# Patient Record
Sex: MALE | Race: WHITE | NOT HISPANIC OR LATINO | Employment: OTHER | URBAN - METROPOLITAN AREA
[De-identification: names, ages, dates, MRNs, and addresses within clinical notes are randomized per-mention and may not be internally consistent; named-entity substitution may affect disease eponyms.]

---

## 2017-01-03 ENCOUNTER — CONSULT (OUTPATIENT)
Dept: CARDIOLOGY | Facility: CLINIC | Age: 57
End: 2017-01-03

## 2017-01-03 VITALS
HEART RATE: 105 BPM | DIASTOLIC BLOOD PRESSURE: 86 MMHG | HEIGHT: 69 IN | WEIGHT: 247 LBS | BODY MASS INDEX: 36.58 KG/M2 | SYSTOLIC BLOOD PRESSURE: 122 MMHG

## 2017-01-03 DIAGNOSIS — E11.65 TYPE 2 DIABETES MELLITUS WITH HYPERGLYCEMIA, WITHOUT LONG-TERM CURRENT USE OF INSULIN (HCC): ICD-10-CM

## 2017-01-03 DIAGNOSIS — I10 ESSENTIAL HYPERTENSION: ICD-10-CM

## 2017-01-03 DIAGNOSIS — R06.02 SOB (SHORTNESS OF BREATH): ICD-10-CM

## 2017-01-03 DIAGNOSIS — I50.21 ACUTE SYSTOLIC CONGESTIVE HEART FAILURE (HCC): ICD-10-CM

## 2017-01-03 DIAGNOSIS — E78.2 MIXED HYPERLIPIDEMIA: ICD-10-CM

## 2017-01-03 DIAGNOSIS — R07.9 CHEST PAIN, UNSPECIFIED TYPE: Primary | ICD-10-CM

## 2017-01-03 PROCEDURE — 99244 OFF/OP CNSLTJ NEW/EST MOD 40: CPT | Performed by: INTERNAL MEDICINE

## 2017-01-03 RX ORDER — PANTOPRAZOLE SODIUM 40 MG/1
40 TABLET, DELAYED RELEASE ORAL DAILY
COMMUNITY

## 2017-01-03 RX ORDER — TORSEMIDE 20 MG/1
10 TABLET ORAL DAILY
Qty: 90 TABLET | Refills: 3 | Status: SHIPPED | OUTPATIENT
Start: 2017-01-03 | End: 2017-01-23 | Stop reason: SDUPTHER

## 2017-01-03 RX ORDER — TAMSULOSIN HYDROCHLORIDE 0.4 MG/1
1 CAPSULE ORAL NIGHTLY
COMMUNITY

## 2017-01-03 RX ORDER — POTASSIUM CHLORIDE 750 MG/1
10 TABLET, FILM COATED, EXTENDED RELEASE ORAL DAILY
Qty: 30 TABLET | Refills: 11 | Status: SHIPPED | OUTPATIENT
Start: 2017-01-03 | End: 2017-01-23 | Stop reason: SDUPTHER

## 2017-01-03 RX ORDER — ROSUVASTATIN CALCIUM 10 MG/1
10 TABLET, COATED ORAL DAILY
COMMUNITY
End: 2022-02-10 | Stop reason: SDUPTHER

## 2017-01-03 RX ORDER — CARVEDILOL 3.12 MG/1
3.12 TABLET ORAL 2 TIMES DAILY
Qty: 60 TABLET | Refills: 11 | Status: SHIPPED | OUTPATIENT
Start: 2017-01-03 | End: 2017-01-23 | Stop reason: SDUPTHER

## 2017-01-03 RX ORDER — LISINOPRIL 5 MG/1
5 TABLET ORAL DAILY
Qty: 30 TABLET | Refills: 11 | Status: SHIPPED | OUTPATIENT
Start: 2017-01-03 | End: 2017-01-23 | Stop reason: SDUPTHER

## 2017-01-03 NOTE — LETTER
January 3, 2017     Dallas Crespo MD  [address] [facility] [address]  [facility] [address]  [place] [address]    Patient: Abdirahman Mclean   YOB: 1960   Date of Visit: 1/3/2017       Dear Dr. Cherie MD:    Thank you for referring Abdirahman Mclean to me for evaluation. Below are the relevant portions of my assessment and plan of care.    If you have questions, please do not hesitate to call me. I look forward to following Abdirahman along with you.         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    CC:  Chief Complaint   Patient presents with   • abnormal echo     Consult   • Chest Pain     down his Rt arm   • Shortness of Breath   • Irregular Heart Beat       PROBLEM LIST:    CHF acute EF per [facility] reportedly <30%    12/16 bnp 480    2008 [facility] wnl EF 60  DM -A1c 8.3 5 yrs  HTN  HL-on statin    2016 total chol 104  TAMAR- cpap intolerant  Palpitations    12/16 holter <1%pvc, <1% pac   Chronically elevated cpk 4-500    Allergies  Allergies   Allergen Reactions   • Other GI Intolerance     All pain medications        Current Medications    Current Outpatient Prescriptions:   •  ALLOPURINOL PO, Take 100 mg by mouth Daily., Disp: , Rfl:   •  pantoprazole (PROTONIX) 40 MG EC tablet, Take 40 mg by mouth Daily., Disp: , Rfl:   •  RaNITidine HCl (ZANTAC PO), Take 300 mg by mouth Daily., Disp: , Rfl:   •  Rosuvastatin Calcium (CRESTOR PO), Take 10 mg by mouth Daily., Disp: , Rfl:   •  SITagliptin-MetFORMIN HCl (JANUMET PO), Take 1,000 mg by mouth 2 (Two) Times a Day., Disp: , Rfl:   •  tamsulosin (FLOMAX) 0.4 MG capsule 24 hr capsule, Take 1 capsule by mouth Every Night., Disp: , Rfl:     "    History of Present Illness   HPI    Pt admitted to [facility] 2 weeks prior with MIRAMONTES.  [place] to have viral pneumonitis.  Was treated and released on abx.  Unfortunately he worsened as outpt and represented to Dr Crespo's office today.  He was felt to have volume overload and was concerned there could be an ischemic culprit for Mr Mclean' LV dysfxn.  Dr Crespo asked that the pt be seen today.  Mr Mlcean notes being unable to lie down without profound dyspnea.  Limited activity results in \"panting\" .   He does note right sided sharp chest pains with breathing primarily.    ROS  Review of Systems   Constitution: Negative for chills, fever, weakness, malaise/fatigue, night sweats, weight gain and weight loss.   HENT: Negative for headaches, hearing loss and nosebleeds.    Eyes: Negative for blurred vision, vision loss in left eye, vision loss in right eye, visual disturbance and visual halos.   Cardiovascular: Positive for chest pain, dyspnea on exertion and palpitations. Negative for claudication, cyanosis, irregular heartbeat, leg swelling, near-syncope, orthopnea, paroxysmal nocturnal dyspnea and syncope.   Respiratory: Negative for cough, hemoptysis, shortness of breath, snoring and wheezing.    Endocrine: Negative for cold intolerance, heat intolerance, polydipsia, polyphagia and polyuria.   Hematologic/Lymphatic: Negative for adenopathy and bleeding problem. Does not bruise/bleed easily.   Skin: Negative for dry skin, poor wound healing and rash.   Musculoskeletal: Negative for falls, joint pain, joint swelling, muscle cramps, muscle weakness, myalgias and neck pain.   Gastrointestinal: Negative for bloating, abdominal pain, change in bowel habit, bowel incontinence, constipation, diarrhea, dysphagia, excessive appetite, heartburn, hematemesis, hematochezia, jaundice, melena, nausea and vomiting.   Genitourinary: Negative for bladder incontinence, dysuria, flank pain, hematuria, hesitancy and nocturia. " "  Neurological: Negative for aphonia, excessive daytime sleepiness, dizziness, focal weakness, light-headedness, loss of balance, seizures, sensory change, tremors and vertigo.   Psychiatric/Behavioral: Negative for altered mental status, depression, memory loss, substance abuse and suicidal ideas. The patient is not nervous/anxious.        SOCIAL HX  Social History     Social History   • Marital status:      Spouse name: N/A   • Number of children: N/A   • Years of education: N/A     Occupational History   • Not on file.     Social History Main Topics   • Smoking status: Never Smoker   • Smokeless tobacco: Never Used   • Alcohol use No   • Drug use: No   • Sexual activity: Defer     Other Topics Concern   • Not on file     Social History Narrative   • No narrative on file       FAMILY HX  Family History   Problem Relation Age of Onset   • Hypertension Mother    • No Known Problems Father    • Hypertension Brother    • Hypertension Brother        Vitals:    01/03/17 1626   BP: 122/86   BP Location: Right arm   Patient Position: Sitting   Pulse: 105   Weight: 247 lb (112 kg)   Height: 69\" (175.3 cm)       PHYSICAL EXAMINATION:  Physical Exam   Constitutional: He appears well-developed and well-nourished.   Neck: Normal range of motion. Neck supple. No hepatojugular reflux and no JVD present. Carotid bruit is not present. No tracheal deviation present. No thyromegaly present.   Cardiovascular: Normal rate, regular rhythm, S1 normal, S2 normal, intact distal pulses and normal pulses.  PMI is not displaced.  Exam reveals no gallop, no distant heart sounds, no friction rub, no midsystolic click and no opening snap.    No murmur heard.  Pulses:       Radial pulses are 2+ on the right side, and 2+ on the left side.        Dorsalis pedis pulses are 2+ on the right side, and 2+ on the left side.        Posterior tibial pulses are 2+ on the right side, and 2+ on the left side.   Pulmonary/Chest: Effort normal and " breath sounds normal. He has no wheezes. He has no rales.   Abdominal: Soft. Bowel sounds are normal. He exhibits no mass. There is no tenderness. There is no guarding.       Diagnostic Data:  Procedures  No results found for: CHLPL, TRIG, HDL, LDLDIRECT  No results found for: GLUCOSE, BUN, CREATININE, NA, K, CL, CO2, CREATININE, BUN  No results found for: HGBA1C  No results found for: WBC, HGB, HCT, PLT  Labs from [place] reviewed.    ASSESSMENT:   Diagnosis Plan   1. Chest pain, unspecified type  Stress Test With Myocardial Perfusion One Day   2. SOB (shortness of breath)  Stress Test With Myocardial Perfusion One Day   3. Acute systolic congestive heart failure     4. Essential hypertension     5. Mixed hyperlipidemia     6. Type 2 diabetes mellitus with hyperglycemia, without long-term current use of insulin           PLAN:  CHF acute systolic  Will start torsemide/potassium/coreg.  Restart lisinopril.  Screen w lexiscan to evaluate for ischemic burden.  FU BNP after 1 week diuretic.    Pt to refrain from work until improved.    Danny Licona MD, FACC

## 2017-01-03 NOTE — PROGRESS NOTES
"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    CC:  Chief Complaint   Patient presents with   • abnormal echo     Consult   • Chest Pain     down his Rt arm   • Shortness of Breath   • Irregular Heart Beat       PROBLEM LIST:    CHF acute EF per [facility] reportedly <30%    12/16 bnp 480    2008 [facility] wnl EF 60  DM -A1c 8.3 5 yrs  HTN  HL-on statin    2016 total chol 104  TAMAR- cpap intolerant  Palpitations    12/16 holter <1%pvc, <1% pac   Chronically elevated cpk 4-500    Allergies  Allergies   Allergen Reactions   • Other GI Intolerance     All pain medications        Current Medications    Current Outpatient Prescriptions:   •  ALLOPURINOL PO, Take 100 mg by mouth Daily., Disp: , Rfl:   •  pantoprazole (PROTONIX) 40 MG EC tablet, Take 40 mg by mouth Daily., Disp: , Rfl:   •  RaNITidine HCl (ZANTAC PO), Take 300 mg by mouth Daily., Disp: , Rfl:   •  Rosuvastatin Calcium (CRESTOR PO), Take 10 mg by mouth Daily., Disp: , Rfl:   •  SITagliptin-MetFORMIN HCl (JANUMET PO), Take 1,000 mg by mouth 2 (Two) Times a Day., Disp: , Rfl:   •  tamsulosin (FLOMAX) 0.4 MG capsule 24 hr capsule, Take 1 capsule by mouth Every Night., Disp: , Rfl:      History of Present Illness   HPI    Pt admitted to [facility] 2 weeks prior with MIRAMONTES.  [place] to have viral pneumonitis.  Was treated and released on abx.  Unfortunately he worsened as outpt and represented to Dr Crespo's office today.  He was felt to have volume overload and was concerned there could be an ischemic culprit for Mr Mclean' LV dysfxn.  Dr Crespo asked that the pt be seen today.  Mr Mclean notes being unable to lie down without profound dyspnea.  Limited activity results in \"panting\" .   He does note right sided sharp chest pains with " breathing primarily.    ROS  Review of Systems   Constitution: Negative for chills, fever, weakness, malaise/fatigue, night sweats, weight gain and weight loss.   HENT: Negative for headaches, hearing loss and nosebleeds.    Eyes: Negative for blurred vision, vision loss in left eye, vision loss in right eye, visual disturbance and visual halos.   Cardiovascular: Positive for chest pain, dyspnea on exertion and palpitations. Negative for claudication, cyanosis, irregular heartbeat, leg swelling, near-syncope, orthopnea, paroxysmal nocturnal dyspnea and syncope.   Respiratory: Negative for cough, hemoptysis, shortness of breath, snoring and wheezing.    Endocrine: Negative for cold intolerance, heat intolerance, polydipsia, polyphagia and polyuria.   Hematologic/Lymphatic: Negative for adenopathy and bleeding problem. Does not bruise/bleed easily.   Skin: Negative for dry skin, poor wound healing and rash.   Musculoskeletal: Negative for falls, joint pain, joint swelling, muscle cramps, muscle weakness, myalgias and neck pain.   Gastrointestinal: Negative for bloating, abdominal pain, change in bowel habit, bowel incontinence, constipation, diarrhea, dysphagia, excessive appetite, heartburn, hematemesis, hematochezia, jaundice, melena, nausea and vomiting.   Genitourinary: Negative for bladder incontinence, dysuria, flank pain, hematuria, hesitancy and nocturia.   Neurological: Negative for aphonia, excessive daytime sleepiness, dizziness, focal weakness, light-headedness, loss of balance, seizures, sensory change, tremors and vertigo.   Psychiatric/Behavioral: Negative for altered mental status, depression, memory loss, substance abuse and suicidal ideas. The patient is not nervous/anxious.        SOCIAL HX  Social History     Social History   • Marital status:      Spouse name: N/A   • Number of children: N/A   • Years of education: N/A     Occupational History   • Not on file.     Social History Main Topics  "  • Smoking status: Never Smoker   • Smokeless tobacco: Never Used   • Alcohol use No   • Drug use: No   • Sexual activity: Defer     Other Topics Concern   • Not on file     Social History Narrative   • No narrative on file       FAMILY HX  Family History   Problem Relation Age of Onset   • Hypertension Mother    • No Known Problems Father    • Hypertension Brother    • Hypertension Brother        Vitals:    01/03/17 1626   BP: 122/86   BP Location: Right arm   Patient Position: Sitting   Pulse: 105   Weight: 247 lb (112 kg)   Height: 69\" (175.3 cm)       PHYSICAL EXAMINATION:  Physical Exam   Constitutional: He appears well-developed and well-nourished.   Neck: Normal range of motion. Neck supple. No hepatojugular reflux and no JVD present. Carotid bruit is not present. No tracheal deviation present. No thyromegaly present.   Cardiovascular: Normal rate, regular rhythm, S1 normal, S2 normal, intact distal pulses and normal pulses.  PMI is not displaced.  Exam reveals no gallop, no distant heart sounds, no friction rub, no midsystolic click and no opening snap.    No murmur heard.  Pulses:       Radial pulses are 2+ on the right side, and 2+ on the left side.        Dorsalis pedis pulses are 2+ on the right side, and 2+ on the left side.        Posterior tibial pulses are 2+ on the right side, and 2+ on the left side.   Pulmonary/Chest: Effort normal and breath sounds normal. He has no wheezes. He has no rales.   Abdominal: Soft. Bowel sounds are normal. He exhibits no mass. There is no tenderness. There is no guarding.       Diagnostic Data:  Procedures  No results found for: CHLPL, TRIG, HDL, LDLDIRECT  No results found for: GLUCOSE, BUN, CREATININE, NA, K, CL, CO2, CREATININE, BUN  No results found for: HGBA1C  No results found for: WBC, HGB, HCT, PLT  Labs from [place] reviewed.    ASSESSMENT:   Diagnosis Plan   1. Chest pain, unspecified type  Stress Test With Myocardial Perfusion One Day   2. SOB (shortness " of breath)  Stress Test With Myocardial Perfusion One Day   3. Acute systolic congestive heart failure     4. Essential hypertension     5. Mixed hyperlipidemia     6. Type 2 diabetes mellitus with hyperglycemia, without long-term current use of insulin           PLAN:  CHF acute systolic  Will start torsemide/potassium/coreg.  Restart lisinopril.  Screen w lexiscan to evaluate for ischemic burden.  FU BNP after 1 week diuretic.    Pt to refrain from work until improved.    Danny Licona MD, FACC

## 2017-01-13 ENCOUNTER — APPOINTMENT (OUTPATIENT)
Dept: CARDIOLOGY | Facility: HOSPITAL | Age: 57
End: 2017-01-13
Attending: INTERNAL MEDICINE

## 2017-01-23 RX ORDER — CARVEDILOL 3.12 MG/1
3.12 TABLET ORAL 2 TIMES DAILY
Qty: 60 TABLET | Refills: 11 | Status: SHIPPED | OUTPATIENT
Start: 2017-01-23 | End: 2017-02-28

## 2017-01-23 RX ORDER — POTASSIUM CHLORIDE 750 MG/1
10 TABLET, FILM COATED, EXTENDED RELEASE ORAL DAILY
Qty: 30 TABLET | Refills: 11 | Status: SHIPPED | OUTPATIENT
Start: 2017-01-23 | End: 2017-02-13 | Stop reason: HOSPADM

## 2017-01-23 RX ORDER — LISINOPRIL 5 MG/1
5 TABLET ORAL DAILY
Qty: 30 TABLET | Refills: 11 | Status: SHIPPED | OUTPATIENT
Start: 2017-01-23 | End: 2017-05-20 | Stop reason: SDUPTHER

## 2017-01-23 RX ORDER — TORSEMIDE 20 MG/1
10 TABLET ORAL DAILY
Qty: 90 TABLET | Refills: 3 | Status: SHIPPED | OUTPATIENT
Start: 2017-01-23 | End: 2018-02-05 | Stop reason: SDUPTHER

## 2017-01-31 ENCOUNTER — HOSPITAL ENCOUNTER (OUTPATIENT)
Dept: CARDIOLOGY | Facility: HOSPITAL | Age: 57
End: 2017-01-31
Attending: INTERNAL MEDICINE

## 2017-01-31 ENCOUNTER — APPOINTMENT (OUTPATIENT)
Dept: CARDIOLOGY | Facility: HOSPITAL | Age: 57
End: 2017-01-31
Attending: INTERNAL MEDICINE

## 2017-01-31 ENCOUNTER — HOSPITAL ENCOUNTER (OUTPATIENT)
Dept: CARDIOLOGY | Facility: HOSPITAL | Age: 57
Discharge: HOME | End: 2017-01-31
Attending: INTERNAL MEDICINE

## 2017-01-31 ENCOUNTER — TELEPHONE (OUTPATIENT)
Dept: CARDIOLOGY | Facility: CLINIC | Age: 57
End: 2017-01-31

## 2017-01-31 NOTE — TELEPHONE ENCOUNTER
Called patients wife back about her medication question and left VM to call back at her convenience.

## 2017-02-01 ENCOUNTER — TELEPHONE (OUTPATIENT)
Dept: CARDIOLOGY | Facility: CLINIC | Age: 57
End: 2017-02-01

## 2017-02-06 ENCOUNTER — APPOINTMENT (OUTPATIENT)
Dept: CARDIOLOGY | Facility: HOSPITAL | Age: 57
End: 2017-02-06
Attending: INTERNAL MEDICINE

## 2017-02-08 ENCOUNTER — TELEPHONE (OUTPATIENT)
Dept: CARDIOLOGY | Facility: CLINIC | Age: 57
End: 2017-02-08

## 2017-02-08 DIAGNOSIS — R94.30 EJECTION FRACTION < 50%: ICD-10-CM

## 2017-02-08 DIAGNOSIS — R94.39 ABNORMAL STRESS TEST: Primary | ICD-10-CM

## 2017-02-09 ENCOUNTER — TELEPHONE (OUTPATIENT)
Dept: CARDIOLOGY | Facility: CLINIC | Age: 57
End: 2017-02-09

## 2017-02-09 NOTE — TELEPHONE ENCOUNTER
Patient called asking when heart cath would be scheduled informed him he should hear from scheduling. Transferred patient to [facility] in scheduling to leave a message.

## 2017-02-10 ENCOUNTER — PREP FOR SURGERY (OUTPATIENT)
Dept: CARDIOLOGY | Facility: CLINIC | Age: 57
End: 2017-02-10

## 2017-02-10 RX ORDER — NITROGLYCERIN 0.4 MG/1
0.4 TABLET SUBLINGUAL
Status: CANCELLED | OUTPATIENT
Start: 2017-02-10

## 2017-02-10 RX ORDER — ACETAMINOPHEN 325 MG/1
650 TABLET ORAL EVERY 4 HOURS PRN
Status: CANCELLED | OUTPATIENT
Start: 2017-02-10

## 2017-02-10 RX ORDER — SODIUM CHLORIDE 0.9 % (FLUSH) 0.9 %
1-10 SYRINGE (ML) INJECTION AS NEEDED
Status: CANCELLED | OUTPATIENT
Start: 2017-02-10

## 2017-02-10 RX ORDER — ASPIRIN 81 MG/1
325 TABLET ORAL DAILY
Status: CANCELLED | OUTPATIENT
Start: 2017-02-11

## 2017-02-10 RX ORDER — ONDANSETRON 2 MG/ML
4 INJECTION INTRAMUSCULAR; INTRAVENOUS EVERY 6 HOURS PRN
Status: CANCELLED | OUTPATIENT
Start: 2017-02-10

## 2017-02-10 RX ORDER — ASPIRIN 325 MG
325 TABLET ORAL ONCE
Status: CANCELLED | OUTPATIENT
Start: 2017-02-10 | End: 2017-02-10

## 2017-02-13 ENCOUNTER — HOSPITAL ENCOUNTER (OUTPATIENT)
Facility: HOSPITAL | Age: 57
Discharge: HOME | End: 2017-02-13
Attending: INTERNAL MEDICINE | Admitting: INTERNAL MEDICINE

## 2017-02-13 VITALS
HEIGHT: 69 IN | WEIGHT: 223.77 LBS | RESPIRATION RATE: 16 BRPM | TEMPERATURE: 99.1 F | SYSTOLIC BLOOD PRESSURE: 96 MMHG | DIASTOLIC BLOOD PRESSURE: 68 MMHG | OXYGEN SATURATION: 93 % | HEART RATE: 75 BPM | BODY MASS INDEX: 33.14 KG/M2

## 2017-02-13 DIAGNOSIS — I24.9 CORONARY SYNDROME, ACUTE (HCC): Primary | ICD-10-CM

## 2017-02-13 DIAGNOSIS — R07.89 NON-CARDIAC CHEST PAIN: ICD-10-CM

## 2017-02-13 PROBLEM — I42.9 CARDIOMYOPATHY (HCC): Status: ACTIVE | Noted: 2017-02-13

## 2017-02-13 PROBLEM — I42.8 NON-ISCHEMIC CARDIOMYOPATHY: Status: ACTIVE | Noted: 2017-02-13

## 2017-02-13 PROBLEM — E11.9 TYPE 2 DIABETES MELLITUS WITHOUT COMPLICATION (HCC): Status: ACTIVE | Noted: 2017-02-13

## 2017-02-13 LAB
ALBUMIN SERPL-MCNC: 4.2 G/DL (ref 3.2–4.8)
ALBUMIN/GLOB SERPL: 1.3 G/DL (ref 1.5–2.5)
ALP SERPL-CCNC: 92 U/L (ref 25–100)
ALT SERPL W P-5'-P-CCNC: 31 U/L (ref 7–40)
ANION GAP SERPL CALCULATED.3IONS-SCNC: 7 MMOL/L (ref 3–11)
ARTICHOKE IGE QN: 132 MG/DL (ref 0–130)
AST SERPL-CCNC: 22 U/L (ref 0–33)
BILIRUB SERPL-MCNC: 0.7 MG/DL (ref 0.3–1.2)
BUN BLD-MCNC: 13 MG/DL (ref 9–23)
BUN/CREAT SERPL: 16.3 (ref 7–25)
CALCIUM SPEC-SCNC: 9.7 MG/DL (ref 8.7–10.4)
CHLORIDE SERPL-SCNC: 102 MMOL/L (ref 99–109)
CHOLEST SERPL-MCNC: 238 MG/DL (ref 0–200)
CO2 SERPL-SCNC: 30 MMOL/L (ref 20–31)
CREAT BLD-MCNC: 0.8 MG/DL (ref 0.6–1.3)
DEPRECATED RDW RBC AUTO: 48 FL (ref 37–54)
ERYTHROCYTE [DISTWIDTH] IN BLOOD BY AUTOMATED COUNT: 14.8 % (ref 11.3–14.5)
GFR SERPL CREATININE-BSD FRML MDRD: 99 ML/MIN/1.73
GLOBULIN UR ELPH-MCNC: 3.3 GM/DL
GLUCOSE BLD-MCNC: 114 MG/DL (ref 70–100)
GLUCOSE BLDC GLUCOMTR-MCNC: 126 MG/DL (ref 70–130)
GLUCOSE BLDC GLUCOMTR-MCNC: 99 MG/DL (ref 70–130)
HBA1C MFR BLD: 7.2 % (ref 4.8–5.6)
HCT VFR BLD AUTO: 46 % (ref 38.9–50.9)
HDLC SERPL-MCNC: 41 MG/DL (ref 40–60)
HGB BLD-MCNC: 15 G/DL (ref 13.1–17.5)
MCH RBC QN AUTO: 29 PG (ref 27–31)
MCHC RBC AUTO-ENTMCNC: 32.6 G/DL (ref 32–36)
MCV RBC AUTO: 89 FL (ref 80–99)
PLATELET # BLD AUTO: 242 10*3/MM3 (ref 150–450)
PMV BLD AUTO: 10.7 FL (ref 6–12)
POTASSIUM BLD-SCNC: 4 MMOL/L (ref 3.5–5.5)
PROT SERPL-MCNC: 7.5 G/DL (ref 5.7–8.2)
RBC # BLD AUTO: 5.17 10*6/MM3 (ref 4.2–5.76)
SODIUM BLD-SCNC: 139 MMOL/L (ref 132–146)
TRIGL SERPL-MCNC: 250 MG/DL (ref 0–150)
TROPONIN I SERPL-MCNC: <0.006 NG/ML
WBC NRBC COR # BLD: 7.57 10*3/MM3 (ref 3.5–10.8)

## 2017-02-13 PROCEDURE — 80061 LIPID PANEL: CPT | Performed by: PHYSICIAN ASSISTANT

## 2017-02-13 PROCEDURE — 25010000002 MIDAZOLAM PER 1 MG: Performed by: INTERNAL MEDICINE

## 2017-02-13 PROCEDURE — 25010000002 HEPARIN (PORCINE) PER 1000 UNITS: Performed by: INTERNAL MEDICINE

## 2017-02-13 PROCEDURE — 93458 L HRT ARTERY/VENTRICLE ANGIO: CPT | Performed by: INTERNAL MEDICINE

## 2017-02-13 PROCEDURE — 25010000002 FENTANYL CITRATE (PF) 100 MCG/2ML SOLUTION: Performed by: INTERNAL MEDICINE

## 2017-02-13 PROCEDURE — 0 IOPAMIDOL PER 1 ML: Performed by: INTERNAL MEDICINE

## 2017-02-13 PROCEDURE — C1894 INTRO/SHEATH, NON-LASER: HCPCS | Performed by: INTERNAL MEDICINE

## 2017-02-13 PROCEDURE — 85027 COMPLETE CBC AUTOMATED: CPT | Performed by: PHYSICIAN ASSISTANT

## 2017-02-13 PROCEDURE — 93005 ELECTROCARDIOGRAM TRACING: CPT | Performed by: NURSE PRACTITIONER

## 2017-02-13 PROCEDURE — 80053 COMPREHEN METABOLIC PANEL: CPT | Performed by: PHYSICIAN ASSISTANT

## 2017-02-13 PROCEDURE — 82962 GLUCOSE BLOOD TEST: CPT

## 2017-02-13 PROCEDURE — 83036 HEMOGLOBIN GLYCOSYLATED A1C: CPT | Performed by: PHYSICIAN ASSISTANT

## 2017-02-13 PROCEDURE — 84484 ASSAY OF TROPONIN QUANT: CPT | Performed by: NURSE PRACTITIONER

## 2017-02-13 PROCEDURE — S0260 H&P FOR SURGERY: HCPCS | Performed by: NURSE PRACTITIONER

## 2017-02-13 PROCEDURE — C1769 GUIDE WIRE: HCPCS | Performed by: INTERNAL MEDICINE

## 2017-02-13 RX ORDER — HEPARIN SODIUM 1000 [USP'U]/ML
INJECTION, SOLUTION INTRAVENOUS; SUBCUTANEOUS AS NEEDED
Status: DISCONTINUED | OUTPATIENT
Start: 2017-02-13 | End: 2017-02-13 | Stop reason: HOSPADM

## 2017-02-13 RX ORDER — SODIUM CHLORIDE 0.9 % (FLUSH) 0.9 %
1-10 SYRINGE (ML) INJECTION AS NEEDED
Status: DISCONTINUED | OUTPATIENT
Start: 2017-02-13 | End: 2017-02-13 | Stop reason: HOSPADM

## 2017-02-13 RX ORDER — FENTANYL CITRATE 50 UG/ML
INJECTION, SOLUTION INTRAMUSCULAR; INTRAVENOUS AS NEEDED
Status: DISCONTINUED | OUTPATIENT
Start: 2017-02-13 | End: 2017-02-13 | Stop reason: HOSPADM

## 2017-02-13 RX ORDER — MIDAZOLAM HYDROCHLORIDE 1 MG/ML
INJECTION INTRAMUSCULAR; INTRAVENOUS AS NEEDED
Status: DISCONTINUED | OUTPATIENT
Start: 2017-02-13 | End: 2017-02-13 | Stop reason: HOSPADM

## 2017-02-13 RX ORDER — ACETAMINOPHEN 325 MG/1
650 TABLET ORAL EVERY 4 HOURS PRN
Status: DISCONTINUED | OUTPATIENT
Start: 2017-02-13 | End: 2017-02-13 | Stop reason: HOSPADM

## 2017-02-13 RX ORDER — ASPIRIN 325 MG
325 TABLET ORAL ONCE
Status: COMPLETED | OUTPATIENT
Start: 2017-02-13 | End: 2017-02-13

## 2017-02-13 RX ORDER — SPIRONOLACTONE 25 MG/1
25 TABLET ORAL DAILY
Qty: 90 TABLET | Refills: 3 | Status: SHIPPED | OUTPATIENT
Start: 2017-02-13 | End: 2020-07-16

## 2017-02-13 RX ORDER — ASPIRIN 325 MG
325 TABLET, DELAYED RELEASE (ENTERIC COATED) ORAL DAILY
Status: DISCONTINUED | OUTPATIENT
Start: 2017-02-14 | End: 2017-02-13 | Stop reason: HOSPADM

## 2017-02-13 RX ORDER — ONDANSETRON 2 MG/ML
4 INJECTION INTRAMUSCULAR; INTRAVENOUS EVERY 6 HOURS PRN
Status: DISCONTINUED | OUTPATIENT
Start: 2017-02-13 | End: 2017-02-13 | Stop reason: HOSPADM

## 2017-02-13 RX ORDER — METOCLOPRAMIDE HYDROCHLORIDE 5 MG/ML
10 INJECTION INTRAMUSCULAR; INTRAVENOUS EVERY 6 HOURS PRN
Status: DISCONTINUED | OUTPATIENT
Start: 2017-02-13 | End: 2017-02-13 | Stop reason: HOSPADM

## 2017-02-13 RX ORDER — LIDOCAINE HYDROCHLORIDE 10 MG/ML
INJECTION, SOLUTION INFILTRATION; PERINEURAL AS NEEDED
Status: DISCONTINUED | OUTPATIENT
Start: 2017-02-13 | End: 2017-02-13 | Stop reason: HOSPADM

## 2017-02-13 RX ORDER — NITROGLYCERIN 0.4 MG/1
0.4 TABLET SUBLINGUAL
Status: DISCONTINUED | OUTPATIENT
Start: 2017-02-13 | End: 2017-02-13 | Stop reason: HOSPADM

## 2017-02-13 RX ADMIN — NITROGLYCERIN 1.5 INCH: 20 OINTMENT TOPICAL at 10:16

## 2017-02-13 RX ADMIN — ASPIRIN 325 MG ORAL TABLET 325 MG: 325 PILL ORAL at 10:16

## 2017-02-13 NOTE — PLAN OF CARE
Problem: Patient Care Overview (Adult)  Goal: Plan of Care Review  Outcome: Ongoing (interventions implemented as appropriate)    02/13/17 1600   Coping/Psychosocial Response Interventions   Plan Of Care Reviewed With patient;spouse   Patient Care Overview   Progress improving   Outcome Evaluation   Outcome Summary/Follow up Plan PT AND FAMILY VERBALIZE UNDERSTANDING OF DC INSTRUCTIONS. NO QUESTIONS AT THIS TIME. SITE STABLE, AMBULATING WITHOUT DIFFICULTY.        Goal: Discharge Needs Assessment  Outcome: Ongoing (interventions implemented as appropriate)    02/13/17 1600   Discharge Needs Assessment   Concerns To Be Addressed no discharge needs identified         Problem: Cardiac Catheterization with/without PCI (Adult)  Goal: Signs and Symptoms of Listed Potential Problems Will be Absent or Manageable (Cardiac Catheterization with/without PCI)  Outcome: Ongoing (interventions implemented as appropriate)    02/13/17 1600   Cardiac Catheterization with/without PCI   Problems Assessed (Cardiac Catheterization) all   Problems Present (Cardiac Catheterization) none

## 2017-02-13 NOTE — H&P
Pre-cardiac Catheterization Report  Cardiovascular Laboratory  [facility]      Patient:  Abdirahman Mclean  :  1960  PCP:  Dallas Crespo MD  PHONE:  [phone]    DATE: 2017    BRIEF HPI:  Abdirahman Mclean is a 56 y.o. male with no known coronary artery disease that was received as a transfer from [facility] early this morning with complaints of chest pain concerning for unstable angina.  According to Mr. Mclean he has been experiencing worsening shortness of breath for the last several months.  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 He is unable to lie flat because he becomes too short of breath and has to use several pillows to sleep at night.  In December he was admitted to [facility] for 2 weeks with dyspnea on exertion that was felt to be viral pneumonia and was treated with antibiotics.  When his symptoms did not improve his primary care provider Dr. Dewey ordered an echocardiogram that showed new onset cardiomyopathy with a reduced LVEF of 15-20%.  Mr. Mclean was seen as a consult by Dr. Freddy Licona last month for his newly diagnosed cardiomyopathy and a life vest was arranged which he is still wearing today.  He underwent a stress test that showed a small defect in the inferior lateral wall consistent with ischemia and was actually scheduled for an outpatient cardiac catheterization this Wednesday with Dr Garcia.  Early this morning the patient was awoken with chest tightness that would not go away and he became diaphoretic and clammy  which prompted his visit to the emergency department at [facility] where he was ruled out as a NSTEMI/STEMI with negative troponin and EKG with non specific changes. His symptoms eventually subsided without the use of nitroglycerin.   He currently complains of chest tightness rated at 3 out of 10. He has refused aspirin because  he says that it exacerbates his symptoms of GERD.  He did undergo a cardiac catheterization back in 2008 which was reportedly normal and he was diagnosed with severe acid reflux at that time which he currently treats with protonix.  He denies use of alcohol, tobacco or illicit drugs.      Cardiac Risk Factors: Male age, advanced gender    Anginal class in last 2 weeks:  CCS class III    CHF Class in last 2 weeks:  NYHA Class III    Cardiogenic shock:  no    Cardiac arrest <24 hours:  no    Stress test within last 6 months:   yes   Details: Myocardial perfusion study 02/03/2017: Inferior lateral wall defect consistent with ischemia.  LVEF 20%    Previous cardiac catheterization:  yes  Details: 2008 cardiac catheterization at [facility]: Supposedly normal.  LVEF 60%    Previous CABG:  no  Details:      Allergies:     IV contrast allergy:  no  Allergies   Allergen Reactions   • Other GI Intolerance     All pain medications cause constipation       MEDICATIONS:  Prior to Admission medications    Medication Sig Start Date End Date Taking? Authorizing Provider   ALLOPURINOL PO Take 100 mg by mouth Daily.   Yes Historical Provider, MD   carvedilol (COREG) 3.125 MG tablet Take 1 tablet by mouth 2 (Two) Times a Day. 1/23/17  Yes Danny Licona MD   lisinopril (PRINIVIL,ZESTRIL) 5 MG tablet Take 1 tablet by mouth Daily. 1/23/17  Yes Danny Licona MD   pantoprazole (PROTONIX) 40 MG EC tablet Take 40 mg by mouth Daily.   Yes Historical Provider, MD   potassium chloride (K-DUR) 10 MEQ CR tablet Take 1 tablet by mouth Daily. 1/23/17  Yes Danny Licona MD   RaNITidine HCl (ZANTAC PO) Take 300 mg by mouth Daily.   Yes Historical Provider, MD   Rosuvastatin Calcium (CRESTOR PO) Take 10 mg by mouth Daily.   Yes Historical Provider, MD   SITagliptin-MetFORMIN HCl (JANUMET PO) Take 1,000 mg by mouth 2 (Two) Times a Day.   Yes Historical Provider, MD   tamsulosin (FLOMAX) 0.4 MG capsule 24 hr capsule Take 1 capsule by mouth  Every Night.   Yes Historical Provider, MD   torsemide (DEMADEX) 20 MG tablet Take 0.5 tablets by mouth Daily. 1/23/17  Yes Danny Licona MD       Past medical & surgical history, social and family history reviewed in the electronic medical record.    ROS:  Pertinent positives listed in HPI all other systems reviewed and were negative    Physical Exam:    Vitals:   Vitals:    02/13/17 0801   BP: 114/78   Pulse:    Resp:    Temp:    SpO2:       Last Weight    02/13/17  0800   Weight: 223 lb 12.3 oz (101 kg)   Physical Exam   Constitutional: He is oriented to person, place, and time. He appears well-developed and well-nourished.   HENT:   Head: Normocephalic and atraumatic.   Eyes: Pupils are equal, round, and reactive to light. No scleral icterus.   Neck: No JVD present. Carotid bruit is not present. No thyromegaly present.   Cardiovascular: Normal rate, regular rhythm, S1 normal and S2 normal.  Exam reveals no gallop.    No murmur heard.  Pulmonary/Chest: Effort normal and breath sounds normal.   Abdominal: Soft. There is no tenderness.   Neurological: He is alert and oriented to person, place, and time.   Skin: Skin is warm and dry. No cyanosis. Nails show no clubbing.   Psychiatric: He has a normal mood and affect. His behavior is normal.     Barbaeu Test:  Left: Normal  (oxymetric Allens) Right: Not Assessed               Results from last 7 days  Lab Units 02/13/17  1007   WBC 10*3/mm3 7.57   HEMOGLOBIN g/dL 15.0   HEMATOCRIT % 46.0   PLATELETS 10*3/mm3 242     No results found for: CHLPL, TRIG, HDL, LDLDIRECT, AST, ALT        IMPRESSION:    Hospital Problem List     * (Principal)Unstable angina pectoris    Overview Signed 2/13/2017 10:15 AM by ROLAND Peacock     · Myocardial perfusion study 02/03/2017: Small reversible defect in inferior lateral wall consistent with ischemia.  LVEF 20%           Cardiomyopathy    Overview Addendum 2/13/2017 10:15 AM by ROLAND Peacock     · [facility]chocardiogram at  [facility] 12//2016: LVEF 15%  · Holter monitor 12/29/2016: Multiple episodes ST depression.  Ventricular ectopy 1% burden.  2 positive exceeding 2.0 seconds with longest at 3.4 seconds.  Heart rate greater than 120 at 6% of the time.  · Myocardial perfusion study 02/03/2017: Small reversible defect in inferior lateral wall consistent with ischemia.  LVEF 20%.  · NYHA class III         Essential hypertension    Hyperlipidemia    Type 2 diabetes mellitus without complication    GERD (gastroesophageal reflux disease)          PLAN:  · Procedure to perform: LHC.  Risk and benefits were discussed and the patient is agreeable to proceed.  · Planned access:  Left radial  · We will need to hold metformin for 48 hours after procedure.    Lauren WATKINS dictating as a scribe for Dr. Ricardo Robertson

## 2017-02-28 ENCOUNTER — OFFICE VISIT (OUTPATIENT)
Dept: CARDIOLOGY | Facility: CLINIC | Age: 57
End: 2017-02-28

## 2017-02-28 VITALS
HEART RATE: 75 BPM | DIASTOLIC BLOOD PRESSURE: 76 MMHG | BODY MASS INDEX: 35.4 KG/M2 | SYSTOLIC BLOOD PRESSURE: 117 MMHG | WEIGHT: 239 LBS | HEIGHT: 69 IN

## 2017-02-28 DIAGNOSIS — I50.9 CHRONIC HEART FAILURE, UNSPECIFIED HEART FAILURE TYPE (HCC): Primary | ICD-10-CM

## 2017-02-28 DIAGNOSIS — E78.2 MIXED HYPERLIPIDEMIA: ICD-10-CM

## 2017-02-28 DIAGNOSIS — R07.89 OTHER CHEST PAIN: ICD-10-CM

## 2017-02-28 DIAGNOSIS — I10 ESSENTIAL HYPERTENSION: ICD-10-CM

## 2017-02-28 DIAGNOSIS — I50.22 CHRONIC SYSTOLIC CONGESTIVE HEART FAILURE (HCC): ICD-10-CM

## 2017-02-28 PROCEDURE — 99213 OFFICE O/P EST LOW 20 MIN: CPT | Performed by: INTERNAL MEDICINE

## 2017-02-28 RX ORDER — CARVEDILOL 3.12 MG/1
6.25 TABLET ORAL 2 TIMES DAILY
Qty: 60 TABLET | Refills: 11 | Status: SHIPPED | OUTPATIENT
Start: 2017-02-28 | End: 2017-06-05 | Stop reason: DRUGHIGH

## 2017-02-28 NOTE — PROGRESS NOTES
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.    Chief Complaint   Patient presents with   • Follow-up     HTN     PROBLEM LIST:     CHF acute EF per [facility] reportedly <30%  12/16 bnp 480  2008 [facility] CBH wnl EF 60  2/17 [facility] nl cors EF 20%     LIFEVEST 2/17  DM -A1c 8.3 5 yrs  HTN  HL-on statin  2016 total chol 104  TAMAR- cpap intolerant  Palpitations  12/16 holter <1%pvc, <1% pac   Chronically elevated cpk 4-500      Allergies  Allergies   Allergen Reactions   • Other GI Intolerance     All pain medications cause constipation       Current Medications    Current Outpatient Prescriptions:   •  ALLOPURINOL PO, Take 100 mg by mouth Daily., Disp: , Rfl:   •  carvedilol (COREG) 3.125 MG tablet, Take 1 tablet by mouth 2 (Two) Times a Day., Disp: 60 tablet, Rfl: 11  •  lisinopril (PRINIVIL,ZESTRIL) 5 MG tablet, Take 1 tablet by mouth Daily., Disp: 30 tablet, Rfl: 11  •  pantoprazole (PROTONIX) 40 MG EC tablet, Take 40 mg by mouth Daily., Disp: , Rfl:   •  RaNITidine HCl (ZANTAC PO), Take 300 mg by mouth Daily., Disp: , Rfl:   •  Rosuvastatin Calcium (CRESTOR PO), Take 10 mg by mouth Daily., Disp: , Rfl:   •  SITagliptin-MetFORMIN HCl (JANUMET PO), Take 1,000 mg by mouth 2 (Two) Times a Day., Disp: , Rfl:   •  spironolactone (ALDACTONE) 25 MG tablet, Take 1 tablet by mouth Daily., Disp: 90 tablet, Rfl: 3  •  tamsulosin (FLOMAX) 0.4 MG capsule 24 hr capsule, Take 1 capsule by mouth Every Night., Disp: , Rfl:   •  torsemide (DEMADEX) 20 MG tablet, Take 0.5 tablets by mouth Daily., Disp: 90 tablet, Rfl: 3      History of Present Illness     Pt denies any new chest pain, dyspnea, dyspnea on exertion, orthopnea, PND, palpitations, lower extremity edema.   Lost 30 lbs w diuresis post  "[facility].  Now applying for disability.    ROS:  All systems have been reviewed and are negative with the exception of those mentioned in the HPI.    OBJECTIVE:  Vitals:    02/28/17 1041   BP: 117/76   BP Location: Left arm   Patient Position: Sitting   Pulse: 75   Weight: 239 lb (108 kg)   Height: 69\" (175.3 cm)     Physical Exam   Constitutional: He appears well-developed and well-nourished.   Neck: Normal range of motion. Neck supple. No hepatojugular reflux and no JVD present. Carotid bruit is not present. No tracheal deviation present. No thyromegaly present.   Cardiovascular: Normal rate, regular rhythm, S1 normal, S2 normal, intact distal pulses and normal pulses.  PMI is not displaced.  Exam reveals no gallop, no distant heart sounds, no friction rub, no midsystolic click and no opening snap.    No murmur heard.  Pulses:       Radial pulses are 2+ on the right side, and 2+ on the left side.        Dorsalis pedis pulses are 2+ on the right side, and 2+ on the left side.        Posterior tibial pulses are 2+ on the right side, and 2+ on the left side.   Pulmonary/Chest: Effort normal and breath sounds normal. He has no wheezes. He has no rales.   Abdominal: Soft. Bowel sounds are normal. He exhibits no mass. There is no tenderness. There is no guarding.   Musculoskeletal: He exhibits edema.       Diagnostic Data:  Procedures      ASSESSMENT:   Diagnosis Plan   1. Chronic heart failure, unspecified heart failure type  Adult Transthoracic Echo Complete   2. Other chest pain  Adult Transthoracic Echo Complete   3. Chronic systolic congestive heart failure     4. Essential hypertension     5. Mixed hyperlipidemia         PLAN:  Nonischemic cardiomyopathy currently with life vest will follow-up echocardiogram May of this year to determine if needed implanted defibrillator.  Will titrate carvedilol to 6.25 twice daily at current and continue medications otherwise    Dallas Crespo MD, thank you for referring Mr. " Vinay for evaluation.  I have forwarded my electronically generated recommendations to you for review.  Please do not hesitate to call with any questions.      Danny Licona MD, FACC

## 2017-03-20 ENCOUNTER — TELEPHONE (OUTPATIENT)
Dept: CARDIOLOGY | Facility: CLINIC | Age: 57
End: 2017-03-20

## 2017-05-18 ENCOUNTER — TELEPHONE (OUTPATIENT)
Dept: CARDIOLOGY | Facility: CLINIC | Age: 57
End: 2017-05-18

## 2017-05-22 RX ORDER — LISINOPRIL 5 MG/1
TABLET ORAL
Qty: 30 TABLET | Refills: 6 | Status: SHIPPED | OUTPATIENT
Start: 2017-05-22 | End: 2017-05-22 | Stop reason: SDUPTHER

## 2017-05-22 RX ORDER — LISINOPRIL 10 MG/1
10 TABLET ORAL DAILY
Qty: 90 TABLET | Refills: 3 | Status: SHIPPED | OUTPATIENT
Start: 2017-05-22 | End: 2017-05-26 | Stop reason: SDUPTHER

## 2017-05-25 ENCOUNTER — CONSULT (OUTPATIENT)
Dept: CARDIOLOGY | Facility: CLINIC | Age: 57
End: 2017-05-25

## 2017-05-25 VITALS
HEIGHT: 69 IN | DIASTOLIC BLOOD PRESSURE: 88 MMHG | HEART RATE: 77 BPM | WEIGHT: 245.6 LBS | SYSTOLIC BLOOD PRESSURE: 112 MMHG | BODY MASS INDEX: 36.38 KG/M2

## 2017-05-25 DIAGNOSIS — I42.8 NON-ISCHEMIC CARDIOMYOPATHY (HCC): ICD-10-CM

## 2017-05-25 DIAGNOSIS — I42.0 CARDIOMYOPATHY, DILATED, NONISCHEMIC (HCC): Primary | ICD-10-CM

## 2017-05-25 DIAGNOSIS — I50.20 SYSTOLIC CONGESTIVE HEART FAILURE, NYHA CLASS 3 (HCC): ICD-10-CM

## 2017-05-25 PROCEDURE — 93000 ELECTROCARDIOGRAM COMPLETE: CPT | Performed by: INTERNAL MEDICINE

## 2017-05-25 PROCEDURE — 99244 OFF/OP CNSLTJ NEW/EST MOD 40: CPT | Performed by: INTERNAL MEDICINE

## 2017-05-26 RX ORDER — LISINOPRIL 10 MG/1
10 TABLET ORAL DAILY
Qty: 90 TABLET | Refills: 3 | Status: SHIPPED | OUTPATIENT
Start: 2017-05-26 | End: 2018-05-18 | Stop reason: SDUPTHER

## 2017-06-05 RX ORDER — CARVEDILOL 12.5 MG/1
12.5 TABLET ORAL 2 TIMES DAILY
Qty: 180 TABLET | Refills: 2 | Status: SHIPPED | OUTPATIENT
Start: 2017-06-05 | End: 2018-05-18 | Stop reason: SDUPTHER

## 2017-06-16 ENCOUNTER — PREP FOR SURGERY (OUTPATIENT)
Dept: OTHER | Facility: HOSPITAL | Age: 57
End: 2017-06-16

## 2017-06-16 DIAGNOSIS — I42.0 CARDIOMYOPATHY, DILATED, NONISCHEMIC (HCC): Primary | ICD-10-CM

## 2017-06-16 RX ORDER — ACETAMINOPHEN 325 MG/1
650 TABLET ORAL EVERY 4 HOURS PRN
Status: CANCELLED | OUTPATIENT
Start: 2017-06-16

## 2017-06-16 RX ORDER — NITROGLYCERIN 0.4 MG/1
0.4 TABLET SUBLINGUAL
Status: CANCELLED | OUTPATIENT
Start: 2017-06-16

## 2017-06-16 RX ORDER — SODIUM CHLORIDE 0.9 % (FLUSH) 0.9 %
1-10 SYRINGE (ML) INJECTION AS NEEDED
Status: CANCELLED | OUTPATIENT
Start: 2017-06-16

## 2017-06-16 RX ORDER — ONDANSETRON 2 MG/ML
4 INJECTION INTRAMUSCULAR; INTRAVENOUS EVERY 6 HOURS PRN
Status: CANCELLED | OUTPATIENT
Start: 2017-06-16

## 2017-06-16 RX ORDER — PROMETHAZINE HYDROCHLORIDE 25 MG/ML
12.5 INJECTION, SOLUTION INTRAMUSCULAR; INTRAVENOUS EVERY 4 HOURS PRN
Status: CANCELLED | OUTPATIENT
Start: 2017-06-16

## 2017-06-26 ENCOUNTER — APPOINTMENT (OUTPATIENT)
Dept: PREADMISSION TESTING | Facility: HOSPITAL | Age: 57
End: 2017-06-26

## 2017-06-26 DIAGNOSIS — I42.0 CARDIOMYOPATHY, DILATED, NONISCHEMIC (HCC): ICD-10-CM

## 2017-06-26 LAB
ANION GAP SERPL CALCULATED.3IONS-SCNC: 9 MMOL/L (ref 3–11)
BUN BLD-MCNC: 16 MG/DL (ref 9–23)
BUN/CREAT SERPL: 17.8 (ref 7–25)
CALCIUM SPEC-SCNC: 9.5 MG/DL (ref 8.7–10.4)
CHLORIDE SERPL-SCNC: 101 MMOL/L (ref 99–109)
CO2 SERPL-SCNC: 30 MMOL/L (ref 20–31)
CREAT BLD-MCNC: 0.9 MG/DL (ref 0.6–1.3)
DEPRECATED RDW RBC AUTO: 47.9 FL (ref 37–54)
ERYTHROCYTE [DISTWIDTH] IN BLOOD BY AUTOMATED COUNT: 14 % (ref 11.3–14.5)
GFR SERPL CREATININE-BSD FRML MDRD: 86 ML/MIN/1.73
GLUCOSE BLD-MCNC: 210 MG/DL (ref 70–100)
HBA1C MFR BLD: 7.8 % (ref 4.8–5.6)
HCT VFR BLD AUTO: 43.3 % (ref 38.9–50.9)
HGB BLD-MCNC: 13.6 G/DL (ref 13.1–17.5)
INR PPP: 0.97
MCH RBC QN AUTO: 29.8 PG (ref 27–31)
MCHC RBC AUTO-ENTMCNC: 31.4 G/DL (ref 32–36)
MCV RBC AUTO: 94.7 FL (ref 80–99)
PLATELET # BLD AUTO: 230 10*3/MM3 (ref 150–450)
PMV BLD AUTO: 9.9 FL (ref 6–12)
POTASSIUM BLD-SCNC: 4.7 MMOL/L (ref 3.5–5.5)
PROTHROMBIN TIME: 10.6 SECONDS (ref 9.6–11.5)
RBC # BLD AUTO: 4.57 10*6/MM3 (ref 4.2–5.76)
SODIUM BLD-SCNC: 140 MMOL/L (ref 132–146)
WBC NRBC COR # BLD: 8.26 10*3/MM3 (ref 3.5–10.8)

## 2017-06-26 PROCEDURE — 83880 ASSAY OF NATRIURETIC PEPTIDE: CPT | Performed by: INTERNAL MEDICINE

## 2017-06-26 PROCEDURE — 85027 COMPLETE CBC AUTOMATED: CPT | Performed by: PHYSICIAN ASSISTANT

## 2017-06-26 PROCEDURE — 83036 HEMOGLOBIN GLYCOSYLATED A1C: CPT | Performed by: PHYSICIAN ASSISTANT

## 2017-06-26 PROCEDURE — 36415 COLL VENOUS BLD VENIPUNCTURE: CPT

## 2017-06-26 PROCEDURE — 85610 PROTHROMBIN TIME: CPT | Performed by: PHYSICIAN ASSISTANT

## 2017-06-26 PROCEDURE — 80048 BASIC METABOLIC PNL TOTAL CA: CPT | Performed by: PHYSICIAN ASSISTANT

## 2017-06-26 NOTE — DISCHARGE INSTRUCTIONS
The following instructions given during Pre Admission Testing visit:    Do not eat or drink anything after MN except for sips of water with your a.m. Prescription meds unless otherwise instructed by your physician.    Glasses and jewelry may be worn, but dentures must be removed prior to cath/procedure.    Leave any items you consider valuable at home.    Family members may wait in [facility] waiting area during procedure.    Bring all medications in their original containers the day of procedure.    Bring photo ID and insurance cards on the day of procedure.    Need to make arrangements for transportation prior to discharge.    The following handouts were given:     Heart Cath pathway (if applicable)   Cardiac Cath booklet published by [facility]    OR appropriate [facility] procedure booklet    If applicable, pt instructed to bring CPAP mask and tubing the day of procedure. WIPES GIVEN.

## 2017-06-27 ENCOUNTER — APPOINTMENT (OUTPATIENT)
Dept: CARDIOLOGY | Facility: HOSPITAL | Age: 57
End: 2017-06-27

## 2017-06-27 ENCOUNTER — HOSPITAL ENCOUNTER (OUTPATIENT)
Facility: HOSPITAL | Age: 57
Setting detail: OBSERVATION
Discharge: HOME | End: 2017-06-28
Attending: INTERNAL MEDICINE | Admitting: INTERNAL MEDICINE

## 2017-06-27 DIAGNOSIS — I42.0 CARDIOMYOPATHY, DILATED, NONISCHEMIC (HCC): ICD-10-CM

## 2017-06-27 DIAGNOSIS — I42.8 NON-ISCHEMIC CARDIOMYOPATHY (HCC): Primary | ICD-10-CM

## 2017-06-27 LAB
BH CV ECHO MEAS - AO ROOT AREA (BSA CORRECTED): 1.3
BH CV ECHO MEAS - AO ROOT AREA: 7 CM^2
BH CV ECHO MEAS - AO ROOT DIAM: 3 CM
BH CV ECHO MEAS - BSA(HAYCOCK): 2.4 M^2
BH CV ECHO MEAS - BSA: 2.3 M^2
BH CV ECHO MEAS - BZI_BMI: 36.9 KILOGRAMS/M^2
BH CV ECHO MEAS - BZI_METRIC_HEIGHT: 175.3 CM
BH CV ECHO MEAS - BZI_METRIC_WEIGHT: 113.4 KG
BH CV ECHO MEAS - CONTRAST EF (2CH): 14.1 ML/M^2
BH CV ECHO MEAS - CONTRAST EF 4CH: 46.8 ML/M^2
BH CV ECHO MEAS - EDV(CUBED): 128.2 ML
BH CV ECHO MEAS - EDV(MOD-SP2): 64 ML
BH CV ECHO MEAS - EDV(MOD-SP4): 154 ML
BH CV ECHO MEAS - EDV(TEICH): 120.6 ML
BH CV ECHO MEAS - EF(CUBED): 31.6 %
BH CV ECHO MEAS - EF(MOD-SP2): 14.1 %
BH CV ECHO MEAS - EF(TEICH): 25.6 %
BH CV ECHO MEAS - ESV(CUBED): 87.7 ML
BH CV ECHO MEAS - ESV(MOD-SP2): 55 ML
BH CV ECHO MEAS - ESV(MOD-SP4): 82 ML
BH CV ECHO MEAS - ESV(TEICH): 89.7 ML
BH CV ECHO MEAS - FS: 11.9 %
BH CV ECHO MEAS - IVS/LVPW: 1.1
BH CV ECHO MEAS - IVSD: 1.4 CM
BH CV ECHO MEAS - LA DIMENSION: 3.8 CM
BH CV ECHO MEAS - LA/AO: 1.3
BH CV ECHO MEAS - LV DIASTOLIC VOL/BSA (35-75): 67.8 ML/M^2
BH CV ECHO MEAS - LV MASS(C)D: 294.4 GRAMS
BH CV ECHO MEAS - LV MASS(C)DI: 129.6 GRAMS/M^2
BH CV ECHO MEAS - LV SYSTOLIC VOL/BSA (12-30): 36.1 ML/M^2
BH CV ECHO MEAS - LVIDD: 5 CM
BH CV ECHO MEAS - LVIDS: 4.4 CM
BH CV ECHO MEAS - LVLD AP2: 7.3 CM
BH CV ECHO MEAS - LVLD AP4: 8.3 CM
BH CV ECHO MEAS - LVLS AP2: 7 CM
BH CV ECHO MEAS - LVLS AP4: 6.6 CM
BH CV ECHO MEAS - LVOT AREA (M): 2.8 CM^2
BH CV ECHO MEAS - LVOT AREA: 3 CM^2
BH CV ECHO MEAS - LVOT DIAM: 1.9 CM
BH CV ECHO MEAS - LVPWD: 1.3 CM
BH CV ECHO MEAS - RVDD: 2.4 CM
BH CV ECHO MEAS - SI(CUBED): 17.9 ML/M^2
BH CV ECHO MEAS - SI(MOD-SP2): 4 ML/M^2
BH CV ECHO MEAS - SI(MOD-SP4): 31.7 ML/M^2
BH CV ECHO MEAS - SI(TEICH): 13.6 ML/M^2
BH CV ECHO MEAS - SV(CUBED): 40.6 ML
BH CV ECHO MEAS - SV(MOD-SP2): 9 ML
BH CV ECHO MEAS - SV(MOD-SP4): 72 ML
BH CV ECHO MEAS - SV(TEICH): 30.9 ML
BNP SERPL-MCNC: 31 PG/ML (ref 0–100)
GLUCOSE BLDC GLUCOMTR-MCNC: 136 MG/DL (ref 70–130)
GLUCOSE BLDC GLUCOMTR-MCNC: 158 MG/DL (ref 70–130)
GLUCOSE BLDC GLUCOMTR-MCNC: 199 MG/DL (ref 70–130)
LV EF 2D ECHO EST: 25 %

## 2017-06-27 PROCEDURE — C8924 2D TTE W OR W/O FOL W/CON,FU: HCPCS

## 2017-06-27 PROCEDURE — G0378 HOSPITAL OBSERVATION PER HR: HCPCS

## 2017-06-27 PROCEDURE — 25010000002 ONDANSETRON PER 1 MG: Performed by: INTERNAL MEDICINE

## 2017-06-27 PROCEDURE — 83880 ASSAY OF NATRIURETIC PEPTIDE: CPT | Performed by: INTERNAL MEDICINE

## 2017-06-27 PROCEDURE — 33249 INSJ/RPLCMT DEFIB W/LEAD(S): CPT | Performed by: INTERNAL MEDICINE

## 2017-06-27 PROCEDURE — C1892 INTRO/SHEATH,FIXED,PEEL-AWAY: HCPCS | Performed by: INTERNAL MEDICINE

## 2017-06-27 PROCEDURE — 93641 EP EVL 1/2CHMB PAC CVDFB TST: CPT | Performed by: INTERNAL MEDICINE

## 2017-06-27 PROCEDURE — 82962 GLUCOSE BLOOD TEST: CPT

## 2017-06-27 PROCEDURE — 25010000002 FENTANYL CITRATE (PF) 100 MCG/2ML SOLUTION: Performed by: INTERNAL MEDICINE

## 2017-06-27 PROCEDURE — 25010000003 CEFAZOLIN IN DEXTROSE 2-4 GM/100ML-% SOLUTION: Performed by: NURSE PRACTITIONER

## 2017-06-27 PROCEDURE — C1722 AICD, SINGLE CHAMBER: HCPCS | Performed by: INTERNAL MEDICINE

## 2017-06-27 PROCEDURE — 25010000002 HEPARIN (PORCINE) PER 1000 UNITS: Performed by: INTERNAL MEDICINE

## 2017-06-27 PROCEDURE — 93306 TTE W/DOPPLER COMPLETE: CPT | Performed by: INTERNAL MEDICINE

## 2017-06-27 PROCEDURE — 25010000002 SULFUR HEXAFLUORIDE MICROSPH 60.7-25 MG RECONSTITUTED SUSPENSION: Performed by: INTERNAL MEDICINE

## 2017-06-27 PROCEDURE — C1777 LEAD, AICD, ENDO SINGLE COIL: HCPCS | Performed by: INTERNAL MEDICINE

## 2017-06-27 PROCEDURE — 25010000002 MIDAZOLAM PER 1 MG: Performed by: INTERNAL MEDICINE

## 2017-06-27 PROCEDURE — 0 IOPAMIDOL PER 1 ML: Performed by: INTERNAL MEDICINE

## 2017-06-27 DEVICE — LD DEFIB DURATA SJ4 58CM 7122Q58: Type: IMPLANTABLE DEVICE | Status: FUNCTIONAL

## 2017-06-27 DEVICE — ICD GEN ELLIPSE NEXT GEN VR DF4 CONN: Type: IMPLANTABLE DEVICE | Status: FUNCTIONAL

## 2017-06-27 RX ORDER — CEFAZOLIN SODIUM 2 G/100ML
2 INJECTION, SOLUTION INTRAVENOUS
Status: DISCONTINUED | OUTPATIENT
Start: 2017-06-27 | End: 2017-06-27 | Stop reason: HOSPADM

## 2017-06-27 RX ORDER — HYDROCODONE BITARTRATE AND ACETAMINOPHEN 5; 325 MG/1; MG/1
1 TABLET ORAL EVERY 4 HOURS PRN
Status: DISCONTINUED | OUTPATIENT
Start: 2017-06-27 | End: 2017-06-28 | Stop reason: HOSPADM

## 2017-06-27 RX ORDER — CARVEDILOL 12.5 MG/1
12.5 TABLET ORAL 2 TIMES DAILY WITH MEALS
Status: DISCONTINUED | OUTPATIENT
Start: 2017-06-27 | End: 2017-06-28 | Stop reason: HOSPADM

## 2017-06-27 RX ORDER — ONDANSETRON 2 MG/ML
4 INJECTION INTRAMUSCULAR; INTRAVENOUS EVERY 6 HOURS PRN
Status: DISCONTINUED | OUTPATIENT
Start: 2017-06-27 | End: 2017-06-27 | Stop reason: HOSPADM

## 2017-06-27 RX ORDER — SODIUM CHLORIDE 9 MG/ML
INJECTION, SOLUTION INTRAVENOUS CONTINUOUS PRN
Status: DISCONTINUED | OUTPATIENT
Start: 2017-06-27 | End: 2017-06-27 | Stop reason: HOSPADM

## 2017-06-27 RX ORDER — FENTANYL CITRATE 50 UG/ML
INJECTION, SOLUTION INTRAMUSCULAR; INTRAVENOUS AS NEEDED
Status: DISCONTINUED | OUTPATIENT
Start: 2017-06-27 | End: 2017-06-27 | Stop reason: HOSPADM

## 2017-06-27 RX ORDER — NITROGLYCERIN 0.4 MG/1
0.4 TABLET SUBLINGUAL
Status: DISCONTINUED | OUTPATIENT
Start: 2017-06-27 | End: 2017-06-27 | Stop reason: HOSPADM

## 2017-06-27 RX ORDER — LISINOPRIL 10 MG/1
10 TABLET ORAL DAILY
Status: DISCONTINUED | OUTPATIENT
Start: 2017-06-28 | End: 2017-06-28 | Stop reason: HOSPADM

## 2017-06-27 RX ORDER — ONDANSETRON 2 MG/ML
INJECTION INTRAMUSCULAR; INTRAVENOUS AS NEEDED
Status: DISCONTINUED | OUTPATIENT
Start: 2017-06-27 | End: 2017-06-27 | Stop reason: HOSPADM

## 2017-06-27 RX ORDER — SODIUM CHLORIDE 0.9 % (FLUSH) 0.9 %
1-10 SYRINGE (ML) INJECTION AS NEEDED
Status: DISCONTINUED | OUTPATIENT
Start: 2017-06-27 | End: 2017-06-27 | Stop reason: HOSPADM

## 2017-06-27 RX ORDER — ONDANSETRON 2 MG/ML
4 INJECTION INTRAMUSCULAR; INTRAVENOUS EVERY 6 HOURS PRN
Status: DISCONTINUED | OUTPATIENT
Start: 2017-06-27 | End: 2017-06-28 | Stop reason: HOSPADM

## 2017-06-27 RX ORDER — SPIRONOLACTONE 25 MG/1
25 TABLET ORAL DAILY
Status: DISCONTINUED | OUTPATIENT
Start: 2017-06-28 | End: 2017-06-28 | Stop reason: HOSPADM

## 2017-06-27 RX ORDER — BUPIVACAINE HYDROCHLORIDE 5 MG/ML
INJECTION, SOLUTION PERINEURAL AS NEEDED
Status: DISCONTINUED | OUTPATIENT
Start: 2017-06-27 | End: 2017-06-27 | Stop reason: HOSPADM

## 2017-06-27 RX ORDER — LIDOCAINE HYDROCHLORIDE 10 MG/ML
INJECTION, SOLUTION INFILTRATION; PERINEURAL AS NEEDED
Status: DISCONTINUED | OUTPATIENT
Start: 2017-06-27 | End: 2017-06-27 | Stop reason: HOSPADM

## 2017-06-27 RX ORDER — FLUMAZENIL 0.1 MG/ML
INJECTION INTRAVENOUS AS NEEDED
Status: DISCONTINUED | OUTPATIENT
Start: 2017-06-27 | End: 2017-06-27 | Stop reason: HOSPADM

## 2017-06-27 RX ORDER — TAMSULOSIN HYDROCHLORIDE 0.4 MG/1
0.4 CAPSULE ORAL NIGHTLY
Status: DISCONTINUED | OUTPATIENT
Start: 2017-06-27 | End: 2017-06-28 | Stop reason: HOSPADM

## 2017-06-27 RX ORDER — MIDAZOLAM HYDROCHLORIDE 1 MG/ML
INJECTION INTRAMUSCULAR; INTRAVENOUS AS NEEDED
Status: DISCONTINUED | OUTPATIENT
Start: 2017-06-27 | End: 2017-06-27 | Stop reason: HOSPADM

## 2017-06-27 RX ORDER — CEFAZOLIN SODIUM 2 G/100ML
2 INJECTION, SOLUTION INTRAVENOUS EVERY 8 HOURS
Status: COMPLETED | OUTPATIENT
Start: 2017-06-28 | End: 2017-06-28

## 2017-06-27 RX ORDER — ACETAMINOPHEN 325 MG/1
650 TABLET ORAL EVERY 4 HOURS PRN
Status: DISCONTINUED | OUTPATIENT
Start: 2017-06-27 | End: 2017-06-27 | Stop reason: HOSPADM

## 2017-06-27 RX ORDER — TORSEMIDE 10 MG/1
10 TABLET ORAL DAILY
Status: DISCONTINUED | OUTPATIENT
Start: 2017-06-28 | End: 2017-06-28 | Stop reason: HOSPADM

## 2017-06-27 RX ORDER — PANTOPRAZOLE SODIUM 40 MG/1
40 TABLET, DELAYED RELEASE ORAL
Status: DISCONTINUED | OUTPATIENT
Start: 2017-06-28 | End: 2017-06-28 | Stop reason: HOSPADM

## 2017-06-27 RX ORDER — PROMETHAZINE HYDROCHLORIDE 25 MG/ML
12.5 INJECTION, SOLUTION INTRAMUSCULAR; INTRAVENOUS EVERY 4 HOURS PRN
Status: DISCONTINUED | OUTPATIENT
Start: 2017-06-27 | End: 2017-06-27 | Stop reason: HOSPADM

## 2017-06-27 RX ORDER — SODIUM CHLORIDE 0.9 % (FLUSH) 0.9 %
1-10 SYRINGE (ML) INJECTION AS NEEDED
Status: DISCONTINUED | OUTPATIENT
Start: 2017-06-27 | End: 2017-06-28 | Stop reason: HOSPADM

## 2017-06-27 RX ADMIN — CARVEDILOL 12.5 MG: 12.5 TABLET, FILM COATED ORAL at 19:53

## 2017-06-27 RX ADMIN — SULFUR HEXAFLUORIDE 3 ML: KIT at 12:10

## 2017-06-27 RX ADMIN — CEFAZOLIN SODIUM 2 G: 2 INJECTION, SOLUTION INTRAVENOUS at 23:35

## 2017-06-27 RX ADMIN — CEFAZOLIN SODIUM 2 G: 2 INJECTION, SOLUTION INTRAVENOUS at 16:04

## 2017-06-27 RX ADMIN — TAMSULOSIN HYDROCHLORIDE 0.4 MG: 0.4 CAPSULE ORAL at 21:34

## 2017-06-28 ENCOUNTER — APPOINTMENT (OUTPATIENT)
Dept: GENERAL RADIOLOGY | Facility: HOSPITAL | Age: 57
End: 2017-06-28

## 2017-06-28 VITALS
RESPIRATION RATE: 20 BRPM | DIASTOLIC BLOOD PRESSURE: 65 MMHG | HEART RATE: 68 BPM | HEIGHT: 69 IN | SYSTOLIC BLOOD PRESSURE: 102 MMHG | BODY MASS INDEX: 37.06 KG/M2 | OXYGEN SATURATION: 95 % | WEIGHT: 250.22 LBS | TEMPERATURE: 98 F

## 2017-06-28 LAB — GLUCOSE BLDC GLUCOMTR-MCNC: 138 MG/DL (ref 70–130)

## 2017-06-28 PROCEDURE — 71020 HC CHEST PA AND LATERAL: CPT

## 2017-06-28 PROCEDURE — 99024 POSTOP FOLLOW-UP VISIT: CPT | Performed by: INTERNAL MEDICINE

## 2017-06-28 PROCEDURE — 82962 GLUCOSE BLOOD TEST: CPT

## 2017-06-28 PROCEDURE — G0378 HOSPITAL OBSERVATION PER HR: HCPCS

## 2017-06-28 PROCEDURE — 25010000003 CEFAZOLIN IN DEXTROSE 2-4 GM/100ML-% SOLUTION: Performed by: INTERNAL MEDICINE

## 2017-06-28 RX ORDER — IBUPROFEN 400 MG/1
400 TABLET ORAL EVERY 4 HOURS PRN
Status: DISCONTINUED | OUTPATIENT
Start: 2017-06-28 | End: 2017-06-28 | Stop reason: HOSPADM

## 2017-06-28 RX ADMIN — CARVEDILOL 12.5 MG: 12.5 TABLET, FILM COATED ORAL at 08:11

## 2017-06-28 RX ADMIN — TORSEMIDE 10 MG: 10 TABLET ORAL at 08:11

## 2017-06-28 RX ADMIN — PANTOPRAZOLE SODIUM 40 MG: 40 TABLET, DELAYED RELEASE ORAL at 05:13

## 2017-06-28 RX ADMIN — LISINOPRIL 10 MG: 10 TABLET ORAL at 08:11

## 2017-06-28 RX ADMIN — CEFAZOLIN SODIUM 2 G: 2 INJECTION, SOLUTION INTRAVENOUS at 08:11

## 2017-06-28 RX ADMIN — SPIRONOLACTONE 25 MG: 25 TABLET, FILM COATED ORAL at 08:11

## 2017-07-06 ENCOUNTER — OFFICE VISIT (OUTPATIENT)
Dept: CARDIOLOGY | Facility: CLINIC | Age: 57
End: 2017-07-06

## 2017-07-06 DIAGNOSIS — I42.0 CARDIOMYOPATHY, DILATED, NONISCHEMIC (HCC): Primary | ICD-10-CM

## 2017-07-06 PROCEDURE — 99024 POSTOP FOLLOW-UP VISIT: CPT | Performed by: INTERNAL MEDICINE

## 2017-07-24 ENCOUNTER — TELEPHONE (OUTPATIENT)
Dept: CARDIOLOGY | Facility: CLINIC | Age: 57
End: 2017-07-24

## 2017-07-24 NOTE — TELEPHONE ENCOUNTER
Abdirahman called to say he has a slight stinging sensation around his SJM single chamber ICD (implanted 6/27/17). There is also a throbbing sensation near his elbow and down into his right hand.  It started about 2 weeks after implant.  It occurs about once a day every other day or so. No swelling in arm.  I checked his Merlin.  No signs of issues with the functioning of the device or lead.

## 2017-10-11 ENCOUNTER — OFFICE VISIT (OUTPATIENT)
Dept: CARDIOLOGY | Facility: CLINIC | Age: 57
End: 2017-10-11

## 2017-10-11 VITALS
HEART RATE: 101 BPM | SYSTOLIC BLOOD PRESSURE: 79 MMHG | HEIGHT: 69 IN | DIASTOLIC BLOOD PRESSURE: 40 MMHG | BODY MASS INDEX: 38.06 KG/M2 | WEIGHT: 257 LBS

## 2017-10-11 DIAGNOSIS — I42.8 NON-ISCHEMIC CARDIOMYOPATHY (HCC): ICD-10-CM

## 2017-10-11 DIAGNOSIS — I50.22 CHRONIC SYSTOLIC CONGESTIVE HEART FAILURE, NYHA CLASS 3 (HCC): Primary | ICD-10-CM

## 2017-10-11 PROCEDURE — 99213 OFFICE O/P EST LOW 20 MIN: CPT | Performed by: INTERNAL MEDICINE

## 2017-10-11 PROCEDURE — 93282 PRGRMG EVAL IMPLANTABLE DFB: CPT | Performed by: INTERNAL MEDICINE

## 2017-10-11 NOTE — PROGRESS NOTES
Abdirahman Mclean  1960  [phone]      10/11/2017    [facility] CARDIOLOGY     Dallas Crespo MD  [address] [place] [id number]    Chief Complaint   Patient presents with   • Congestive Heart Failure   • Cardiomyopathy       Problem List:   Problem List:  1. Nonischemic Cardiomyopathy                        A. Acute CHF with Echocardiogram 1/3/17: EF less than 20%, no significant valvular abnormalities                         B. 2008 [facility] CBH wnl EF 60                        C. 2/13/17 [facility] nl cors EF 20%, Lifevest 2/17                        D. Echocardiogram 5/15/17: EF 20-25%, mild MR             E. ICD implant SJM 6/27/17     2. Class III CHF  3. DM -A1c 8.3 5 yrs  4. HTN  5. HL-on statin  6. TAMAR- cpap intolerant  7. Palpitations  12/16 holter <1%pvc, <1% pac   8. Chronically elevated cpk 4-500  9. GERD  Allergies  Allergies   Allergen Reactions   • Other GI Intolerance     All pain medications cause constipation       Current Medications    Current Outpatient Prescriptions:   •  ALLOPURINOL PO, Take 100 mg by mouth Daily., Disp: , Rfl:   •  carvedilol (COREG) 12.5 MG tablet, Take 1 tablet by mouth 2 (Two) Times a Day., Disp: 180 tablet, Rfl: 2  •  lisinopril (PRINIVIL,ZESTRIL) 10 MG tablet, Take 1 tablet by mouth Daily., Disp: 90 tablet, Rfl: 3  •  metFORMIN (GLUCOPHAGE) 1000 MG tablet, Take 1,000 mg by mouth Daily With Breakfast., Disp: , Rfl:   •  pantoprazole (PROTONIX) 40 MG EC tablet, Take 40 mg by mouth Daily., Disp: , Rfl:   •  Rosuvastatin Calcium (CRESTOR PO), Take 10 mg by mouth Daily., Disp: , Rfl:   •  SITagliptin-MetFORMIN HCl ER (JANUMET XR) 100-1000 MG tablet sustained-release 24 hour, Take 1 tablet by mouth 2 (Two) Times a Day., Disp: , Rfl:   •  spironolactone (ALDACTONE) 25 MG tablet, Take 1 tablet by mouth Daily. (Patient taking differently: Take 25 mg by mouth 2 (Two) Times a Day.), Disp: 90 tablet, Rfl: 3  •  tamsulosin (FLOMAX) 0.4 MG  "capsule 24 hr capsule, Take 1 capsule by mouth Every Night., Disp: , Rfl:   •  torsemide (DEMADEX) 20 MG tablet, Take 0.5 tablets by mouth Daily., Disp: 90 tablet, Rfl: 3    History of Present Illness   HPI    Pt presents for follow up of DCM/CHF. Since the pt has seen us, pt denies any palpitations, CP, LH, and dizziness. Denies any hospitalizations, ER visits, ICD shocks, or TIA/CVA symptoms. Overall feels ok. No side effects to medications.    ROS:  General:  + fatigue, No weight gain or loss  Cardiovascular:  Denies CP, PND, syncope, near syncope, edema or palpitations.  Pulmonary:  + MIRAMONTES, NO cough, or wheezing    Vitals:    10/11/17 1546   BP: (!) 79/40   BP Location: Left arm   Patient Position: Sitting   Pulse: 101   Weight: 257 lb (117 kg)   Height: 69\" (175.3 cm)       PE:    96/56 mmHg  General: NAD  Neck: no JVD, no carotid bruits, no TM  Heart RRR, NL S1, S2, S4 present, no rubs, murmurs  Lungs: CTA, no wheezes, rhonchi, or rales  Abd: soft, non-tender, NL BS  Ext: No musculoskeletal deformities, no edema, cyanosis, or clubbing  Psych: normal mood and affect    Diagnostic Data:  Procedures.    1. Chronic systolic congestive heart failure, NYHA class 3    2. Non-ischemic cardiomyopathy        ICD interrogation: NL fxn, NL battery fxn, No VT or AF    Plan:    1) NICM/DCM with EF 20-25% on meds   2) Class II CHF    F/up in 6 months         "

## 2017-12-07 ENCOUNTER — TELEPHONE (OUTPATIENT)
Dept: CARDIOLOGY | Facility: CLINIC | Age: 57
End: 2017-12-07

## 2018-01-31 ENCOUNTER — HOSPITAL ENCOUNTER (EMERGENCY)
Age: 58
Discharge: HOME | End: 2018-01-31
Payer: COMMERCIAL

## 2018-01-31 VITALS
TEMPERATURE: 97.34 F | SYSTOLIC BLOOD PRESSURE: 130 MMHG | HEART RATE: 87 BPM | RESPIRATION RATE: 18 BRPM | DIASTOLIC BLOOD PRESSURE: 86 MMHG | OXYGEN SATURATION: 96 %

## 2018-01-31 VITALS — BODY MASS INDEX: 36.9 KG/M2

## 2018-01-31 DIAGNOSIS — Z79.899: ICD-10-CM

## 2018-01-31 DIAGNOSIS — I10: ICD-10-CM

## 2018-01-31 DIAGNOSIS — R42: Primary | ICD-10-CM

## 2018-01-31 DIAGNOSIS — Z79.84: ICD-10-CM

## 2018-01-31 DIAGNOSIS — J06.9: ICD-10-CM

## 2018-01-31 DIAGNOSIS — E11.9: ICD-10-CM

## 2018-01-31 PROCEDURE — 87804 INFLUENZA ASSAY W/OPTIC: CPT

## 2018-01-31 PROCEDURE — 71046 X-RAY EXAM CHEST 2 VIEWS: CPT

## 2018-01-31 PROCEDURE — 99202 OFFICE O/P NEW SF 15 MIN: CPT

## 2018-01-31 PROCEDURE — 87880 STREP A ASSAY W/OPTIC: CPT

## 2018-02-05 ENCOUNTER — TELEPHONE (OUTPATIENT)
Dept: CARDIOLOGY | Facility: CLINIC | Age: 58
End: 2018-02-05

## 2018-02-05 RX ORDER — LISINOPRIL 10 MG/1
10 TABLET ORAL DAILY
Qty: 90 TABLET | Refills: 3 | Status: SHIPPED | OUTPATIENT
Start: 2018-02-05 | End: 2018-05-18 | Stop reason: SDUPTHER

## 2018-02-05 RX ORDER — TORSEMIDE 20 MG/1
TABLET ORAL
Qty: 45 TABLET | Refills: 0 | Status: SHIPPED | OUTPATIENT
Start: 2018-02-05 | End: 2018-05-18 | Stop reason: SDUPTHER

## 2018-03-14 ENCOUNTER — CLINICAL SUPPORT NO REQUIREMENTS (OUTPATIENT)
Dept: CARDIOLOGY | Facility: CLINIC | Age: 58
End: 2018-03-14

## 2018-03-14 DIAGNOSIS — I42.8 NON-ISCHEMIC CARDIOMYOPATHY (HCC): ICD-10-CM

## 2018-03-14 DIAGNOSIS — I50.20 SYSTOLIC CONGESTIVE HEART FAILURE, NYHA CLASS 3, UNSPECIFIED CONGESTIVE HEART FAILURE CHRONICITY (HCC): Primary | ICD-10-CM

## 2018-03-14 PROCEDURE — 93296 REM INTERROG EVL PM/IDS: CPT | Performed by: INTERNAL MEDICINE

## 2018-03-14 PROCEDURE — 93295 DEV INTERROG REMOTE 1/2/MLT: CPT | Performed by: INTERNAL MEDICINE

## 2018-04-11 ENCOUNTER — HOSPITAL ENCOUNTER (OUTPATIENT)
Age: 58
End: 2018-04-11
Payer: COMMERCIAL

## 2018-04-11 DIAGNOSIS — E11.9: Primary | ICD-10-CM

## 2018-04-11 LAB
ALBUMIN LEVEL: 4.2 GM/DL (ref 3.4–5)
ALBUMIN/GLOB SERPL: 1.1 {RATIO} (ref 1.1–1.8)
ALP ISO SERPL-ACNC: 124 U/L (ref 46–116)
ALT SERPLBLD-CCNC: 78 U/L (ref 12–78)
ANION GAP SERPL CALC-SCNC: 15.6 MEQ/L (ref 5–15)
AST SERPL QL: 61 U/L (ref 15–37)
BILIRUBIN,TOTAL: 0.5 MG/DL (ref 0.2–1)
BUN SERPL-MCNC: 13 MG/DL (ref 7–18)
CALCIUM SPEC-MCNC: 9 MG/DL (ref 8.5–10.1)
CHLORIDE SPEC-SCNC: 102 MMOL/L (ref 98–107)
CHOLEST SPEC-SCNC: 155 MG/DL (ref 140–200)
CO2 SERPL-SCNC: 28 MMOL/L (ref 21–32)
CREAT BLD-SCNC: 0.99 MG/DL (ref 0.7–1.3)
ESTIMATED GLOMERULAR FILT RATE: 77 ML/MIN (ref 60–?)
GFR (AFRICAN AMERICAN): 94 ML/MIN (ref 60–?)
GLOBULIN SER CALC-MCNC: 3.7 GM/DL (ref 1.3–3.2)
GLUCOSE: 138 MG/DL (ref 74–106)
HBA1C MFR BLD: 7.6 % (ref 0–7)
HDLC SERPL-MCNC: 28 MG/DL (ref 27–67)
POTASSIUM: 4.6 MMOL/L (ref 3.5–5.1)
PROT SERPL-MCNC: 7.9 GM/DL (ref 6.4–8.2)
SODIUM SPEC-SCNC: 141 MMOL/L (ref 136–145)
TRIGLYCERIDES: 502 MG/DL (ref 30–200)

## 2018-04-11 PROCEDURE — 82043 UR ALBUMIN QUANTITATIVE: CPT

## 2018-04-11 PROCEDURE — 82570 ASSAY OF URINE CREATININE: CPT

## 2018-04-11 PROCEDURE — 80053 COMPREHEN METABOLIC PANEL: CPT

## 2018-04-11 PROCEDURE — 36415 COLL VENOUS BLD VENIPUNCTURE: CPT

## 2018-04-11 PROCEDURE — 83036 HEMOGLOBIN GLYCOSYLATED A1C: CPT

## 2018-04-11 PROCEDURE — 80061 LIPID PANEL: CPT

## 2018-05-18 ENCOUNTER — OFFICE VISIT (OUTPATIENT)
Dept: CARDIOLOGY | Facility: CLINIC | Age: 58
End: 2018-05-18

## 2018-05-18 VITALS
HEIGHT: 69 IN | DIASTOLIC BLOOD PRESSURE: 60 MMHG | WEIGHT: 252 LBS | BODY MASS INDEX: 37.33 KG/M2 | HEART RATE: 83 BPM | SYSTOLIC BLOOD PRESSURE: 112 MMHG

## 2018-05-18 DIAGNOSIS — Z95.810 ICD (IMPLANTABLE CARDIOVERTER-DEFIBRILLATOR) IN PLACE: ICD-10-CM

## 2018-05-18 DIAGNOSIS — I42.8 NON-ISCHEMIC CARDIOMYOPATHY (HCC): Primary | ICD-10-CM

## 2018-05-18 DIAGNOSIS — I50.20 SYSTOLIC CONGESTIVE HEART FAILURE, NYHA CLASS 3, UNSPECIFIED CONGESTIVE HEART FAILURE CHRONICITY (HCC): ICD-10-CM

## 2018-05-18 PROCEDURE — 99213 OFFICE O/P EST LOW 20 MIN: CPT | Performed by: PHYSICIAN ASSISTANT

## 2018-05-18 PROCEDURE — 93282 PRGRMG EVAL IMPLANTABLE DFB: CPT | Performed by: PHYSICIAN ASSISTANT

## 2018-05-18 RX ORDER — TORSEMIDE 20 MG/1
10 TABLET ORAL DAILY
Qty: 45 TABLET | Refills: 3 | Status: SHIPPED | OUTPATIENT
Start: 2018-05-18 | End: 2018-07-31 | Stop reason: SDUPTHER

## 2018-05-18 RX ORDER — LISINOPRIL 10 MG/1
10 TABLET ORAL DAILY
Qty: 90 TABLET | Refills: 3 | Status: SHIPPED | OUTPATIENT
Start: 2018-05-18 | End: 2019-09-09 | Stop reason: SDUPTHER

## 2018-05-18 RX ORDER — CARVEDILOL 12.5 MG/1
12.5 TABLET ORAL 2 TIMES DAILY WITH MEALS
Qty: 180 TABLET | Refills: 2 | Status: SHIPPED | OUTPATIENT
Start: 2018-05-18 | End: 2018-12-14 | Stop reason: SDUPTHER

## 2018-05-18 NOTE — PROGRESS NOTES
[facility] at [facility] - Office Note  Abdirahman Mclean         [address] [place] [address]  1960   [phone] (home)      LOCATION:  [facility].  Visit Type: Follow Up.    PCP:  Dallas Crespo MD    05/18/18   Abdirahman Mclean is a 58 y.o.  male  on disability.      Chief Complaint: NICM  Problem List:  1. Nonischemic Cardiomyopathy                        A. Acute CHF with Echocardiogram 1/3/17: EF less than 20%, no significant valvular abnormalities                         B. 2008 [facility] CBH wnl EF 60                        C. 2/13/17 [facility] nl cors EF 20%, Lifevest 2/17                        D. Echocardiogram 5/15/17: EF 20-25%, mild MR                        E. ICD implant [facility] 6/27/17     2. Class III CHF  3. DM -A1c 8.3 5 yrs  4. HTN  5. HL-on statin  6. TAMAR- cpap intolerant  7. Palpitations  12/16 holter <1%pvc, <1% pac   8. Chronically elevated cpk 4-500  9. GERD  Problem   Icd (Implantable Cardioverter-Defibrillator) in Place       Allergies   Allergen Reactions   • Other GI Intolerance     All pain medications cause constipation         Current Outpatient Prescriptions:   •  ALLOPURINOL PO, Take 100 mg by mouth Daily., Disp: , Rfl:   •  carvedilol (COREG) 12.5 MG tablet, Take 1 tablet by mouth 2 (Two) Times a Day., Disp: 180 tablet, Rfl: 2  •  lisinopril (PRINIVIL,ZESTRIL) 10 MG tablet, Take 1 tablet by mouth Daily., Disp: 90 tablet, Rfl: 3  •  lisinopril (PRINIVIL,ZESTRIL) 10 MG tablet, Take 1 tablet by mouth Daily., Disp: 90 tablet, Rfl: 3  •  metFORMIN (GLUCOPHAGE) 1000 MG tablet, Take 1,000 mg by mouth Daily With Breakfast., Disp: , Rfl:   •  pantoprazole (PROTONIX) 40 MG EC tablet, Take 40 mg by mouth Daily., Disp: , Rfl:   •  Rosuvastatin Calcium (CRESTOR PO), Take 10 mg by mouth Daily., Disp: , Rfl:   •  SITagliptin-MetFORMIN HCl ER (JANUMET XR) 100-1000 MG tablet sustained-release 24 hour, Take 1 tablet by mouth 2 (Two) Times a Day., Disp: , Rfl:   •   "spironolactone (ALDACTONE) 25 MG tablet, Take 1 tablet by mouth Daily. (Patient taking differently: Take 25 mg by mouth 2 (Two) Times a Day.), Disp: 90 tablet, Rfl: 3  •  tamsulosin (FLOMAX) 0.4 MG capsule 24 hr capsule, Take 1 capsule by mouth Every Night., Disp: , Rfl:   •  torsemide (DEMADEX) 20 MG tablet, TAKE 1/2 TABLET BY MOUTH ONCE DAILY, Disp: 45 tablet, Rfl: 0    HPI  Today I saw Mr. Mclean in follow-up regarding history of single-chamber St[employer] Norm ICD and nonischemic myopathy with history of chronic class III systolic heart failure.  He is a pleasant 58-year-old gentleman presents [facility] originally followed by Dr. Licona.  In January 2017 he developed heart failure symptoms and a left heart catheterization revealing no significant coronary disease.  He is placed on  medical management but his ejection fraction remaining low therefore he became a candidate for a single chamber ICD for primary prevention.  He presents today stating that he's not had any palpitations or ICD shocks.  He denies any near-syncope or severe events.  He does remaining chronic class III heart failure symptoms and he was evaluated per our research team regarding possibility participating in BH F or fix HF trials.  Overall he feels she's doing well.          The following portions of the patient's history were reviewed in the chart and updated as appropriate: allergies, current medications, past family history, past medical history, past social history, past surgical history and problem list.    Review of Systems   All other systems reviewed and are negative.            height is 175.3 cm (69\") and weight is 114 kg (252 lb). His blood pressure is 112/60 and his pulse is 83.   Physical Exam   Constitutional: He is oriented to person, place, and time. He appears well-developed and well-nourished.   HENT:   Head: Normocephalic and atraumatic.   Eyes: Pupils are equal, round, and reactive to light. Right eye exhibits no discharge. " Left eye exhibits no discharge. No scleral icterus.   Neck: No JVD present. No tracheal deviation present. No thyromegaly present.   Cardiovascular: Normal rate, regular rhythm, normal heart sounds and intact distal pulses.  Exam reveals no gallop and no friction rub.    No murmur heard.  Pulmonary/Chest: Effort normal and breath sounds normal. No stridor. No respiratory distress. He has no wheezes. He has no rales. He exhibits no tenderness.   Musculoskeletal: He exhibits no edema, tenderness or deformity.   Lymphadenopathy:     He has no cervical adenopathy.   Neurological: He is alert and oriented to person, place, and time.   Skin: Skin is warm and dry.   Psychiatric: He has a normal mood and affect.           ECG 12 Lead  Date/Time: 5/18/2018 4:03 PM  Performed by: JOSE MARTIN MIRANDA  Authorized by: JOSE MARTIN MIRANDA   Rhythm: sinus rhythm  Rate: normal  Conduction: conduction normal  QRS axis: normal  Clinical impression: normal ECG           Device Interrogation:  Single-chamber St. Norm ICD set at VVI.  RV paced less than 1%.  R-wave 12.0 V.  Thresholds reversible 0.4 ms.  Impedance 16 ohms.  Battery motion 1%.  Eighth 8 years remaining on battery.  VT zone set at 171 ATP ×3.  The VF zone set at 222.  Assessment/ Plan   Abdirahman was seen today for chest pain.    Diagnoses and all orders for this visit:    Non-ischemic cardiomyopathy    Systolic congestive heart failure, NYHA class 3, unspecified congestive heart failure chronicity    ICD (implantable cardioverter-defibrillator) in place      PLAN:   Today Mr. Milan has symptoms and rest interrogation reveals stable function with no significant arrhythmias.  I discussed with him the importance of limiting sodium intake fluid intake and taikingdaily weights with use of when necessary diuretics for edema or increase in weight.  He is evaluated by research team for consideration of possible block HF and he is currently not interested at this time but he will let  us know if he changes his mind.  He was scheduled follow-up Dr. Licona and [facility] in 3-4 months with return follow Dr. Garcia approximates 9 months or sooner as needed.  He will continue current medical therapy.        SHASTA Peters  5/18/2018 4:00 PM      EMR Dragon/Transcription disclaimer:   Much of this encounter note is an electronic transcription/translation of spoken language to printed text. The electronic translation of spoken language may permit erroneous, or at times, nonsensical words or phrases to be inadvertently transcribed; Although I have reviewed the note for such errors, some may still exist.

## 2018-07-10 ENCOUNTER — TELEPHONE (OUTPATIENT)
Dept: CARDIOLOGY | Facility: CLINIC | Age: 58
End: 2018-07-10

## 2018-07-31 RX ORDER — TORSEMIDE 20 MG/1
TABLET ORAL
Qty: 45 TABLET | Refills: 6 | Status: SHIPPED | OUTPATIENT
Start: 2018-07-31 | End: 2019-10-18 | Stop reason: SDUPTHER

## 2018-08-03 RX ORDER — TORSEMIDE 20 MG/1
TABLET ORAL
Qty: 45 TABLET | Refills: 6 | Status: SHIPPED | OUTPATIENT
Start: 2018-08-03 | End: 2018-11-27

## 2018-08-28 ENCOUNTER — CLINICAL SUPPORT NO REQUIREMENTS (OUTPATIENT)
Dept: CARDIOLOGY | Facility: CLINIC | Age: 58
End: 2018-08-28

## 2018-08-28 DIAGNOSIS — I42.8 NON-ISCHEMIC CARDIOMYOPATHY (HCC): ICD-10-CM

## 2018-08-28 PROCEDURE — 93296 REM INTERROG EVL PM/IDS: CPT | Performed by: INTERNAL MEDICINE

## 2018-08-28 PROCEDURE — 93295 DEV INTERROG REMOTE 1/2/MLT: CPT | Performed by: INTERNAL MEDICINE

## 2018-10-25 ENCOUNTER — HOSPITAL ENCOUNTER (OUTPATIENT)
Age: 58
End: 2018-10-25
Payer: COMMERCIAL

## 2018-10-25 DIAGNOSIS — Z01.818: Primary | ICD-10-CM

## 2018-10-25 DIAGNOSIS — H61.22: ICD-10-CM

## 2018-10-25 DIAGNOSIS — H90.72: ICD-10-CM

## 2018-10-25 LAB
ALBUMIN LEVEL: 3.9 GM/DL (ref 3.4–5)
ALBUMIN/GLOB SERPL: 1.1 {RATIO} (ref 1.1–1.8)
ALP ISO SERPL-ACNC: 125 U/L (ref 46–116)
ALT SERPLBLD-CCNC: 69 U/L (ref 12–78)
ANION GAP SERPL CALC-SCNC: 13.5 MEQ/L (ref 5–15)
AST SERPL QL: 52 U/L (ref 15–37)
BILIRUBIN,TOTAL: 0.6 MG/DL (ref 0.2–1)
BUN SERPL-MCNC: 15 MG/DL (ref 7–18)
CALCIUM SPEC-MCNC: 9 MG/DL (ref 8.5–10.1)
CHLORIDE SPEC-SCNC: 98 MMOL/L (ref 98–107)
CO2 SERPL-SCNC: 31 MMOL/L (ref 21–32)
CREAT BLD-SCNC: 0.95 MG/DL (ref 0.7–1.3)
ESTIMATED GLOMERULAR FILT RATE: 81 ML/MIN (ref 60–?)
GFR (AFRICAN AMERICAN): 98 ML/MIN (ref 60–?)
GLOBULIN SER CALC-MCNC: 3.4 GM/DL (ref 1.3–3.2)
GLUCOSE: 293 MG/DL (ref 74–106)
HCT VFR BLD CALC: 41.4 % (ref 42–52)
HGB BLD-MCNC: 13 G/DL (ref 14.1–18)
MCHC RBC-ENTMCNC: 31.5 G/DL (ref 31.8–35.4)
MCV RBC: 92.6 FL (ref 80–94)
MEAN CORPUSCULAR HEMOGLOBIN: 29.2 PG (ref 27–31.2)
PLATELET # BLD: 269 K/MM3 (ref 142–424)
POTASSIUM: 4.5 MMOL/L (ref 3.5–5.1)
PROT SERPL-MCNC: 7.3 GM/DL (ref 6.4–8.2)
RBC # BLD AUTO: 4.47 M/MM3 (ref 4.6–6.2)
SODIUM SPEC-SCNC: 138 MMOL/L (ref 136–145)
WBC # BLD AUTO: 8.5 K/MM3 (ref 4.8–10.8)

## 2018-10-25 PROCEDURE — 85025 COMPLETE CBC W/AUTO DIFF WBC: CPT

## 2018-10-25 PROCEDURE — 93005 ELECTROCARDIOGRAM TRACING: CPT

## 2018-10-25 PROCEDURE — 80053 COMPREHEN METABOLIC PANEL: CPT

## 2018-10-25 PROCEDURE — 36415 COLL VENOUS BLD VENIPUNCTURE: CPT

## 2018-10-29 ENCOUNTER — TELEPHONE (OUTPATIENT)
Dept: CARDIOLOGY | Facility: CLINIC | Age: 58
End: 2018-10-29

## 2018-11-05 ENCOUNTER — CLINICAL SUPPORT NO REQUIREMENTS (OUTPATIENT)
Dept: CARDIOLOGY | Facility: CLINIC | Age: 58
End: 2018-11-05

## 2018-11-05 DIAGNOSIS — I50.22 CHRONIC SYSTOLIC CONGESTIVE HEART FAILURE (HCC): Primary | ICD-10-CM

## 2018-11-05 DIAGNOSIS — I42.0 CARDIOMYOPATHY, DILATED, NONISCHEMIC (HCC): ICD-10-CM

## 2018-11-27 ENCOUNTER — CLINICAL SUPPORT NO REQUIREMENTS (OUTPATIENT)
Dept: CARDIOLOGY | Facility: CLINIC | Age: 58
End: 2018-11-27

## 2018-11-27 ENCOUNTER — HOSPITAL ENCOUNTER (OUTPATIENT)
Age: 58
End: 2018-11-27
Payer: COMMERCIAL

## 2018-11-27 ENCOUNTER — OFFICE VISIT (OUTPATIENT)
Dept: CARDIOLOGY | Facility: CLINIC | Age: 58
End: 2018-11-27

## 2018-11-27 VITALS
WEIGHT: 250 LBS | DIASTOLIC BLOOD PRESSURE: 61 MMHG | SYSTOLIC BLOOD PRESSURE: 91 MMHG | BODY MASS INDEX: 37.03 KG/M2 | HEIGHT: 69 IN | HEART RATE: 76 BPM

## 2018-11-27 DIAGNOSIS — I42.8 NON-ISCHEMIC CARDIOMYOPATHY (HCC): ICD-10-CM

## 2018-11-27 DIAGNOSIS — E66.9: ICD-10-CM

## 2018-11-27 DIAGNOSIS — E11.65: Primary | ICD-10-CM

## 2018-11-27 DIAGNOSIS — I10 ESSENTIAL HYPERTENSION: ICD-10-CM

## 2018-11-27 DIAGNOSIS — E78.2 MIXED HYPERLIPIDEMIA: ICD-10-CM

## 2018-11-27 DIAGNOSIS — E11.65 TYPE 2 DIABETES MELLITUS WITH HYPERGLYCEMIA, WITH LONG-TERM CURRENT USE OF INSULIN (HCC): ICD-10-CM

## 2018-11-27 DIAGNOSIS — I42.9: ICD-10-CM

## 2018-11-27 DIAGNOSIS — E78.2: ICD-10-CM

## 2018-11-27 DIAGNOSIS — I10: ICD-10-CM

## 2018-11-27 DIAGNOSIS — Z79.4 TYPE 2 DIABETES MELLITUS WITH HYPERGLYCEMIA, WITH LONG-TERM CURRENT USE OF INSULIN (HCC): ICD-10-CM

## 2018-11-27 DIAGNOSIS — I50.22 CHRONIC SYSTOLIC CONGESTIVE HEART FAILURE (HCC): Primary | ICD-10-CM

## 2018-11-27 LAB
ANION GAP SERPL CALC-SCNC: 16 MEQ/L (ref 5–15)
BUN SERPL-MCNC: 22 MG/DL (ref 7–18)
CALCIUM SPEC-MCNC: 9 MG/DL (ref 8.5–10.1)
CHLORIDE SPEC-SCNC: 97 MMOL/L (ref 98–107)
CO2 SERPL-SCNC: 26 MMOL/L (ref 21–32)
CREAT BLD-SCNC: 1.18 MG/DL (ref 0.7–1.3)
ESTIMATED GLOMERULAR FILT RATE: 63 ML/MIN (ref 60–?)
GFR (AFRICAN AMERICAN): 76 ML/MIN (ref 60–?)
GLUCOSE: 145 MG/DL (ref 74–106)
POTASSIUM: 5 MMOL/L (ref 3.5–5.1)
SODIUM SPEC-SCNC: 134 MMOL/L (ref 136–145)

## 2018-11-27 PROCEDURE — 83519 RIA NONANTIBODY: CPT

## 2018-11-27 PROCEDURE — 99214 OFFICE O/P EST MOD 30 MIN: CPT | Performed by: INTERNAL MEDICINE

## 2018-11-27 PROCEDURE — 80048 BASIC METABOLIC PNL TOTAL CA: CPT

## 2018-11-27 PROCEDURE — 86341 ISLET CELL ANTIBODY: CPT

## 2018-11-27 PROCEDURE — 36415 COLL VENOUS BLD VENIPUNCTURE: CPT

## 2018-11-27 NOTE — PROGRESS NOTES
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    Chief Complaint   Patient presents with   • Cardiomyopathy   • Shortness of Breath     1 episode at night      PROBLEM LIST:     1. Ischemic Cardiomyopathy  1. CHF acute EF per [facility] reportedly <30%  2. 12/16 bnp 480  3. 2008 [facility] CBH wnl EF 60  4. 2/17 [facility] nl cors EF 20%  5. 6/27/17 SJ ICD implant  2. DM -A1c11- 8.3 2018  Onset 2011  3. HTN  4. HL-on statin  1. 2016 total chol 104  5. TAMAR- cpap intolerant  6. Palpitations  1. 12/16 holter <1%pvc, <1% pac   7. Chronically elevated cpk 4-500      Allergies  Allergies   Allergen Reactions   • Other GI Intolerance     All pain medications cause constipation       Current Medications    Current Outpatient Medications:   •  ALLOPURINOL PO, Take 100 mg by mouth Daily., Disp: , Rfl:   •  Canagliflozin (INVOKANA PO), Take  by mouth., Disp: , Rfl:   •  carvedilol (COREG) 12.5 MG tablet, Take 1 tablet by mouth 2 (Two) Times a Day With Meals. (Patient taking differently: Take 12.5 mg by mouth Daily.), Disp: 180 tablet, Rfl: 2  •  Insulin Degludec (TRESIBA FLEXTOUCH SC), Inject  under the skin into the appropriate area as directed., Disp: , Rfl:   •  lisinopril (PRINIVIL,ZESTRIL) 10 MG tablet, Take 1 tablet by mouth Daily., Disp: 90 tablet, Rfl: 3  •  pantoprazole (PROTONIX) 40 MG EC tablet, Take 40 mg by mouth Daily., Disp: , Rfl:   •  Rosuvastatin Calcium (CRESTOR PO), Take 10 mg by mouth Daily., Disp: , Rfl:   •  SITagliptin-MetFORMIN HCl ER (JANUMET XR) 100-1000 MG tablet sustained-release 24 hour, Take 1 tablet by mouth 2 (Two) Times a Day., Disp: , Rfl:   •  spironolactone (ALDACTONE) 25 MG tablet, Take 1 tablet by mouth Daily. (Patient taking differently: Take 25 mg by  "mouth 2 (Two) Times a Day.), Disp: 90 tablet, Rfl: 3  •  tamsulosin (FLOMAX) 0.4 MG capsule 24 hr capsule, Take 1 capsule by mouth Every Night., Disp: , Rfl:   •  torsemide (DEMADEX) 20 MG tablet, TAKE 1/2 TABLET BY MOUTH ONCE DAILY, Disp: 45 tablet, Rfl: 6      History of Present Illness     Pt denies any new chest pain, dyspnea, dyspnea on exertion, orthopnea, PND, palpitations, lower extremity edema.  Poor blood sugar control earlier this year with recent adjustment of occasions with improvement A1c.  He states that his shortness of breath is at baseline.  He had one episode of presyncope        ROS:  All systems have been reviewed and are negative with the exception of those mentioned in the HPI.    OBJECTIVE:  Vitals:    11/27/18 1127   BP: 91/61   BP Location: Right arm   Patient Position: Sitting   Pulse: 76   Weight: 113 kg (250 lb)   Height: 175.3 cm (69\")     Physical Exam   Constitutional: He appears well-developed and well-nourished.   Neck: Normal range of motion. Neck supple. No hepatojugular reflux and no JVD present. Carotid bruit is not present. No tracheal deviation present. No thyromegaly present.   Cardiovascular: Normal rate, regular rhythm, S1 normal, S2 normal, intact distal pulses and normal pulses. PMI is not displaced. Exam reveals no gallop, no distant heart sounds, no friction rub, no midsystolic click and no opening snap.   No murmur heard.  Pulses:       Radial pulses are 2+ on the right side, and 2+ on the left side.        Dorsalis pedis pulses are 2+ on the right side, and 2+ on the left side.        Posterior tibial pulses are 2+ on the right side, and 2+ on the left side.   Pulmonary/Chest: Effort normal and breath sounds normal. He has no wheezes. He has no rales.   Abdominal: Soft. Bowel sounds are normal. He exhibits no mass. There is no tenderness. There is no guarding.   Musculoskeletal: He exhibits edema.       Diagnostic Data:  Procedures      ASSESSMENT:   Diagnosis Plan "   1. Chronic systolic congestive heart failure (CMS/HCC)     2. Essential hypertension     3. Mixed hyperlipidemia     4. Type 2 diabetes mellitus with hyperglycemia, with long-term current use of insulin (CMS/[facility])         PLAN:  Nonischemic CHF appears euvolemic at current have discussed the adamant need for compliance with medication and dietary therapy    ICD followed per EP    Hypertension controlled on current regimen with mild hypotension on concomitant diuretic and diabetic medication    Dyslipidemia on statin therapy     Dallas Crespo MD, thank you for referring Mr. Mclean for evaluation.  I have forwarded my electronically generated recommendations to you for review.  Please do not hesitate to call with any questions.    Scribed for Danny Licona MD by Traci Fuentes PA-C. 11/27/2018  11:53 AM   Danny Licona MD, FACC

## 2018-11-30 LAB — GAD-65: 5.9 U/ML (ref 0–5)

## 2018-12-14 RX ORDER — CARVEDILOL 12.5 MG/1
TABLET ORAL
Qty: 180 TABLET | Refills: 5 | Status: SHIPPED | OUTPATIENT
Start: 2018-12-14 | End: 2020-07-16 | Stop reason: SDUPTHER

## 2019-02-20 ENCOUNTER — OFFICE VISIT (OUTPATIENT)
Dept: CARDIOLOGY | Facility: CLINIC | Age: 59
End: 2019-02-20

## 2019-02-20 VITALS
HEIGHT: 69 IN | OXYGEN SATURATION: 97 % | BODY MASS INDEX: 37.33 KG/M2 | HEART RATE: 62 BPM | SYSTOLIC BLOOD PRESSURE: 98 MMHG | WEIGHT: 252 LBS | DIASTOLIC BLOOD PRESSURE: 64 MMHG

## 2019-02-20 DIAGNOSIS — I42.8 NON-ISCHEMIC CARDIOMYOPATHY (HCC): Primary | ICD-10-CM

## 2019-02-20 DIAGNOSIS — I48.0 PAROXYSMAL A-FIB (HCC): ICD-10-CM

## 2019-02-20 DIAGNOSIS — I50.22 CHRONIC SYSTOLIC HEART FAILURE (HCC): Primary | ICD-10-CM

## 2019-02-20 PROCEDURE — 93282 PRGRMG EVAL IMPLANTABLE DFB: CPT | Performed by: INTERNAL MEDICINE

## 2019-02-20 PROCEDURE — 99213 OFFICE O/P EST LOW 20 MIN: CPT | Performed by: INTERNAL MEDICINE

## 2019-02-20 RX ORDER — ASPIRIN 81 MG/1
81 TABLET, CHEWABLE ORAL DAILY
COMMUNITY

## 2019-02-20 NOTE — PROGRESS NOTES
Abdirahman DAVID Vinay  1960  [phone]    02/20/2019    [facility] CARDIOLOGY     Dallas Crespo MD  [address] [facility]  [place] [address]    Chief Complaint   Patient presents with   • Congestive Heart Failure       Problem List:   1. Nonischemic Cardiomyopathy  1. CHF acute EF per [facility] reportedly <30%  2. 12/16 bnp 480  3. 2008 [facility] CBH wnl EF 60  4. 2/17 [facility] nl cors EF 20%  5. 6/27/17 SJM ICD implant  2. DM -A1c11- 8.3 2018  Onset 2011  3. HTN  4. HL-on statin  1. 2016 total chol 104  5. TAMAR- cpap intolerant  6. Palpitations  1. 12/16 holter <1%pvc, <1% pac   7. Chronically elevated cpk 4-500        Allergies  Allergies   Allergen Reactions   • Other GI Intolerance     All pain medications cause constipation       Current Medications    Current Outpatient Medications:   •  ALLOPURINOL PO, Take 100 mg by mouth Daily., Disp: , Rfl:   •  aspirin 81 MG chewable tablet, Chew 81 mg Daily., Disp: , Rfl:   •  carvedilol (COREG) 12.5 MG tablet, TAKE 1 TABLET BY MOUTH TWICE DAILY, Disp: 180 tablet, Rfl: 5  •  Insulin Degludec (TRESIBA FLEXTOUCH SC), Inject  under the skin into the appropriate area as directed., Disp: , Rfl:   •  lisinopril (PRINIVIL,ZESTRIL) 10 MG tablet, Take 1 tablet by mouth Daily., Disp: 90 tablet, Rfl: 3  •  pantoprazole (PROTONIX) 40 MG EC tablet, Take 40 mg by mouth Daily., Disp: , Rfl:   •  Rosuvastatin Calcium (CRESTOR PO), Take 10 mg by mouth Daily., Disp: , Rfl:   •  SITagliptin-MetFORMIN HCl ER (JANUMET XR) 100-1000 MG tablet sustained-release 24 hour, Take 2 tablets by mouth Daily., Disp: , Rfl:   •  spironolactone (ALDACTONE) 25 MG tablet, Take 1 tablet by mouth Daily. (Patient taking differently: Take 25 mg by mouth 2 (Two) Times a Day.), Disp: 90 tablet, Rfl: 3  •  tamsulosin (FLOMAX) 0.4 MG capsule 24 hr capsule, Take 1 capsule by mouth Every Night., Disp: , Rfl:   •  torsemide (DEMADEX) 20 MG tablet, TAKE 1/2 TABLET BY MOUTH ONCE DAILY, Disp: 45  "tablet, Rfl: 6    History of Present Illness:      Pt presents for follow up of NICM, CHF, and ICD check. Since we last saw the pt, he has overall been doing well. He has chronic MIRAMOTNES which is unchanged and is mild. No anginal type chest pain. He has occasional sharp, needle like chest pains that occur at random and last around one minute. He denies any palpitations. No syncope.  No hospitalizations or ER visits. No weight changes. He does not check his BP at  Home.     ROS:  General:  Denies fatigue, weight gain or loss  Cardiovascular:  Denies CP, PND, syncope, near syncope,NO edema or palpitations.  Pulmonary:  Denies MIRAMONTES, cough, + occasional wheezing      Vitals:    02/20/19 1254 02/20/19 1359   BP: 98/64 98/64   BP Location: Right arm    Patient Position: Sitting    Pulse: 62    SpO2: 97%    Weight: 114 kg (252 lb)    Height: 175.3 cm (69\")      Body mass index is 37.21 kg/m².  PE:  General: NAD. A & O x 3  Neck: no JVD, no carotid bruits, no TM  Heart RRR, NL S1, S2, S4 present, no rubs, murmurs  Lungs: CTA, no wheezes, rhonchi, or rales  Abd: soft, non-tender, NL BS  Ext: No musculoskeletal deformities, no edema, cyanosis, or clubbing  Psych: normal mood and affect    Diagnostic Data:    ICD check: normal function. 7.5 years on battery. One episode of atrial fibrillation with RVR 11/3/19 - one hour duration.       ECG 12 Lead  Date/Time: 2/20/2019 2:01 PM  Performed by: David Garcia MD  Authorized by: David Garcia MD   Comparison: compared with previous ECG from 5/18/2018  Similar to previous ECG  Rhythm: sinus rhythm  BPM: 89              1. Non-ischemic cardiomyopathy (CMS/HCC)    2. Paroxysmal A-fib (CMS/HCC)          Plan:  1. NICM:  - EF 20% in 2017  - ICD function normal  - appears to be compensated    2. PAF:  - one isolated episode  - will order MCOT to assess for further episodes.   - CHADSVasc = 3, continue ASA for now  - recheck echocardiogram    F/up in 6 months    Kathy Jenkins " [facility]  2/20/2019   2:05 PM

## 2019-02-28 ENCOUNTER — HOSPITAL ENCOUNTER (OUTPATIENT)
Age: 59
End: 2019-02-28
Payer: COMMERCIAL

## 2019-02-28 DIAGNOSIS — I50.22: Primary | ICD-10-CM

## 2019-02-28 PROCEDURE — 93306 TTE W/DOPPLER COMPLETE: CPT

## 2019-03-26 ENCOUNTER — CLINICAL SUPPORT NO REQUIREMENTS (OUTPATIENT)
Dept: CARDIOLOGY | Facility: CLINIC | Age: 59
End: 2019-03-26

## 2019-03-26 DIAGNOSIS — I42.8 NON-ISCHEMIC CARDIOMYOPATHY (HCC): ICD-10-CM

## 2019-03-26 PROCEDURE — 93296 REM INTERROG EVL PM/IDS: CPT | Performed by: INTERNAL MEDICINE

## 2019-03-26 PROCEDURE — 93295 DEV INTERROG REMOTE 1/2/MLT: CPT | Performed by: INTERNAL MEDICINE

## 2019-03-29 DIAGNOSIS — I48.0 AF (PAROXYSMAL ATRIAL FIBRILLATION) (HCC): Primary | ICD-10-CM

## 2019-04-22 ENCOUNTER — TELEPHONE (OUTPATIENT)
Dept: CARDIOLOGY | Facility: CLINIC | Age: 59
End: 2019-04-22

## 2019-05-15 ENCOUNTER — AMBULATORY SURGICAL CENTER (OUTPATIENT)
Dept: URBAN - METROPOLITAN AREA SURGERY 10 | Facility: SURGERY | Age: 59
End: 2019-05-15

## 2019-05-15 ENCOUNTER — OFFICE (OUTPATIENT)
Dept: URBAN - METROPOLITAN AREA PATHOLOGY 4 | Facility: PATHOLOGY | Age: 59
End: 2019-05-15

## 2019-05-15 DIAGNOSIS — D12.8 BENIGN NEOPLASM OF RECTUM: ICD-10-CM

## 2019-05-15 DIAGNOSIS — K64.1 SECOND DEGREE HEMORRHOIDS: ICD-10-CM

## 2019-05-15 DIAGNOSIS — Z86.010 PERSONAL HISTORY OF COLONIC POLYPS: ICD-10-CM

## 2019-05-15 DIAGNOSIS — Z85.038 PERSONAL HISTORY OF OTHER MALIGNANT NEOPLASM OF LARGE INTEST: ICD-10-CM

## 2019-05-15 PROCEDURE — 45380 COLONOSCOPY AND BIOPSY: CPT | Mod: 33,59 | Performed by: INTERNAL MEDICINE

## 2019-05-15 PROCEDURE — 88305 TISSUE EXAM BY PATHOLOGIST: CPT | Performed by: INTERNAL MEDICINE

## 2019-05-15 PROCEDURE — 46930 DESTROY INTERNAL HEMORRHOIDS: CPT | Mod: 33 | Performed by: INTERNAL MEDICINE

## 2019-05-15 PROCEDURE — 45380 COLONOSCOPY AND BIOPSY: CPT | Mod: 59,33 | Performed by: INTERNAL MEDICINE

## 2019-08-01 ENCOUNTER — CLINICAL SUPPORT NO REQUIREMENTS (OUTPATIENT)
Dept: CARDIOLOGY | Facility: CLINIC | Age: 59
End: 2019-08-01

## 2019-08-01 DIAGNOSIS — I42.8 NON-ISCHEMIC CARDIOMYOPATHY (HCC): ICD-10-CM

## 2019-08-01 PROCEDURE — 93296 REM INTERROG EVL PM/IDS: CPT | Performed by: INTERNAL MEDICINE

## 2019-08-01 PROCEDURE — 93295 DEV INTERROG REMOTE 1/2/MLT: CPT | Performed by: INTERNAL MEDICINE

## 2019-09-09 RX ORDER — LISINOPRIL 10 MG/1
10 TABLET ORAL DAILY
Qty: 90 TABLET | Refills: 3 | Status: SHIPPED | OUTPATIENT
Start: 2019-09-09 | End: 2020-05-27

## 2019-09-19 ENCOUNTER — CLINICAL SUPPORT NO REQUIREMENTS (OUTPATIENT)
Dept: CARDIOLOGY | Facility: CLINIC | Age: 59
End: 2019-09-19

## 2019-09-19 DIAGNOSIS — I50.20 SYSTOLIC CONGESTIVE HEART FAILURE, UNSPECIFIED HF CHRONICITY (HCC): Primary | ICD-10-CM

## 2019-09-25 ENCOUNTER — OFFICE VISIT (OUTPATIENT)
Dept: CARDIOLOGY | Facility: CLINIC | Age: 59
End: 2019-09-25

## 2019-09-25 VITALS
HEART RATE: 95 BPM | HEIGHT: 69 IN | SYSTOLIC BLOOD PRESSURE: 116 MMHG | DIASTOLIC BLOOD PRESSURE: 64 MMHG | WEIGHT: 251 LBS | OXYGEN SATURATION: 95 % | BODY MASS INDEX: 37.18 KG/M2

## 2019-09-25 DIAGNOSIS — I50.22 CHRONIC SYSTOLIC CONGESTIVE HEART FAILURE (HCC): Primary | ICD-10-CM

## 2019-09-25 DIAGNOSIS — I10 ESSENTIAL HYPERTENSION: ICD-10-CM

## 2019-09-25 DIAGNOSIS — I48.0 PAF (PAROXYSMAL ATRIAL FIBRILLATION) (HCC): ICD-10-CM

## 2019-09-25 PROCEDURE — 99213 OFFICE O/P EST LOW 20 MIN: CPT | Performed by: INTERNAL MEDICINE

## 2019-09-25 NOTE — PROGRESS NOTES
Abdirahman DAVID Vinay  1960  [phone]      09/25/2019    [facility] CARDIOLOGY     Dallas Crespo MD  [address] [facility]  [place] [address]    Chief Complaint   Patient presents with   • Cardiomyopathy        Problem List:   1. Nonischemic Cardiomyopathy  1. CHF acute EF per [facility] reportedly <30%  2. 12/16 bnp 480  3. 2008 [facility] CBH wnl EF 60  4. 2/17 [facility] nl cors EF 20%  5. 6/27/17 SJM ICD implant  6. ER 2/2019 NSR/ST, PAC  7.   8. Echo 2/2019 LVEF 35-40%  2. DM -A1c11- 8.3 2018  Onset 2011  3. HTN  4. HL-on statin  1. 2016 total chol 104  5. TAMAR- cpap intolerant  6. Palpitations  1. 12/16 holter <1%pvc, <1% pac   7. Chronically elevated cpk 4-500    Allergies  Allergies   Allergen Reactions   • Other GI Intolerance     All pain medications cause constipation       Current Medications    Current Outpatient Medications:   •  ALLOPURINOL PO, Take 100 mg by mouth Daily., Disp: , Rfl:   •  aspirin 81 MG chewable tablet, Chew 81 mg Daily., Disp: , Rfl:   •  carvedilol (COREG) 12.5 MG tablet, TAKE 1 TABLET BY MOUTH TWICE DAILY (Patient taking differently: TAKE 1 TABLET BY MOUTH DAILY), Disp: 180 tablet, Rfl: 5  •  Insulin Degludec (TRESIBA FLEXTOUCH SC), Inject 44 Units under the skin into the appropriate area as directed Every Night., Disp: , Rfl:   •  lisinopril (PRINIVIL,ZESTRIL) 10 MG tablet, Take 1 tablet by mouth Daily., Disp: 90 tablet, Rfl: 3  •  pantoprazole (PROTONIX) 40 MG EC tablet, Take 40 mg by mouth Daily., Disp: , Rfl:   •  Rosuvastatin Calcium (CRESTOR PO), Take 10 mg by mouth Daily., Disp: , Rfl:   •  SITagliptin-MetFORMIN HCl ER (JANUMET XR) 100-1000 MG tablet sustained-release 24 hour, Take 2 tablets by mouth Daily., Disp: , Rfl:   •  spironolactone (ALDACTONE) 25 MG tablet, Take 1 tablet by mouth Daily., Disp: 90 tablet, Rfl: 3  •  tamsulosin (FLOMAX) 0.4 MG capsule 24 hr capsule, Take 1 capsule by mouth Every Night., Disp: , Rfl:   •  torsemide (DEMADEX) 20 MG  "tablet, TAKE 1/2 TABLET BY MOUTH ONCE DAILY, Disp: 45 tablet, Rfl: 6    History of Present Illness     Pt presents for follow up of CHF/VT/DCM. Since the pt has seen us, pt denies any palpitations, CP, LH, and dizziness. Denies any hospitalizations, ER visits, ICD shocks, or TIA/CVA symptoms. Overall feels ok. No side effects to medications.BP well controlled at home    ROS:  General:  +fatigue, No weight gain or loss  Cardiovascular:  Denies CP, PND, syncope, near syncope, edema or palpitations.  Pulmonary:  + chromic mild MIRAMONTES, no cough, or wheezing    Vitals:    09/25/19 1605   BP: 116/64   BP Location: Left arm   Patient Position: Sitting   Pulse: 95   SpO2: 95%   Weight: 114 kg (251 lb)   Height: 175.3 cm (69\")       PE:  General: NAD  Neck: no JVD, no carotid bruits, no TM  Heart RRR, NL S1, S2, S4 present, no rubs, murmurs  Lungs: CTA, no wheezes, rhonchi, or rales  Abd: soft, non-tender, NL BS  Ext: No musculoskeletal deformities, no edema, cyanosis, or clubbing  Psych: normal mood and affect    Diagnostic Data:  Procedures.    1. Chronic systolic congestive heart failure (CMS/HCC)    2. PAF (paroxysmal atrial fibrillation) (CMS/HCC)    3. Essential hypertension        ICD interrogation: NL fxn, NL battery fxn, NSVT, No AF      Plan:  1. NICM: on appropriate meds: no changes for now  - EF 20% in 2017, now LVEF 35-40%  - ICD function normal  - appears to be compensated     2. PAF: no recurrence    3. HTN: BP well controlled    F/up in 6 months         "

## 2019-10-18 RX ORDER — TORSEMIDE 20 MG/1
TABLET ORAL
Qty: 45 TABLET | Refills: 6 | Status: SHIPPED | OUTPATIENT
Start: 2019-10-18 | End: 2021-01-28

## 2019-12-08 ENCOUNTER — CLINICAL SUPPORT NO REQUIREMENTS (OUTPATIENT)
Dept: CARDIOLOGY | Facility: CLINIC | Age: 59
End: 2019-12-08

## 2019-12-08 DIAGNOSIS — I42.8 NON-ISCHEMIC CARDIOMYOPATHY (HCC): ICD-10-CM

## 2019-12-08 PROCEDURE — 93296 REM INTERROG EVL PM/IDS: CPT | Performed by: INTERNAL MEDICINE

## 2019-12-08 PROCEDURE — 93295 DEV INTERROG REMOTE 1/2/MLT: CPT | Performed by: INTERNAL MEDICINE

## 2019-12-26 ENCOUNTER — TELEPHONE (OUTPATIENT)
Dept: CARDIOLOGY | Facility: CLINIC | Age: 59
End: 2019-12-26

## 2019-12-26 NOTE — TELEPHONE ENCOUNTER
Mr. Mclean is not aware of high HRs.  He said he is having to help his wife because of a bad knee and rates may be due to Kika activities.  Patient is to call if he has high rates.

## 2020-01-10 ENCOUNTER — HOSPITAL ENCOUNTER (OUTPATIENT)
Age: 60
End: 2020-01-10
Payer: COMMERCIAL

## 2020-01-10 DIAGNOSIS — J18.0: Primary | ICD-10-CM

## 2020-01-10 LAB
ALBUMIN LEVEL: 3.9 GM/DL (ref 3.4–5)
ALBUMIN/GLOB SERPL: 1.1 {RATIO} (ref 1.1–1.8)
ALP ISO SERPL-ACNC: 107 U/L (ref 46–116)
ALT SERPLBLD-CCNC: 97 U/L (ref 12–78)
ANION GAP SERPL CALC-SCNC: 13.8 MEQ/L (ref 5–15)
AST SERPL QL: 66 U/L (ref 15–37)
BILIRUBIN,TOTAL: 0.4 MG/DL (ref 0.2–1)
BUN SERPL-MCNC: 16 MG/DL (ref 7–18)
CALCIUM SPEC-MCNC: 8.7 MG/DL (ref 8.5–10.1)
CHLORIDE SPEC-SCNC: 102 MMOL/L (ref 98–107)
CO2 SERPL-SCNC: 26 MMOL/L (ref 21–32)
CREAT BLD-SCNC: 0.86 MG/DL (ref 0.7–1.3)
ESTIMATED GLOMERULAR FILT RATE: 90 ML/MIN (ref 60–?)
GFR (AFRICAN AMERICAN): 109 ML/MIN (ref 60–?)
GLOBULIN SER CALC-MCNC: 3.4 GM/DL (ref 1.3–3.2)
GLUCOSE: 140 MG/DL (ref 74–106)
HCT VFR BLD CALC: 43.1 % (ref 42–52)
HGB BLD-MCNC: 13.7 G/DL (ref 14.1–18)
MCHC RBC-ENTMCNC: 31.7 G/DL (ref 31.8–35.4)
MCV RBC: 88.8 FL (ref 80–94)
MEAN CORPUSCULAR HEMOGLOBIN: 28.2 PG (ref 27–31.2)
PLATELET # BLD: 288 K/MM3 (ref 142–424)
POTASSIUM: 4.8 MMOL/L (ref 3.5–5.1)
PROT SERPL-MCNC: 7.3 GM/DL (ref 6.4–8.2)
RBC # BLD AUTO: 4.86 M/MM3 (ref 4.6–6.2)
SODIUM SPEC-SCNC: 137 MMOL/L (ref 136–145)
WBC # BLD AUTO: 9.3 K/MM3 (ref 4.8–10.8)

## 2020-01-10 PROCEDURE — 80053 COMPREHEN METABOLIC PANEL: CPT

## 2020-01-10 PROCEDURE — 85025 COMPLETE CBC W/AUTO DIFF WBC: CPT

## 2020-01-10 PROCEDURE — 71046 X-RAY EXAM CHEST 2 VIEWS: CPT

## 2020-01-10 PROCEDURE — 36415 COLL VENOUS BLD VENIPUNCTURE: CPT

## 2020-01-21 NOTE — PROGRESS NOTES
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    PROBLEM LIST:     1. Ischemic Cardiomyopathy  1. 2008 [facility] CBH wnl EF 60  2. 2/17 [facility] nl cors EF 20%  3. 6/27/17 SJM ICD implant-GFT  4. 2/19 [facility] echo EF 35-40%  2. DM -A1c11- 8.3 2018, Dr CECE Brady et al  Onset 2011  3. HTN  4. HL-on statin  1. 2016 total chol 104  5. TAMAR- cpap intolerant  6. Palpitations  1. 12/16 holter <1%pvc, <1% pac   7. Chronically elevated cpk 4-500      Chief Complaint   Patient presents with   • Congestive Heart Failure       Allergies  Allergies   Allergen Reactions   • Other GI Intolerance     All pain medications cause constipation       Current Medications    Current Outpatient Medications:   •  ALLOPURINOL PO, Take 100 mg by mouth Daily., Disp: , Rfl:   •  aspirin 81 MG chewable tablet, Chew 81 mg Daily., Disp: , Rfl:   •  carvedilol (COREG) 12.5 MG tablet, TAKE 1 TABLET BY MOUTH TWICE DAILY (Patient taking differently: Take 12.5 mg by mouth Daily.), Disp: 180 tablet, Rfl: 5  •  Insulin Degludec (TRESIBA FLEXTOUCH SC), Inject 50 Units under the skin into the appropriate area as directed Every Night., Disp: , Rfl:   •  lisinopril (PRINIVIL,ZESTRIL) 10 MG tablet, Take 1 tablet by mouth Daily., Disp: 90 tablet, Rfl: 3  •  pantoprazole (PROTONIX) 40 MG EC tablet, Take 40 mg by mouth Daily., Disp: , Rfl:   •  Rosuvastatin Calcium (CRESTOR PO), Take 10 mg by mouth Daily., Disp: , Rfl:   •  SITagliptin-MetFORMIN HCl ER (JANUMET XR) 100-1000 MG tablet sustained-release 24 hour, Take 2 tablets by mouth Daily., Disp: , Rfl:   •  spironolactone (ALDACTONE) 25 MG tablet, Take 1 tablet by mouth Daily., Disp: 90 tablet, Rfl: 3  •  tamsulosin (FLOMAX) 0.4 MG capsule 24 hr capsule, Take 1 capsule by mouth Every Night.,  "Disp: , Rfl:   •  torsemide (DEMADEX) 20 MG tablet, TAKE 1/2 TABLET BY MOUTH EVERY DAY, Disp: 45 tablet, Rfl: 6      History of Present Illness   Abdirahman Mclean is a 59 y.o. year old male here for follow up.    Pt denies any chest pain, dyspnea, dyspnea on exertion, orthopnea, PND, palpitations, lower extremity edema, or claudication.  He was noted with higher heart rates over Christmas holiday when he had a respiratory illness on his home TTM's.  He presents with no new symptoms.  He is largely inactive of late as he states he is helping attend his wife following knee surgery.  Historically he had been in cardiac rehab/exercises at the [facility]      OBJECTIVE:  Vitals:    01/23/20 1027   BP: 114/70   BP Location: Right arm   Patient Position: Sitting   Pulse: 82   SpO2: 96%   Weight: 114 kg (252 lb)   Height: 175.3 cm (69\")     Physical Exam   Constitutional: He appears well-developed and well-nourished.   Neck: Normal range of motion. Neck supple. No hepatojugular reflux and no JVD present. Carotid bruit is not present. No tracheal deviation present. No thyromegaly present.   Cardiovascular: Normal rate, regular rhythm, S1 normal, S2 normal, intact distal pulses and normal pulses. PMI is not displaced. Exam reveals no gallop, no distant heart sounds, no friction rub, no midsystolic click and no opening snap.   No murmur heard.  Pulses:       Radial pulses are 2+ on the right side, and 2+ on the left side.        Dorsalis pedis pulses are 2+ on the right side, and 2+ on the left side.        Posterior tibial pulses are 2+ on the right side, and 2+ on the left side.   Pulmonary/Chest: Effort normal and breath sounds normal. He has no wheezes. He has no rales.   Device clean dry and intact   Abdominal: Soft. Bowel sounds are normal. He exhibits no mass. There is no tenderness. There is no guarding.       Diagnostic Data:  Procedures      ASSESSMENT:   Diagnosis Plan   1. Chronic systolic congestive heart " failure (CMS/[facility])     2. Essential hypertension     3. Type 2 diabetes mellitus with hyperglycemia, with long-term current use of insulin (CMS/[facility])         PLAN:  CHF chronic systolic nonischemic continue current medical regimen  Echocardiogram  next  year    Hypertension controlled carvedilol lisinopril spironolactone    Diabetes insulin requiring oral agents as well followed per Dr. Brady et al counseled regarding need for compliance with low carbohydrate intake    Dallas Crespo MD, thank you for referring Mr. Mclean for evaluation.  I have forwarded my electronically generated recommendations to you for review.  Please do not hesitate to call with any questions.      Danny Licona MD, FACC

## 2020-01-23 ENCOUNTER — OFFICE VISIT (OUTPATIENT)
Dept: CARDIOLOGY | Facility: CLINIC | Age: 60
End: 2020-01-23

## 2020-01-23 VITALS
OXYGEN SATURATION: 96 % | WEIGHT: 252 LBS | HEIGHT: 69 IN | SYSTOLIC BLOOD PRESSURE: 114 MMHG | BODY MASS INDEX: 37.33 KG/M2 | DIASTOLIC BLOOD PRESSURE: 70 MMHG | HEART RATE: 82 BPM

## 2020-01-23 DIAGNOSIS — E11.65 TYPE 2 DIABETES MELLITUS WITH HYPERGLYCEMIA, WITH LONG-TERM CURRENT USE OF INSULIN (HCC): ICD-10-CM

## 2020-01-23 DIAGNOSIS — I10 ESSENTIAL HYPERTENSION: ICD-10-CM

## 2020-01-23 DIAGNOSIS — Z79.4 TYPE 2 DIABETES MELLITUS WITH HYPERGLYCEMIA, WITH LONG-TERM CURRENT USE OF INSULIN (HCC): ICD-10-CM

## 2020-01-23 DIAGNOSIS — I50.22 CHRONIC SYSTOLIC CONGESTIVE HEART FAILURE (HCC): Primary | ICD-10-CM

## 2020-01-23 PROCEDURE — 99214 OFFICE O/P EST MOD 30 MIN: CPT | Performed by: INTERNAL MEDICINE

## 2020-03-04 ENCOUNTER — HOSPITAL ENCOUNTER (OUTPATIENT)
Dept: CARDIOLOGY | Facility: HOSPITAL | Age: 60
Discharge: HOME | End: 2020-03-04
Admitting: INTERNAL MEDICINE

## 2020-03-04 VITALS — WEIGHT: 252 LBS | HEIGHT: 69 IN | BODY MASS INDEX: 37.33 KG/M2

## 2020-03-04 DIAGNOSIS — I50.22 CHRONIC SYSTOLIC CONGESTIVE HEART FAILURE (HCC): ICD-10-CM

## 2020-03-04 PROCEDURE — 93306 TTE W/DOPPLER COMPLETE: CPT

## 2020-03-04 PROCEDURE — 93306 TTE W/DOPPLER COMPLETE: CPT | Performed by: INTERNAL MEDICINE

## 2020-03-05 LAB
BH CV ECHO MEAS - AO ROOT AREA (BSA CORRECTED): 1.3
BH CV ECHO MEAS - AO ROOT AREA: 6.6 CM^2
BH CV ECHO MEAS - AO ROOT DIAM: 2.9 CM
BH CV ECHO MEAS - BSA(HAYCOCK): 2.4 M^2
BH CV ECHO MEAS - BSA: 2.3 M^2
BH CV ECHO MEAS - BZI_BMI: 37.2 KILOGRAMS/M^2
BH CV ECHO MEAS - BZI_METRIC_HEIGHT: 175.3 CM
BH CV ECHO MEAS - BZI_METRIC_WEIGHT: 114.3 KG
BH CV ECHO MEAS - EDV(CUBED): 122.1 ML
BH CV ECHO MEAS - EDV(MOD-SP2): 148 ML
BH CV ECHO MEAS - EDV(MOD-SP4): 171 ML
BH CV ECHO MEAS - EDV(TEICH): 116.1 ML
BH CV ECHO MEAS - EF(CUBED): 35.9 %
BH CV ECHO MEAS - EF(MOD-BP): 30 %
BH CV ECHO MEAS - EF(MOD-SP2): 35.1 %
BH CV ECHO MEAS - EF(MOD-SP4): 34.5 %
BH CV ECHO MEAS - EF(TEICH): 29.3 %
BH CV ECHO MEAS - ESV(CUBED): 78.3 ML
BH CV ECHO MEAS - ESV(MOD-SP2): 96 ML
BH CV ECHO MEAS - ESV(MOD-SP4): 112 ML
BH CV ECHO MEAS - ESV(TEICH): 82.1 ML
BH CV ECHO MEAS - FS: 13.8 %
BH CV ECHO MEAS - IVS/LVPW: 0.99
BH CV ECHO MEAS - IVSD: 1.2 CM
BH CV ECHO MEAS - LA DIMENSION: 4.5 CM
BH CV ECHO MEAS - LA/AO: 1.5
BH CV ECHO MEAS - LAD MAJOR: 5.1 CM
BH CV ECHO MEAS - LAT PEAK E' VEL: 6.9 CM/SEC
BH CV ECHO MEAS - LATERAL E/E' RATIO: 9.2
BH CV ECHO MEAS - LV DIASTOLIC VOL/BSA (35-75): 75 ML/M^2
BH CV ECHO MEAS - LV MASS(C)D: 220.1 GRAMS
BH CV ECHO MEAS - LV MASS(C)DI: 96.6 GRAMS/M^2
BH CV ECHO MEAS - LV SYSTOLIC VOL/BSA (12-30): 49.1 ML/M^2
BH CV ECHO MEAS - LVIDD: 5 CM
BH CV ECHO MEAS - LVIDS: 4.3 CM
BH CV ECHO MEAS - LVLD AP2: 8.7 CM
BH CV ECHO MEAS - LVLD AP4: 8.6 CM
BH CV ECHO MEAS - LVLS AP2: 7.8 CM
BH CV ECHO MEAS - LVLS AP4: 7.7 CM
BH CV ECHO MEAS - LVPWD: 1.2 CM
BH CV ECHO MEAS - MED PEAK E' VEL: 5.8 CM/SEC
BH CV ECHO MEAS - MEDIAL E/E' RATIO: 10.9
BH CV ECHO MEAS - MV A MAX VEL: 65.2 CM/SEC
BH CV ECHO MEAS - MV DEC TIME: 0.18 SEC
BH CV ECHO MEAS - MV E MAX VEL: 65.2 CM/SEC
BH CV ECHO MEAS - MV E/A: 1
BH CV ECHO MEAS - PA ACC SLOPE: 677.5 CM/SEC^2
BH CV ECHO MEAS - PA ACC TIME: 0.13 SEC
BH CV ECHO MEAS - PA PR(ACCEL): 22 MMHG
BH CV ECHO MEAS - RAP SYSTOLE: 3 MMHG
BH CV ECHO MEAS - RVSP: 15 MMHG
BH CV ECHO MEAS - SI(CUBED): 19.2 ML/M^2
BH CV ECHO MEAS - SI(MOD-SP2): 22.8 ML/M^2
BH CV ECHO MEAS - SI(MOD-SP4): 25.9 ML/M^2
BH CV ECHO MEAS - SI(TEICH): 14.9 ML/M^2
BH CV ECHO MEAS - SV(CUBED): 43.8 ML
BH CV ECHO MEAS - SV(MOD-SP2): 52 ML
BH CV ECHO MEAS - SV(MOD-SP4): 59 ML
BH CV ECHO MEAS - SV(TEICH): 34 ML
BH CV ECHO MEAS - TR MAX PG: 12 MMHG
BH CV ECHO MEAS - TR MAX VEL: 174.8 CM/SEC
BH CV ECHO MEASUREMENTS AVERAGE E/E' RATIO: 10.27
BH CV VAS BP RIGHT ARM: NORMAL MMHG
BH CV XLRA - RV BASE: 4.2 CM
BH CV XLRA - RV LENGTH: 8.5 CM
BH CV XLRA - RV MID: 3.4 CM
LEFT ATRIUM VOLUME INDEX: 33.4 ML/M^2
LEFT ATRIUM VOLUME: 76 ML
LV EF 2D ECHO EST: 35 %
MAXIMAL PREDICTED HEART RATE: 159 BPM
STRESS TARGET HR: 135 BPM

## 2020-03-06 ENCOUNTER — TELEPHONE (OUTPATIENT)
Dept: CARDIOLOGY | Facility: CLINIC | Age: 60
End: 2020-03-06

## 2020-03-06 NOTE — TELEPHONE ENCOUNTER
Spoke patient regarding most recent echo and it has improved to 35% from previous echo. Pt verbalized understanding

## 2020-04-16 ENCOUNTER — CLINICAL SUPPORT NO REQUIREMENTS (OUTPATIENT)
Dept: CARDIOLOGY | Facility: CLINIC | Age: 60
End: 2020-04-16

## 2020-04-16 DIAGNOSIS — I42.8 NON-ISCHEMIC CARDIOMYOPATHY (HCC): ICD-10-CM

## 2020-04-16 PROCEDURE — 93296 REM INTERROG EVL PM/IDS: CPT | Performed by: INTERNAL MEDICINE

## 2020-04-16 PROCEDURE — 93295 DEV INTERROG REMOTE 1/2/MLT: CPT | Performed by: INTERNAL MEDICINE

## 2020-04-30 ENCOUNTER — HOSPITAL ENCOUNTER (OUTPATIENT)
Age: 60
End: 2020-04-30
Payer: COMMERCIAL

## 2020-04-30 DIAGNOSIS — I50.20: Primary | ICD-10-CM

## 2020-04-30 LAB
ALBUMIN LEVEL: 4.5 G/DL (ref 3.5–5)
ALBUMIN/GLOB SERPL: 1.3 {RATIO} (ref 1.1–1.8)
ALP ISO SERPL-ACNC: 106 U/L (ref 38–126)
ALT SERPLBLD-CCNC: 72 U/L (ref 12–78)
ANION GAP SERPL CALC-SCNC: 14.3 MEQ/L (ref 5–15)
AST SERPL QL: 68 U/L (ref 17–59)
BILIRUBIN,TOTAL: 0.6 MG/DL (ref 0.2–1.3)
BUN SERPL-MCNC: 13 MG/DL (ref 9–20)
CALCIUM SPEC-MCNC: 9 MG/DL (ref 8.4–10.2)
CHLORIDE SPEC-SCNC: 101 MMOL/L (ref 98–107)
CO2 SERPL-SCNC: 28 MMOL/L (ref 22–30)
CREAT BLD-SCNC: 0.8 MG/DL (ref 0.66–1.25)
ESTIMATED GLOMERULAR FILT RATE: 98 ML/MIN (ref 60–?)
GFR (AFRICAN AMERICAN): 119 ML/MIN (ref 60–?)
GLOBULIN SER CALC-MCNC: 3.5 G/DL (ref 1.3–3.2)
GLUCOSE: 161 MG/DL (ref 74–100)
HCT VFR BLD CALC: 42.8 % (ref 42–52)
HGB BLD-MCNC: 13.8 G/DL (ref 14.1–18)
MCHC RBC-ENTMCNC: 32.1 G/DL (ref 31.8–35.4)
MCV RBC: 88.6 FL (ref 80–94)
MEAN CORPUSCULAR HEMOGLOBIN: 28.5 PG (ref 27–31.2)
NT PRO BRAIN NATRIURETIC PEP.: 23.9 PG/ML (ref 0–125)
PLATELET # BLD: 267 K/MM3 (ref 142–424)
POTASSIUM: 4.3 MMOL/L (ref 3.5–5.1)
PROT SERPL-MCNC: 8 G/DL (ref 6.3–8.2)
RBC # BLD AUTO: 4.84 M/MM3 (ref 4.6–6.2)
SODIUM SPEC-SCNC: 139 MMOL/L (ref 136–145)
TROPONIN I: < 0.01 NG/ML (ref 0–0.03)
WBC # BLD AUTO: 10 K/MM3 (ref 4.8–10.8)

## 2020-04-30 PROCEDURE — 36415 COLL VENOUS BLD VENIPUNCTURE: CPT

## 2020-04-30 PROCEDURE — 85025 COMPLETE CBC W/AUTO DIFF WBC: CPT

## 2020-04-30 PROCEDURE — 84484 ASSAY OF TROPONIN QUANT: CPT

## 2020-04-30 PROCEDURE — 83880 ASSAY OF NATRIURETIC PEPTIDE: CPT

## 2020-04-30 PROCEDURE — 80053 COMPREHEN METABOLIC PANEL: CPT

## 2020-05-27 RX ORDER — LISINOPRIL 10 MG/1
TABLET ORAL
Qty: 90 TABLET | Refills: 2 | Status: SHIPPED | OUTPATIENT
Start: 2020-05-27 | End: 2020-10-07 | Stop reason: ALTCHOICE

## 2020-07-16 RX ORDER — CARVEDILOL 12.5 MG/1
12.5 TABLET ORAL DAILY
Qty: 90 TABLET | Refills: 3 | Status: SHIPPED | OUTPATIENT
Start: 2020-07-16 | End: 2021-07-30

## 2020-08-18 ENCOUNTER — HOSPITAL ENCOUNTER (OUTPATIENT)
Age: 60
End: 2020-08-18
Payer: COMMERCIAL

## 2020-08-18 DIAGNOSIS — R20.9: ICD-10-CM

## 2020-08-18 DIAGNOSIS — M79.662: Primary | ICD-10-CM

## 2020-08-18 PROCEDURE — 93971 EXTREMITY STUDY: CPT

## 2020-08-21 ENCOUNTER — HOSPITAL ENCOUNTER (OUTPATIENT)
Age: 60
End: 2020-08-21
Payer: COMMERCIAL

## 2020-08-21 DIAGNOSIS — R20.9: Primary | ICD-10-CM

## 2020-08-21 PROCEDURE — 93923 UPR/LXTR ART STDY 3+ LVLS: CPT

## 2020-09-22 ENCOUNTER — HOSPITAL ENCOUNTER (EMERGENCY)
Dept: HOSPITAL 22 - ER | Age: 60
Discharge: HOME | End: 2020-09-22
Payer: COMMERCIAL

## 2020-09-22 VITALS
TEMPERATURE: 97.88 F | HEART RATE: 91 BPM | RESPIRATION RATE: 19 BRPM | OXYGEN SATURATION: 95 % | DIASTOLIC BLOOD PRESSURE: 74 MMHG | SYSTOLIC BLOOD PRESSURE: 144 MMHG

## 2020-09-22 VITALS
SYSTOLIC BLOOD PRESSURE: 144 MMHG | OXYGEN SATURATION: 95 % | TEMPERATURE: 97.8 F | RESPIRATION RATE: 19 BRPM | DIASTOLIC BLOOD PRESSURE: 74 MMHG | HEART RATE: 91 BPM

## 2020-09-22 VITALS
DIASTOLIC BLOOD PRESSURE: 74 MMHG | SYSTOLIC BLOOD PRESSURE: 144 MMHG | HEART RATE: 91 BPM | RESPIRATION RATE: 19 BRPM | TEMPERATURE: 97.8 F | OXYGEN SATURATION: 95 %

## 2020-09-22 VITALS — BODY MASS INDEX: 37 KG/M2 | BODY MASS INDEX: 36.9 KG/M2

## 2020-09-22 DIAGNOSIS — Z95.0: ICD-10-CM
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
## 2020-09-22 DIAGNOSIS — M25.512: Primary | ICD-10-CM

## 2020-09-22 DIAGNOSIS — I10: ICD-10-CM
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
## 2020-09-22 PROCEDURE — 73030 X-RAY EXAM OF SHOULDER: CPT

## 2020-09-22 PROCEDURE — 99201: CPT

## 2020-10-07 ENCOUNTER — OFFICE VISIT (OUTPATIENT)
Dept: CARDIOLOGY | Facility: CLINIC | Age: 60
End: 2020-10-07

## 2020-10-07 ENCOUNTER — LAB (OUTPATIENT)
Dept: LAB | Facility: HOSPITAL | Age: 60
End: 2020-10-07

## 2020-10-07 VITALS
HEIGHT: 69 IN | WEIGHT: 250 LBS | HEART RATE: 74 BPM | SYSTOLIC BLOOD PRESSURE: 102 MMHG | DIASTOLIC BLOOD PRESSURE: 64 MMHG | OXYGEN SATURATION: 94 % | BODY MASS INDEX: 37.03 KG/M2

## 2020-10-07 DIAGNOSIS — I48.0 PAROXYSMAL A-FIB (HCC): ICD-10-CM

## 2020-10-07 DIAGNOSIS — I42.8 NON-ISCHEMIC CARDIOMYOPATHY (HCC): ICD-10-CM

## 2020-10-07 DIAGNOSIS — I42.8 NON-ISCHEMIC CARDIOMYOPATHY (HCC): Primary | ICD-10-CM

## 2020-10-07 DIAGNOSIS — I10 ESSENTIAL HYPERTENSION: ICD-10-CM

## 2020-10-07 LAB
ANION GAP SERPL CALCULATED.3IONS-SCNC: 13.9 MMOL/L (ref 5–15)
BUN SERPL-MCNC: 19 MG/DL (ref 8–23)
BUN/CREAT SERPL: 17.4 (ref 7–25)
CALCIUM SPEC-SCNC: 9.4 MG/DL (ref 8.6–10.5)
CHLORIDE SERPL-SCNC: 98 MMOL/L (ref 98–107)
CO2 SERPL-SCNC: 28.1 MMOL/L (ref 22–29)
CREAT SERPL-MCNC: 1.09 MG/DL (ref 0.76–1.27)
GFR SERPL CREATININE-BSD FRML MDRD: 69 ML/MIN/1.73
GLUCOSE SERPL-MCNC: 80 MG/DL (ref 65–99)
POTASSIUM SERPL-SCNC: 4.3 MMOL/L (ref 3.5–5.2)
SODIUM SERPL-SCNC: 140 MMOL/L (ref 136–145)

## 2020-10-07 PROCEDURE — 80048 BASIC METABOLIC PNL TOTAL CA: CPT

## 2020-10-07 PROCEDURE — 93282 PRGRMG EVAL IMPLANTABLE DFB: CPT | Performed by: PHYSICIAN ASSISTANT

## 2020-10-07 PROCEDURE — 99213 OFFICE O/P EST LOW 20 MIN: CPT | Performed by: PHYSICIAN ASSISTANT

## 2020-10-07 PROCEDURE — 36415 COLL VENOUS BLD VENIPUNCTURE: CPT

## 2020-10-07 RX ORDER — SACUBITRIL AND VALSARTAN 24; 26 MG/1; MG/1
1 TABLET, FILM COATED ORAL 2 TIMES DAILY
Qty: 60 TABLET | Refills: 6 | Status: SHIPPED | OUTPATIENT
Start: 2020-10-07 | End: 2021-05-07 | Stop reason: SDUPTHER

## 2020-10-07 NOTE — PROGRESS NOTES
Abdirahman DAVID Vinay  1960  [phone]    10/07/2020    [facility] CARDIOLOGY     Dallas Crespo MD  [address] [facility]  [place] [address]    Chief Complaint   Patient presents with   • Congestive Heart Failure   • Shortness of Breath       Problem List:   1. Nonischemic Cardiomyopathy  1. CHF acute EF per [facility] reportedly <30%  2. 12/16 bnp 480  3. 2008 [facility] CBH wnl EF 60  4. 2/17 [facility] nl cors EF 20%  5. 6/27/17 SJM ICD implant  6. ER 2/2019 NSR/ST, PAC  7. Echo 2/2019 LVEF 35-40%  8. Echo 43/4/2020: 35%  2. DM -A1c11- 8.3 2018  Onset 2011  3. HTN  4. HL-on statin  1. 2016 total chol 104  5. TAMAR- cpap intolerant  6. Palpitations/PAF:  1. 12/16 holter <1%pvc, <1% pac   2. Noted short episodes on device interrogation   7. Chronically elevated cpk 4-500    Allergies  Allergies   Allergen Reactions   • Other GI Intolerance     All pain medications cause constipation       Current Medications    Current Outpatient Medications:   •  ALLOPURINOL PO, Take 100 mg by mouth Daily., Disp: , Rfl:   •  aspirin 81 MG chewable tablet, Chew 81 mg Daily., Disp: , Rfl:   •  carvedilol (COREG) 12.5 MG tablet, Take 1 tablet by mouth Daily., Disp: 90 tablet, Rfl: 3  •  Insulin Degludec (TRESIBA FLEXTOUCH SC), Inject 60 Units under the skin into the appropriate area as directed Every Night., Disp: , Rfl:   •  lisinopril (PRINIVIL,ZESTRIL) 10 MG tablet, TAKE ONE TABLET BY MOUTH EVERY DAY, Disp: 90 tablet, Rfl: 2  •  pantoprazole (PROTONIX) 40 MG EC tablet, Take 40 mg by mouth Daily., Disp: , Rfl:   •  Rosuvastatin Calcium (CRESTOR PO), Take 10 mg by mouth Daily., Disp: , Rfl:   •  SITagliptin-MetFORMIN HCl ER (JANUMET XR) 100-1000 MG tablet sustained-release 24 hour, Take 2 tablets by mouth Daily., Disp: , Rfl:   •  tamsulosin (FLOMAX) 0.4 MG capsule 24 hr capsule, Take 1 capsule by mouth Every Night., Disp: , Rfl:   •  torsemide (DEMADEX) 20 MG tablet, TAKE 1/2 TABLET BY MOUTH EVERY DAY, Disp: 45  "tablet, Rfl: 6    History of Present Illness     Pt presents for follow up of CHF, NICM, ICD check, and PAF. Since we last saw the pt, pt denies any palpitations. He has some SOB when walking long distances or walking uphill. He denies CP, LH, and dizziness, syncope.  Denies any hospitalizations, ER visits, bleeding, or TIA/CVA symptoms. Overall feels well. BP runs in the low 100s.     ROS:  General:  + fatigue, - weight gain or loss  Cardiovascular:  Denies CP, PND, syncope, near syncope, + edema - palpitations.  Pulmonary:  Denies MIRAMONTES, cough, or wheezing      Vitals:    10/07/20 1548   BP: 102/64   BP Location: Right arm   Patient Position: Sitting   Pulse: 74   SpO2: 94%   Weight: 113 kg (250 lb)   Height: 175.3 cm (69\")     Body mass index is 36.92 kg/m².  PE:  General: NAD. A & O x 3   Neck: no JVD, no carotid bruits, no TM  Heart RRR, NL S1, S2, S4 present, no rubs, murmurs  Lungs: CTA, no wheezes, rhonchi, or rales  Abd: soft, non-tender, NL BS  Ext: No musculoskeletal deformities, no edema, cyanosis, or clubbing  Psych: normal mood and affect    Diagnostic Data:    ICD Manual Interrogation: RV paced less than 1%, no VT. Short episodes of PAF, lasting longest 26 seconds. 6.2-6.5 years on battery.     Procedures    1. Non-ischemic cardiomyopathy (CMS/HCC)    2. Paroxysmal A-fib (CMS/HCC)    3. Essential hypertension          Plan:    1. NICM: on appropriate meds: no changes for now  - EF 20% in 2017, now LVEF 35%   - ICD function normal  - Class II symptoms. Will stop Lisinopril today, obtain BMP, start Entresto 24/26 mg BID. Need BMP in 5 days after starting it.      2. PAF:   - minimal episodes, longest 26 seconds.      3. HTN: BP well controlled    F/up in 6 months    Electronically signed by SHASTA Wolf, 10/07/20, 4:22 PM EDT.    "

## 2020-10-08 ENCOUNTER — TELEPHONE (OUTPATIENT)
Dept: CARDIOLOGY | Facility: CLINIC | Age: 60
End: 2020-10-08

## 2020-10-08 NOTE — TELEPHONE ENCOUNTER
----- Message from SHASTA Hills sent at 10/8/2020  7:17 AM EDT -----  Renetta,   Will you call this patient today and let him know that his labs looked good from yesterday. He should start his Entresto tomorrow and get another BMP 5 days after starting it. I think Jaymie faxed the order to [facility] yesterday.     Thanks!   Kathy

## 2020-10-14 ENCOUNTER — HOSPITAL ENCOUNTER (OUTPATIENT)
Age: 60
End: 2020-10-14
Payer: COMMERCIAL

## 2020-10-14 ENCOUNTER — TELEPHONE (OUTPATIENT)
Dept: CARDIOLOGY | Facility: CLINIC | Age: 60
End: 2020-10-14

## 2020-10-14 DIAGNOSIS — I42.8: Primary | ICD-10-CM

## 2020-10-14 LAB
ANION GAP SERPL CALC-SCNC: 16.8 MEQ/L (ref 5–15)
BUN SERPL-MCNC: 20 MG/DL (ref 9–20)
CALCIUM SPEC-MCNC: 9.6 MG/DL (ref 8.4–10.2)
CHLORIDE SPEC-SCNC: 99 MMOL/L (ref 98–107)
CO2 SERPL-SCNC: 28 MMOL/L (ref 22–30)
CREAT BLD-SCNC: 0.9 MG/DL (ref 0.66–1.25)
ESTIMATED GLOMERULAR FILT RATE: 86 ML/MIN (ref 60–?)
GFR (AFRICAN AMERICAN): 103 ML/MIN (ref 60–?)
GLUCOSE: 110 MG/DL (ref 74–100)
POTASSIUM: 4.8 MMOL/L (ref 3.5–5.1)
SODIUM SPEC-SCNC: 139 MMOL/L (ref 136–145)

## 2020-10-14 PROCEDURE — 36415 COLL VENOUS BLD VENIPUNCTURE: CPT

## 2020-10-14 PROCEDURE — 80048 BASIC METABOLIC PNL TOTAL CA: CPT

## 2020-10-19 NOTE — TELEPHONE ENCOUNTER
Kathy Jenkins PA Nolan, Katie H, RN             Creatine reviewed and is WNL on Entresto      Patient notified.

## 2020-10-21 ENCOUNTER — OFFICE VISIT (OUTPATIENT)
Dept: ORTHOPEDIC SURGERY | Facility: CLINIC | Age: 60
End: 2020-10-21

## 2020-10-21 VITALS — BODY MASS INDEX: 36.9 KG/M2 | WEIGHT: 249.12 LBS | HEIGHT: 69 IN | HEART RATE: 68 BPM | OXYGEN SATURATION: 97 %

## 2020-10-21 DIAGNOSIS — M12.812 ROTATOR CUFF ARTHROPATHY OF LEFT SHOULDER: Primary | ICD-10-CM

## 2020-10-21 PROCEDURE — 20610 DRAIN/INJ JOINT/BURSA W/O US: CPT | Performed by: ORTHOPAEDIC SURGERY

## 2020-10-21 PROCEDURE — 99203 OFFICE O/P NEW LOW 30 MIN: CPT | Performed by: ORTHOPAEDIC SURGERY

## 2020-10-21 RX ORDER — EMPAGLIFLOZIN 10 MG/1
TABLET, FILM COATED ORAL
COMMUNITY
End: 2020-11-04 | Stop reason: SDUPTHER

## 2020-10-21 RX ORDER — SPIRONOLACTONE 50 MG/1
50 TABLET, FILM COATED ORAL DAILY
COMMUNITY
End: 2022-02-10 | Stop reason: SDUPTHER

## 2020-10-21 RX ORDER — TRIAMCINOLONE ACETONIDE 40 MG/ML
40 INJECTION, SUSPENSION INTRA-ARTICULAR; INTRAMUSCULAR
Status: COMPLETED | OUTPATIENT
Start: 2020-10-21 | End: 2020-10-21

## 2020-10-21 RX ORDER — CETIRIZINE HYDROCHLORIDE 10 MG/1
10 TABLET ORAL DAILY
COMMUNITY

## 2020-10-21 RX ORDER — MONTELUKAST SODIUM 10 MG/1
10 TABLET ORAL NIGHTLY
COMMUNITY

## 2020-10-21 RX ORDER — ROPIVACAINE HYDROCHLORIDE 5 MG/ML
4 INJECTION, SOLUTION EPIDURAL; INFILTRATION; PERINEURAL
Status: COMPLETED | OUTPATIENT
Start: 2020-10-21 | End: 2020-10-21

## 2020-10-21 RX ADMIN — TRIAMCINOLONE ACETONIDE 40 MG: 40 INJECTION, SUSPENSION INTRA-ARTICULAR; INTRAMUSCULAR at 10:44

## 2020-10-21 RX ADMIN — ROPIVACAINE HYDROCHLORIDE 4 ML: 5 INJECTION, SOLUTION EPIDURAL; INFILTRATION; PERINEURAL at 10:44

## 2020-10-21 NOTE — PROGRESS NOTES
Procedure   Large Joint Arthrocentesis: L glenohumeral  Date/Time: 10/21/2020 10:44 AM  Consent given by: patient  Site marked: site marked  Timeout: Immediately prior to procedure a time out was called to verify the correct patient, procedure, equipment, support staff and site/side marked as required   Supporting Documentation  Indications: pain   Procedure Details  Location: shoulder - L glenohumeral  Preparation: Patient was prepped and draped in the usual sterile fashion  Needle size: 22 G  Approach: anterior  Medications administered: 40 mg triamcinolone acetonide 40 MG/ML; 4 mL ropivacaine 0.5 %  Patient tolerance: patient tolerated the procedure well with no immediate complications

## 2020-10-21 NOTE — PROGRESS NOTES
[facility] [place] [facility] Note    Subjective     Chief Complaint   Patient presents with   • Left Shoulder - Pain        HPI    Abdirahman Mclean is a 60 y.o. male who presents with left shoulder pain.  Onset: twisting injury. The issue has been ongoing for 2 week(s). Pain is a 5/10 on the pain scale. Pain is described as throbbing. Associated symptoms include pain. The pain is worse with sitting; resting improve the pain. Previous treatments have included: nothing.  Prior to 2 weeks ago, his shoulder really did not bother him.    I have reviewed the following portions of the patient's history and agree with: History of Present Illness and Review of Systems    Patient Active Problem List   Diagnosis   • Non-cardiac chest pain   • Essential hypertension   • Hyperlipidemia LDL goal <100   • Obesity   • GERD (gastroesophageal reflux disease)   • Colon cancer (CMS/HCC)   • Non-ischemic cardiomyopathy (CMS/HCC)   • Type 2 diabetes mellitus without complication (CMS/HCC)   • Systolic congestive heart failure, NYHA class 3 (CMS/HCC)   • ICD (implantable cardioverter-defibrillator) in place   • Paroxysmal A-fib (CMS/HCC)     Past Medical History:   Diagnosis Date   • Acid reflux    • Anxiety    • Colon cancer (CMS/HCC)    • Colon cancer (CMS/HCC)     S/P RESECTION    • Coronary syndrome, acute (CMS/HCC)    • Diabetes (CMS/HCC)     DX 2013, NO FSBS   • GERD (gastroesophageal reflux disease)    • Gout    • Hyperlipidemia    • Hypertension    • Obesity    • On home O2     at bedtime   • TAMAR (obstructive sleep apnea)     NO CPAP USE   • Osteoarthritis    • Oxygen dependent     O2 2L NC EVERY NIGHT   • Urinary frequency    • Wears glasses       Past Surgical History:   Procedure Laterality Date   • APPENDECTOMY     • BACK SURGERY      3 times 05/13/08 states x2   • CARDIAC CATHETERIZATION N/A 2/13/2017    Procedure: Left Heart Cath;  Surgeon: Alhaji Robertson MD;  Location: [facility] INVASIVE LOCATION;   Service:    • CARDIAC ELECTROPHYSIOLOGY PROCEDURE N/A 6/27/2017    Procedure: VVI ICD Implant vs S-ICD BSC;  Surgeon: David Garcia MD;  Location: [facility] INVASIVE LOCATION;  Service:    • CARPAL TUNNEL RELEASE Left    • CHOLECYSTECTOMY     • COLON RESECTION     • COLONOSCOPY     • TONSILLECTOMY        Family History   Problem Relation Age of Onset   • Hypertension Mother    • No Known Problems Father    • Hypertension Brother    • Hypertension Brother      Social History     Socioeconomic History   • Marital status:      Spouse name: Not on file   • Number of children: Not on file   • Years of education: Not on file   • Highest education level: Not on file   Tobacco Use   • Smoking status: Never Smoker   • Smokeless tobacco: Never Used   Substance and Sexual Activity   • Alcohol use: No   • Drug use: No   • Sexual activity: Defer      Current Outpatient Medications on File Prior to Visit   Medication Sig Dispense Refill   • ALLOPURINOL PO Take 100 mg by mouth Daily.     • aspirin 81 MG chewable tablet Chew 81 mg Daily.     • carvedilol (COREG) 12.5 MG tablet Take 1 tablet by mouth Daily. 90 tablet 3   • cetirizine (zyrTEC) 10 MG tablet Take 10 mg by mouth Daily.     • Empagliflozin (Jardiance) 10 MG tablet Take  by mouth.     • Insulin Degludec (TRESIBA FLEXTOUCH SC) Inject 60 Units under the skin into the appropriate area as directed Every Night.     • montelukast (SINGULAIR) 10 MG tablet Take 10 mg by mouth Every Night.     • pantoprazole (PROTONIX) 40 MG EC tablet Take 40 mg by mouth Daily.     • Rosuvastatin Calcium (CRESTOR PO) Take 10 mg by mouth Daily.     • sacubitril-valsartan (Entresto) 24-26 MG tablet Take 1 tablet by mouth 2 (Two) Times a Day. 60 tablet 6   • SITagliptin-MetFORMIN HCl ER (JANUMET XR) 100-1000 MG tablet sustained-release 24 hour Take 2 tablets by mouth Daily.     • spironolactone (ALDACTONE) 50 MG tablet Take 50 mg by mouth Daily.     • torsemide (DEMADEX) 20 MG tablet TAKE  1/2 TABLET BY MOUTH EVERY DAY 45 tablet 6   • tamsulosin (FLOMAX) 0.4 MG capsule 24 hr capsule Take 1 capsule by mouth Every Night.       No current facility-administered medications on file prior to visit.       Allergies   Allergen Reactions   • Other GI Intolerance     All pain medications cause constipation        Review of Systems   Constitutional: Negative for activity change, appetite change, chills, diaphoresis, fatigue, fever and unexpected weight change.   HENT: Negative for congestion, dental problem, drooling, ear discharge, ear pain, facial swelling, hearing loss, mouth sores, nosebleeds, postnasal drip, rhinorrhea, sinus pressure, sneezing, sore throat, tinnitus, trouble swallowing and voice change.    Eyes: Negative for photophobia, pain, discharge, redness, itching and visual disturbance.   Respiratory: Negative for apnea, cough, choking, chest tightness, shortness of breath, wheezing and stridor.    Cardiovascular: Negative for chest pain, palpitations and leg swelling.   Gastrointestinal: Negative for abdominal distention, abdominal pain, anal bleeding, blood in stool, constipation, diarrhea, nausea, rectal pain and vomiting.   Endocrine: Negative for cold intolerance, heat intolerance, polydipsia, polyphagia and polyuria.   Genitourinary: Negative for decreased urine volume, difficulty urinating, dysuria, enuresis, flank pain, frequency, genital sores, hematuria and urgency.   Musculoskeletal: Positive for arthralgias. Negative for back pain, gait problem, joint swelling, myalgias, neck pain and neck stiffness.   Skin: Negative for color change, pallor, rash and wound.   Allergic/Immunologic: Negative for environmental allergies, food allergies and immunocompromised state.   Neurological: Negative for dizziness, tremors, seizures, syncope, facial asymmetry, speech difficulty, weakness, light-headedness, numbness and headaches.   Hematological: Negative for adenopathy. Does not bruise/bleed easily.  "  Psychiatric/Behavioral: Negative for agitation, behavioral problems, confusion, decreased concentration, dysphoric mood, hallucinations, self-injury, sleep disturbance and suicidal ideas. The patient is not nervous/anxious and is not hyperactive.         Objective      Physical Exam  Pulse 68   Ht 175.3 cm (69.02\")   Wt 113 kg (249 lb 1.9 oz)   SpO2 97%   BMI 36.77 kg/m²     Body mass index is 36.77 kg/m².    General:   Mental Status:  Alert   Appearance: Cooperative, in no acute distress   Build and Nutrition: Overweight male   Orientation: Alert and oriented to person, place and time   Posture: Normal   Gait: Normal    Integument:   Left shoulder: No skin lesions, no rash, no ecchymosis    Neurologic:   Sensation:    Left hand: Intact to light touch in the digits   Motor:  Left upper extremity: 5/5 deltoid, biceps, triceps, wrist flexors, wrist extensors, and interossei  Vascular:   Left upper extremity: 2+ radial pulse, prompt capillary refill    Upper Extremities:   Left Shoulder:    Tenderness:  None    Swelling:  None    Crepitus: None    Atrophy:  None    Range of motion:  External rotation:  20°       Forward flexion:  160°       Abduction:   150°  Instability:  None  Deformities:  None  Functional testing: Positive drop arm, negative lift-off, negative impingement, weak external rotation      Imaging/Studies      Imaging Results (Last 24 Hours)     Procedure Component Value Units Date/Time    XR Shoulder 2+ View Left [[id number]] Resulted: 10/21/20 1034     Updated: 10/21/20 1035    Narrative:      Left Shoulder Radiographs  Indication: left shoulder pain  Views: AP, outlet and axillary views of the left shoulder    Comparison: no prior studies available for review    Findings:  High riding humeral head, glenohumeral degeneration, acromioclavicular   joint degeneration, with no acute bony abnormalities.  Findings consistent   with rotator cuff arthropathy.            Assessment and Plan     Diagnoses " and all orders for this visit:    1. Rotator cuff arthropathy of left shoulder (Primary)  -     XR Shoulder 2+ View Left  -     Large Joint Arthrocentesis: L glenohumeral  -     Ambulatory Referral to Physical Therapy Evaluate and treat        1. Rotator cuff arthropathy of left shoulder        Reviewed my findings with the patient today.  He has rotator cuff arthropathy of his left shoulder.  He noticed this after an injury 2 weeks ago, but denied any antecedent pain.  I offered him an injection today, as well as therapy, and I will see him back in 2 months.  I will see him back sooner for any problems.  If he continues to have problems, I will have him see Dr. Ramirez for consideration of surgical intervention.    Procedure Note:  The potential benefits of performing a therapeutic left shoulder glenohumeral joint injection, as well as potential risks (including, but not limited to infection, swelling, pain, bleeding, bruising, nerve/blood vessel damage, skin color changes, transient elevation in blood glucose levels, and fat atrophy) were discussed with the patient.  After informed consent, timeout procedure was performed, and the skin on the left shoulder was prepped with chlorhexidine soap and alcohol, after which ethyl chloride was applied to the skin at the injection site. Via the posterior approach, 1ml of Kenalog 40mg/ml mixed with 4ml 0.5% ropivacaine plain was injected into the shoulder joint.  The patient tolerated the procedure well, experiencing 90% improvement a few minutes following the injection. There were no complications.  Band-Aid was applied to the injection site. Post-procedural instructions were given to the patient and/or their caregiver.      Return in about 2 months (around 12/21/2020).    Medical Decision Making  Management Options : prescription/IM medicine and physical/occupational therapy  Data/Risk: radiology tests and independent visualization of imaging, lab tests, or  EMG/NCV      Vazquez Montez MD  10/21/20  10:59 EDT    Dragon disclaimer:  Much of this encounter note is an electronic transcription/translation of spoken language to printed text. The electronic translation of spoken language may permit erroneous, or at times, nonsensical words or phrases to be inadvertently transcribed; Although I have reviewed the note for such errors, some may still exist.

## 2020-11-04 RX ORDER — EMPAGLIFLOZIN 10 MG/1
10 TABLET, FILM COATED ORAL DAILY
Qty: 30 TABLET | Refills: 5 | Status: SHIPPED | OUTPATIENT
Start: 2020-11-04 | End: 2021-04-28

## 2020-11-04 NOTE — TELEPHONE ENCOUNTER
PT CALLED STATING HE NEEDS [facility] [place]

## 2020-12-15 ENCOUNTER — LAB (OUTPATIENT)
Dept: LAB | Facility: HOSPITAL | Age: 60
End: 2020-12-15

## 2020-12-15 ENCOUNTER — OFFICE VISIT (OUTPATIENT)
Dept: ENDOCRINOLOGY | Facility: CLINIC | Age: 60
End: 2020-12-15

## 2020-12-15 VITALS
WEIGHT: 251.8 LBS | TEMPERATURE: 97.3 F | HEART RATE: 74 BPM | BODY MASS INDEX: 37.29 KG/M2 | DIASTOLIC BLOOD PRESSURE: 80 MMHG | SYSTOLIC BLOOD PRESSURE: 124 MMHG | OXYGEN SATURATION: 96 % | HEIGHT: 69 IN

## 2020-12-15 DIAGNOSIS — E11.65 UNCONTROLLED TYPE 2 DIABETES MELLITUS WITH HYPERGLYCEMIA (HCC): ICD-10-CM

## 2020-12-15 DIAGNOSIS — E11.65 UNCONTROLLED TYPE 2 DIABETES MELLITUS WITH HYPERGLYCEMIA (HCC): Primary | ICD-10-CM

## 2020-12-15 DIAGNOSIS — I10 ESSENTIAL HYPERTENSION: ICD-10-CM

## 2020-12-15 DIAGNOSIS — I42.8 NON-ISCHEMIC CARDIOMYOPATHY (HCC): ICD-10-CM

## 2020-12-15 DIAGNOSIS — E78.5 HYPERLIPIDEMIA LDL GOAL <100: ICD-10-CM

## 2020-12-15 DIAGNOSIS — Z79.4 INSULIN LONG-TERM USE (HCC): ICD-10-CM

## 2020-12-15 LAB
ALBUMIN SERPL-MCNC: 4.6 G/DL (ref 3.5–5.2)
ALBUMIN UR-MCNC: 1.3 MG/DL
ALBUMIN/GLOB SERPL: 1.3 G/DL
ALP SERPL-CCNC: 123 U/L (ref 39–117)
ALT SERPL W P-5'-P-CCNC: 27 U/L (ref 1–41)
ANION GAP SERPL CALCULATED.3IONS-SCNC: 14.8 MMOL/L (ref 5–15)
AST SERPL-CCNC: 23 U/L (ref 1–40)
BILIRUB SERPL-MCNC: 0.6 MG/DL (ref 0–1.2)
BUN SERPL-MCNC: 15 MG/DL (ref 8–23)
BUN/CREAT SERPL: 17.2 (ref 7–25)
CALCIUM SPEC-SCNC: 9.3 MG/DL (ref 8.6–10.5)
CHLORIDE SERPL-SCNC: 99 MMOL/L (ref 98–107)
CHOLEST SERPL-MCNC: 117 MG/DL (ref 0–200)
CO2 SERPL-SCNC: 25.2 MMOL/L (ref 22–29)
CREAT SERPL-MCNC: 0.87 MG/DL (ref 0.76–1.27)
CREAT UR-MCNC: 58.6 MG/DL
EXPIRATION DATE: NORMAL
GFR SERPL CREATININE-BSD FRML MDRD: 89 ML/MIN/1.73
GLOBULIN UR ELPH-MCNC: 3.5 GM/DL
GLUCOSE SERPL-MCNC: 121 MG/DL (ref 65–99)
HBA1C MFR BLD: 6.5 %
HDLC SERPL-MCNC: 34 MG/DL (ref 40–60)
LDLC SERPL CALC-MCNC: 46 MG/DL (ref 0–100)
LDLC/HDLC SERPL: 1.07 {RATIO}
Lab: NORMAL
MICROALBUMIN/CREAT UR: 22.2 MG/G
POTASSIUM SERPL-SCNC: 4.4 MMOL/L (ref 3.5–5.2)
PROT SERPL-MCNC: 8.1 G/DL (ref 6–8.5)
SODIUM SERPL-SCNC: 139 MMOL/L (ref 136–145)
TRIGL SERPL-MCNC: 233 MG/DL (ref 0–150)
TSH SERPL DL<=0.05 MIU/L-ACNC: 1.69 UIU/ML (ref 0.27–4.2)
VLDLC SERPL-MCNC: 37 MG/DL (ref 5–40)

## 2020-12-15 PROCEDURE — 80061 LIPID PANEL: CPT

## 2020-12-15 PROCEDURE — 83036 HEMOGLOBIN GLYCOSYLATED A1C: CPT | Performed by: INTERNAL MEDICINE

## 2020-12-15 PROCEDURE — 99214 OFFICE O/P EST MOD 30 MIN: CPT | Performed by: INTERNAL MEDICINE

## 2020-12-15 PROCEDURE — 82043 UR ALBUMIN QUANTITATIVE: CPT

## 2020-12-15 PROCEDURE — 82570 ASSAY OF URINE CREATININE: CPT

## 2020-12-15 PROCEDURE — 80053 COMPREHEN METABOLIC PANEL: CPT

## 2020-12-15 PROCEDURE — 84443 ASSAY THYROID STIM HORMONE: CPT

## 2020-12-15 RX ORDER — LANCETS 33 GAUGE
EACH MISCELLANEOUS
COMMUNITY
Start: 2020-11-25 | End: 2021-09-29

## 2020-12-15 RX ORDER — INSULIN DEGLUDEC INJECTION 100 U/ML
INJECTION, SOLUTION SUBCUTANEOUS
COMMUNITY
Start: 2020-11-25 | End: 2021-03-15

## 2020-12-15 RX ORDER — ALLOPURINOL 100 MG/1
100 TABLET ORAL DAILY
COMMUNITY
Start: 2020-09-28

## 2020-12-15 RX ORDER — SITAGLIPTIN AND METFORMIN HYDROCHLORIDE 1000; 50 MG/1; MG/1
TABLET, FILM COATED, EXTENDED RELEASE ORAL
COMMUNITY
Start: 2020-11-25 | End: 2020-12-31 | Stop reason: SDUPTHER

## 2020-12-15 RX ORDER — ALPRAZOLAM 1 MG/1
TABLET ORAL 2 TIMES DAILY
COMMUNITY
Start: 2020-10-28 | End: 2023-01-12 | Stop reason: SDUPTHER

## 2020-12-15 NOTE — ASSESSMENT & PLAN NOTE
Diabetes is improving with treatment.   Continue current treatment regimen.  Diabetes will be reassessed in 3 months.    A1c looks good!

## 2020-12-15 NOTE — PROGRESS NOTES
"     Office Note      Date: 12/15/2020  Patient Name: Abdirahman Mclean  MRN: [id number]  : 1960    Chief Complaint   Patient presents with   • Diabetes       History of Present Illness:   Abdirahman Mclean is a 60 y.o. male who presents for Diabetes type 2. Diagnosed in: . Treated in past with oral agents. Current treatments: janumet, jardiance and basal insulin. Number of insulin shots per day: 1. Checks blood sugar 2 times a day. Has low blood sugar: no. Aspirin use: Yes. Statin use: Yes. ACE-I/ARB use: Yes. Changes in health since last visit: none. Last eye exam .    Subjective      Diabetic Complications:  Eyes: No  Kidneys: No  Feet: No  Heart: Yes - cardiomyopathy    Diet and Exercise:  Meals per day: 2  Minutes of exercise per week: 70 mins.    Review of Systems:   Review of Systems   Constitutional: Negative.    Respiratory: Positive for shortness of breath.    Cardiovascular: Positive for leg swelling.   Gastrointestinal: Negative.    Endocrine: Negative.        The following portions of the patient's history were reviewed and updated as appropriate: allergies, current medications, past family history, past medical history, past social history, past surgical history and problem list.    Objective       Visit Vitals  /80   Pulse 74   Temp 97.3 °F (36.3 °C) (Infrared)   Ht 175.3 cm (69\")   Wt 114 kg (251 lb 12.8 oz)   SpO2 96%   BMI 37.18 kg/m²       Physical Exam:  Physical Exam  Constitutional:       Appearance: Normal appearance.   Cardiovascular:      Pulses:           Dorsalis pedis pulses are 2+ on the right side and 2+ on the left side.        Posterior tibial pulses are 2+ on the right side and 2+ on the left side.   Feet:      Right foot:      Protective Sensation: 5 sites tested. 5 sites sensed.      Skin integrity: Callus present.      Toenail Condition: Right toenails are abnormally thick.      Left foot:      Protective Sensation: 5 sites tested. 5 sites sensed.      Skin " integrity: Callus present.      Toenail Condition: Left toenails are abnormally thick.      Comments: Callus at 1st MP joint bilaterally  Neurological:      Mental Status: He is alert.         Labs:    HbA1c  Lab Results   Component Value Date    HGBA1C 6.5 12/15/2020       CMP  Lab Results   Component Value Date    GLUCOSE 80 10/07/2020    BUN 19 10/07/2020    CREATININE 1.09 10/07/2020    EGFRIFNONA 69 10/07/2020    BCR 17.4 10/07/2020    K 4.3 10/07/2020    CO2 28.1 10/07/2020    CALCIUM 9.4 10/07/2020    AST 22 02/13/2017    ALT 31 02/13/2017        Lipid Panel  Lab Results   Component Value Date    HDL 41 02/13/2017     (H) 02/13/2017    TRIG 250 (H) 02/13/2017        TSH  No results found for: TSH, FREET4     Hemoglobin A1C  Lab Results   Component Value Date    HGBA1C 6.5 12/15/2020        Microalbumin/Creatinine  No results found for: MALBCRERATIO, CREATINIURIN, MICROALBUR        Assessment / Plan      Assessment & Plan:  Problem List Items Addressed This Visit        Cardiovascular and Mediastinum    Essential hypertension    Current Assessment & Plan     Hypertension is unchanged.  Continue current treatment regimen.  Blood pressure will be reassessed in 3 months.         Relevant Medications    torsemide (DEMADEX) 20 MG tablet    carvedilol (COREG) 12.5 MG tablet    spironolactone (ALDACTONE) 50 MG tablet    Hyperlipidemia LDL goal <100    Current Assessment & Plan     Lipids pending.  Continue statin.         Relevant Medications    Rosuvastatin Calcium (CRESTOR PO)    Non-ischemic cardiomyopathy (CMS/HCC)    Overview     · Echocardiogram at [facility] (12//2016): LVEF 15%  · Holter monitor (12/29/2016): Multiple episodes ST depression.  Ventricular ectopy 1% burden.  2 positive exceeding 2.0 seconds with longest at 3.4 seconds.  Heart rate greater than 120 at 6% of the time.  · Myocardial perfusion study (2/03/2017): Small reversible defect in inferior lateral wall consistent with  ischemia.  LVEF 20%.  · Cardiac catheterization (2/13/17): Minimal coronary artery disease.  LVEF 20%.  Normal LV filling pressure.         Current Assessment & Plan     Continue cardiology follow up.         Relevant Medications    carvedilol (COREG) 12.5 MG tablet    sacubitril-valsartan (Entresto) 24-26 MG tablet       Endocrine    Uncontrolled type 2 diabetes mellitus with hyperglycemia (CMS/HCC) - Primary    Current Assessment & Plan     Diabetes is improving with treatment.   Continue current treatment regimen.  Diabetes will be reassessed in 3 months.    A1c looks good!         Relevant Medications    Empagliflozin (Jardiance) 10 MG tablet    Tresiba FlexTouch 100 UNIT/ML solution pen-injector injection    Janumet XR  MG tablet    Other Relevant Orders    POC Glycosylated Hemoglobin (Hb A1C) (Completed)    Comprehensive Metabolic Panel    Lipid Panel    Microalbumin / Creatinine Urine Ratio - Urine, Clean Catch    TSH       Other    Insulin long-term use (CMS/HCC)           Return in about 3 months (around 3/15/2021) for Recheck with A1c.    Hugo Andrade MD   12/15/2020

## 2020-12-17 ENCOUNTER — HOSPITAL ENCOUNTER (OUTPATIENT)
Dept: HOSPITAL 22 - PT | Age: 60
Discharge: HOME | End: 2020-12-17
Payer: COMMERCIAL

## 2020-12-17 DIAGNOSIS — M12.812: Primary | ICD-10-CM

## 2020-12-17 PROCEDURE — 97163 PT EVAL HIGH COMPLEX 45 MIN: CPT

## 2020-12-17 PROCEDURE — 97110 THERAPEUTIC EXERCISES: CPT

## 2020-12-17 PROCEDURE — 97010 HOT OR COLD PACKS THERAPY: CPT

## 2020-12-17 PROCEDURE — 97035 APP MDLTY 1+ULTRASOUND EA 15: CPT

## 2020-12-17 PROCEDURE — 97016 VASOPNEUMATIC DEVICE THERAPY: CPT

## 2020-12-17 PROCEDURE — 97014 ELECTRIC STIMULATION THERAPY: CPT

## 2020-12-17 PROCEDURE — 97140 MANUAL THERAPY 1/> REGIONS: CPT

## 2020-12-17 PROCEDURE — 97164 PT RE-EVAL EST PLAN CARE: CPT

## 2020-12-17 PROCEDURE — G0283 ELEC STIM OTHER THAN WOUND: HCPCS

## 2020-12-21 ENCOUNTER — OFFICE VISIT (OUTPATIENT)
Dept: ORTHOPEDIC SURGERY | Facility: CLINIC | Age: 60
End: 2020-12-21

## 2020-12-21 VITALS — BODY MASS INDEX: 37.22 KG/M2 | OXYGEN SATURATION: 98 % | HEART RATE: 80 BPM | HEIGHT: 69 IN | WEIGHT: 251.32 LBS

## 2020-12-21 DIAGNOSIS — M12.812 ROTATOR CUFF ARTHROPATHY OF LEFT SHOULDER: Primary | ICD-10-CM

## 2020-12-21 PROCEDURE — 99212 OFFICE O/P EST SF 10 MIN: CPT | Performed by: ORTHOPAEDIC SURGERY

## 2020-12-21 NOTE — PROGRESS NOTES
[facility] Note    Subjective     Chief Complaint   Patient presents with   • Follow-up     2 months- Rotator cuff arthropathy of left shoulder         HPI    It has been 2  month(s) since Mr. Mclean's last visit. He returns to clinic today for follow-up of left shoulder pain. He rates his pain a 4/10 on the pain scale. Previous/current treatments: physical therapy. Current symptoms: same as prior visit. The pain is worse with leisure; resting improve the pain. Overall, he is doing better.  Improvement with therapy and the injection on his last visit.  No more constant pain.    I have reviewed the following portions of the patient's history and agree with: History of Present Illness and Review of Systems    Patient Active Problem List   Diagnosis   • Non-cardiac chest pain   • Essential hypertension   • Hyperlipidemia LDL goal <100   • Obesity   • GERD (gastroesophageal reflux disease)   • Colon cancer (CMS/HCC)   • Non-ischemic cardiomyopathy (CMS/HCC)   • Type 2 diabetes mellitus without complication (CMS/HCC)   • Systolic congestive heart failure, NYHA class 3 (CMS/HCC)   • ICD (implantable cardioverter-defibrillator) in place   • Paroxysmal A-fib (CMS/HCC)   • Uncontrolled type 2 diabetes mellitus with hyperglycemia (CMS/HCC)   • Insulin long-term use (CMS/HCC)     Past Medical History:   Diagnosis Date   • Acid reflux    • Anxiety    • Cardiomyopathy (CMS/HCC)    • Colon cancer (CMS/HCC)     S/P RESECTION    • Coronary syndrome, acute (CMS/HCC)    • Diabetes (CMS/HCC)     DX 2013, NO FSBS   • Diabetes mellitus type I (CMS/HCC)    • GERD (gastroesophageal reflux disease)    • Gout    • History of colon cancer    • History of heart disease    • Hyperlipidemia    • Hypertension    • Hypertensive disorder    • Long term current use of insulin (CMS/HCC)    • Mixed hyperlipidemia    • Obesity     body mass index 30+-obesity   • On home O2     at bedtime   • TAMAR (obstructive sleep apnea)      NO CPAP USE   • Osteoarthritis    • Oxygen dependent     O2 2L NC EVERY NIGHT   • Type 2 diabetes mellitus, uncontrolled (CMS/HCC)    • Urinary frequency    • Wears glasses       Past Surgical History:   Procedure Laterality Date   • APPENDECTOMY     • BACK SURGERY      3 times 05/13/08 states x2   • CARDIAC CATHETERIZATION N/A 2/13/2017    Procedure: Left Heart Cath;  Surgeon: Alhaji Robertson MD;  Location:  [facility];  Service:    • CARDIAC DEFIBRILLATOR PLACEMENT     • CARDIAC ELECTROPHYSIOLOGY PROCEDURE N/A 6/27/2017    Procedure: VVI ICD Implant vs S-ICD BSC;  Surgeon: David Garcia MD;  Location:  [facility];  Service:    • CARPAL TUNNEL RELEASE Left    • CHOLECYSTECTOMY     • COLON RESECTION     • COLONOSCOPY     • TONSILLECTOMY     • TYMPANOTOMY        Family History   Problem Relation Age of Onset   • Hypertension Mother    • No Known Problems Father    • Hypertension Brother    • Hypertension Brother      Social History     Socioeconomic History   • Marital status:      Spouse name: Not on file   • Number of children: Not on file   • Years of education: Not on file   • Highest education level: Not on file   Tobacco Use   • Smoking status: Never Smoker   • Smokeless tobacco: Never Used   Substance and Sexual Activity   • Alcohol use: No   • Drug use: No   • Sexual activity: Defer      Current Outpatient Medications on File Prior to Visit   Medication Sig Dispense Refill   • allopurinol (ZYLOPRIM) 100 MG tablet Take 100 mg by mouth Daily.     • aspirin 81 MG chewable tablet Chew 81 mg Daily.     • carvedilol (COREG) 12.5 MG tablet Take 1 tablet by mouth Daily. 90 tablet 3   • cetirizine (zyrTEC) 10 MG tablet Take 10 mg by mouth Daily.     • Comfort EZ Pen Needles 32G X 4 MM misc Once daily     • Easy Touch Test test strip 2 (Two) Times a Day. as directed     • Empagliflozin (Jardiance) 10 MG tablet Take 10 mg by mouth Daily. 30 tablet 5   • Janumet XR   MG tablet TAKE TWO TABLETS BY MOUTH EVERY DAY IN THE MORNING     • montelukast (SINGULAIR) 10 MG tablet Take 10 mg by mouth Every Night.     • pantoprazole (PROTONIX) 40 MG EC tablet Take 40 mg by mouth Daily.     • Rosuvastatin Calcium (CRESTOR PO) Take 10 mg by mouth Daily.     • sacubitril-valsartan (Entresto) 24-26 MG tablet Take 1 tablet by mouth 2 (Two) Times a Day. 60 tablet 6   • spironolactone (ALDACTONE) 50 MG tablet Take 50 mg by mouth Daily.     • tamsulosin (FLOMAX) 0.4 MG capsule 24 hr capsule Take 1 capsule by mouth Every Night.     • torsemide (DEMADEX) 20 MG tablet TAKE 1/2 TABLET BY MOUTH EVERY DAY 45 tablet 6   • Tresiba FlexTouch 100 UNIT/ML solution pen-injector injection INJECT 60 UNITS SUBCUTANEOUSLY EVERY DAY titrate TO fasting glucose UNDER 120- max DOSE of 70 UNITS a DAY       No current facility-administered medications on file prior to visit.       Allergies   Allergen Reactions   • Other GI Intolerance     All pain medications cause constipation        Review of Systems   Constitutional: Negative for activity change, appetite change, chills, diaphoresis, fatigue, fever and unexpected weight change.   HENT: Negative for congestion, dental problem, drooling, ear discharge, ear pain, facial swelling, hearing loss, mouth sores, nosebleeds, postnasal drip, rhinorrhea, sinus pressure, sneezing, sore throat, tinnitus, trouble swallowing and voice change.    Eyes: Negative for photophobia, pain, discharge, redness, itching and visual disturbance.   Respiratory: Negative for apnea, cough, choking, chest tightness, shortness of breath, wheezing and stridor.    Cardiovascular: Negative for chest pain, palpitations and leg swelling.   Gastrointestinal: Negative for abdominal distention, abdominal pain, anal bleeding, blood in stool, constipation, diarrhea, nausea, rectal pain and vomiting.   Endocrine: Negative for cold intolerance, heat intolerance, polydipsia, polyphagia and polyuria.  "  Genitourinary: Negative for decreased urine volume, difficulty urinating, dysuria, enuresis, flank pain, frequency, genital sores, hematuria and urgency.   Musculoskeletal: Positive for arthralgias. Negative for back pain, gait problem, joint swelling, myalgias, neck pain and neck stiffness.   Skin: Negative for color change, pallor, rash and wound.   Allergic/Immunologic: Negative for environmental allergies, food allergies and immunocompromised state.   Neurological: Negative for dizziness, tremors, seizures, syncope, facial asymmetry, speech difficulty, weakness, light-headedness, numbness and headaches.   Hematological: Negative for adenopathy. Does not bruise/bleed easily.   Psychiatric/Behavioral: Negative for agitation, behavioral problems, confusion, decreased concentration, dysphoric mood, hallucinations, self-injury, sleep disturbance and suicidal ideas. The patient is not nervous/anxious and is not hyperactive.         Objective      Physical Exam  Pulse 80   Ht 175.3 cm (69.02\")   Wt 114 kg (251 lb 5.2 oz)   SpO2 98%   BMI 37.10 kg/m²     Body mass index is 37.1 kg/m².    General:   Mental Status:  Alert   Appearance: Cooperative, in no acute distress   Build and Nutrition: Overweight male   Orientation: Alert and oriented to person, place and time   Posture: Normal   Gait: Normal     Upper Extremities:              Left Shoulder:                          Tenderness:    None                          Swelling:          None                          Crepitus:          None                          Atrophy:           None                          Range of motion:        External rotation:         20°                                                              Forward flexion:          130°                                                              Abduction:                   120°  Instability:        None  Deformities:     None  Functional testing: Positive drop arm, negative lift-off, negative " impingement, weak external rotation        Assessment and Plan     Diagnoses and all orders for this visit:    1. Rotator cuff arthropathy of left shoulder (Primary)        1. Rotator cuff arthropathy of left shoulder        I reviewed my findings with the patient today.  He has rotator cuff arthropathy of his left shoulder, and symptoms are mild at the current time.  If it flares up again in the future, we will be happy to see him back.  I discussed referring him to Dr. Ramirez should his shoulder bother him again in the future, for evaluation of shoulder replacement surgery.    Return if symptoms worsen or fail to improve.      Vazquez Montez MD  12/21/20  14:15 YVON Daniels disclaimer:  Much of this encounter note is an electronic transcription/translation of spoken language to printed text. The electronic translation of spoken language may permit erroneous, or at times, nonsensical words or phrases to be inadvertently transcribed; Although I have reviewed the note for such errors, some may still exist.

## 2020-12-31 RX ORDER — SITAGLIPTIN AND METFORMIN HYDROCHLORIDE 1000; 50 MG/1; MG/1
TABLET, FILM COATED, EXTENDED RELEASE ORAL
Qty: 60 TABLET | Refills: 5 | Status: SHIPPED | OUTPATIENT
Start: 2020-12-31 | End: 2021-05-07 | Stop reason: SDUPTHER

## 2021-01-28 ENCOUNTER — OFFICE VISIT (OUTPATIENT)
Dept: CARDIOLOGY | Facility: CLINIC | Age: 61
End: 2021-01-28

## 2021-01-28 VITALS
OXYGEN SATURATION: 97 % | WEIGHT: 250 LBS | DIASTOLIC BLOOD PRESSURE: 80 MMHG | SYSTOLIC BLOOD PRESSURE: 110 MMHG | HEART RATE: 77 BPM | BODY MASS INDEX: 37.03 KG/M2 | HEIGHT: 69 IN

## 2021-01-28 DIAGNOSIS — E11.65 TYPE 2 DIABETES MELLITUS WITH HYPERGLYCEMIA, WITHOUT LONG-TERM CURRENT USE OF INSULIN (HCC): ICD-10-CM

## 2021-01-28 DIAGNOSIS — E78.2 MIXED HYPERLIPIDEMIA: ICD-10-CM

## 2021-01-28 DIAGNOSIS — I10 ESSENTIAL HYPERTENSION: ICD-10-CM

## 2021-01-28 DIAGNOSIS — I50.22 CHRONIC SYSTOLIC CONGESTIVE HEART FAILURE (HCC): Primary | ICD-10-CM

## 2021-01-28 PROCEDURE — 99213 OFFICE O/P EST LOW 20 MIN: CPT | Performed by: INTERNAL MEDICINE

## 2021-01-28 RX ORDER — TORSEMIDE 20 MG/1
TABLET ORAL
Qty: 45 TABLET | Refills: 6 | Status: SHIPPED | OUTPATIENT
Start: 2021-01-28 | End: 2022-02-10 | Stop reason: SDUPTHER

## 2021-01-28 NOTE — PROGRESS NOTES
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    PROBLEM LIST:     1. Ischemic Cardiomyopathy  1. 2008 [facility] wnl EF 60  2. 2/17 [facility] nl cors EF 20%  3. 6/27/17 SJM ICD implant-GFT  4. 3/20 echo 35% rvsp 15mmHg  2. DM -A1c11- 6.5 12/20  Dr CECE Brady et al  Onset 2011  3. HTN  4. HL-on statin  1. 12/20 117/233/34/46  5. TAMAR- cpap intolerant  6. Palpitations  1. 12/16 holter <1%pvc, <1% pac   7. Chronically elevated cpk 4-500      Chief Complaint   Patient presents with   • Chronic systolic congestive heart failure (CMS/HCC)       Allergies  Allergies   Allergen Reactions   • Other GI Intolerance     All pain medications cause constipation       Current Medications    Current Outpatient Medications:   •  allopurinol (ZYLOPRIM) 100 MG tablet, Take 100 mg by mouth Daily., Disp: , Rfl:   •  aspirin 81 MG chewable tablet, Chew 81 mg Daily., Disp: , Rfl:   •  carvedilol (COREG) 12.5 MG tablet, Take 1 tablet by mouth Daily., Disp: 90 tablet, Rfl: 3  •  cetirizine (zyrTEC) 10 MG tablet, Take 10 mg by mouth Daily., Disp: , Rfl:   •  Comfort EZ Pen Needles 32G X 4 MM misc, Once daily, Disp: , Rfl:   •  Easy Touch Test test strip, 2 (Two) Times a Day. as directed, Disp: , Rfl:   •  Empagliflozin (Jardiance) 10 MG tablet, Take 10 mg by mouth Daily., Disp: 30 tablet, Rfl: 5  •  Janumet XR  MG tablet, 2 po daily, Disp: 60 tablet, Rfl: 5  •  montelukast (SINGULAIR) 10 MG tablet, Take 10 mg by mouth Every Night., Disp: , Rfl:   •  pantoprazole (PROTONIX) 40 MG EC tablet, Take 40 mg by mouth Daily., Disp: , Rfl:   •  Rosuvastatin Calcium (CRESTOR PO), Take 10 mg by mouth Daily., Disp: , Rfl:   •  sacubitril-valsartan (Entresto) 24-26 MG tablet, Take 1 tablet by mouth 2 (Two) Times a Day.,  "Disp: 60 tablet, Rfl: 6  •  spironolactone (ALDACTONE) 50 MG tablet, Take 50 mg by mouth Daily., Disp: , Rfl:   •  tamsulosin (FLOMAX) 0.4 MG capsule 24 hr capsule, Take 1 capsule by mouth Every Night., Disp: , Rfl:   •  torsemide (DEMADEX) 20 MG tablet, TAKE 1/2 TABLET BY MOUTH EVERY DAY (Patient taking differently: Pt has been taking 5mg daily), Disp: 45 tablet, Rfl: 6  •  Tresiba FlexTouch 100 UNIT/ML solution pen-injector injection, INJECT 60 UNITS SUBCUTANEOUSLY EVERY DAY titrate TO fasting glucose UNDER 120- max DOSE of 70 UNITS a DAY, Disp: , Rfl:       History of Present Illness   Abdirahman Mclean is a 60 y.o. year old male here for follow up.  Compliant with medication states his shortness of breath has improved but still not ideal.  He is not requiring to take additional diuretics.  He is attempting to watch his sodium intake.  He has had no overt chest discomfort.          OBJECTIVE:  Vitals:    01/28/21 1025   BP: 110/80   BP Location: Right arm   Patient Position: Sitting   Pulse: 77   SpO2: 97%   Weight: 113 kg (250 lb)   Height: 175.3 cm (69\")     Physical Exam   Constitutional: He appears well-developed and well-nourished.   Neck: Normal range of motion. Neck supple. No hepatojugular reflux and no JVD present. Carotid bruit is not present. No tracheal deviation present. No thyromegaly present.   Cardiovascular: Normal rate, regular rhythm, S1 normal, S2 normal, intact distal pulses and normal pulses. PMI is not displaced. Exam reveals no gallop, no distant heart sounds, no friction rub, no midsystolic click and no opening snap.   No murmur heard.  Pulses:       Radial pulses are 2+ on the right side and 2+ on the left side.        Dorsalis pedis pulses are 2+ on the right side and 2+ on the left side.        Posterior tibial pulses are 2+ on the right side and 2+ on the left side.   Pulmonary/Chest: Effort normal and breath sounds normal. He has no wheezes. He has no rales.   Device clean dry and " intact   Abdominal: Soft. Bowel sounds are normal. He exhibits no mass. There is no abdominal tenderness. There is no guarding.       Diagnostic Data:  Procedures      ASSESSMENT:   Diagnosis Plan   1. Chronic systolic congestive heart failure (CMS/[facility])     2. Essential hypertension     3. Mixed hyperlipidemia     4. Type 2 diabetes mellitus with hyperglycemia, without long-term current use of insulin (CMS/[facility])         PLAN:  CHF chronic systolic nonischemic continue current medical regimen  Echocardiogram  next  year    Hypertension controlled carvedilol lisinopril spironolactone    Diabetes insulin requiring oral agents as well followed per Dr. Brady et al counseled regarding need for compliance with low carbohydrate intake    Dallas Crespo MD, thank you for referring Mr. Mclean for evaluation.  I have forwarded my electronically generated recommendations to you for review.  Please do not hesitate to call with any questions.      Danny Licona MD, [facility]

## 2021-01-28 NOTE — PROGRESS NOTES
Abdirahman DAVID Vinay  1960  [phone]    10/07/2020    [facility]     Dallas Crespo MD  [address] [place] [address]    No chief complaint on file.      Problem List:   1. Nonischemic Cardiomyopathy  1. CHF acute EF per [facility] reportedly <30%  2. 12/16 bnp 480  3. 2008 [facility] wnl EF 60  4. 2/17 [facility] nl cors EF 20%  5. 6/27/17 SJM ICD implant  6. ER 2/2019 NSR/ST, PAC  7. Echo 2/2019 LVEF 35-40%  8. Echo 43/4/2020: 35%  2. DM -A1c11- 8.3 2018  Onset 2011  3. HTN  4. HL-on statin  1. 2016 total chol 104  5. TAMAR- cpap intolerant  6. Palpitations/PAF:  1. 12/16 holter <1%pvc, <1% pac   2. Noted short episodes on device interrogation   7. Chronically elevated cpk 4-500    Allergies  Allergies   Allergen Reactions   • Other GI Intolerance     All pain medications cause constipation       Current Medications    Current Outpatient Medications:   •  allopurinol (ZYLOPRIM) 100 MG tablet, Take 100 mg by mouth Daily., Disp: , Rfl:   •  aspirin 81 MG chewable tablet, Chew 81 mg Daily., Disp: , Rfl:   •  carvedilol (COREG) 12.5 MG tablet, Take 1 tablet by mouth Daily., Disp: 90 tablet, Rfl: 3  •  cetirizine (zyrTEC) 10 MG tablet, Take 10 mg by mouth Daily., Disp: , Rfl:   •  Comfort EZ Pen Needles 32G X 4 MM misc, Once daily, Disp: , Rfl:   •  Easy Touch Test test strip, 2 (Two) Times a Day. as directed, Disp: , Rfl:   •  Empagliflozin (Jardiance) 10 MG tablet, Take 10 mg by mouth Daily., Disp: 30 tablet, Rfl: 5  •  Janumet XR  MG tablet, 2 po daily, Disp: 60 tablet, Rfl: 5  •  montelukast (SINGULAIR) 10 MG tablet, Take 10 mg by mouth Every Night., Disp: , Rfl:   •  pantoprazole (PROTONIX) 40 MG EC tablet, Take 40 mg by mouth Daily., Disp: , Rfl:   •  Rosuvastatin Calcium (CRESTOR PO), Take 10 mg by mouth Daily., Disp: , Rfl:   •  sacubitril-valsartan (Entresto) 24-26 MG tablet, Take 1 tablet by mouth 2 (Two) Times a Day., Disp: 60 tablet, Rfl: 6  •   spironolactone (ALDACTONE) 50 MG tablet, Take 50 mg by mouth Daily., Disp: , Rfl:   •  tamsulosin (FLOMAX) 0.4 MG capsule 24 hr capsule, Take 1 capsule by mouth Every Night., Disp: , Rfl:   •  torsemide (DEMADEX) 20 MG tablet, TAKE 1/2 TABLET BY MOUTH EVERY DAY, Disp: 45 tablet, Rfl: 6  •  Tresiba FlexTouch 100 UNIT/ML solution pen-injector injection, INJECT 60 UNITS SUBCUTANEOUSLY EVERY DAY titrate TO fasting glucose UNDER 120- max DOSE of 70 UNITS a DAY, Disp: , Rfl:     History of Present Illness     Pt presents for follow up of CHF, NICM, ICD check, and PAF. Since we last saw the pt, pt denies any palpitations. He has some SOB when walking long distances or walking uphill. He denies CP, LH, and dizziness, syncope.  Denies any hospitalizations, ER visits, bleeding, or TIA/CVA symptoms. Overall feels well. BP runs in the low 100s.     ROS:  General:  + fatigue, - weight gain or loss  Cardiovascular:  Denies CP, PND, syncope, near syncope, + edema - palpitations.  Pulmonary:  Denies MIRAMONTES, cough, or wheezing      There were no vitals filed for this visit.  There is no height or weight on file to calculate BMI.  PE:  General: NAD. A & O x 3   Neck: no JVD, no carotid bruits, no TM  Heart RRR, NL S1, S2, S4 present, no rubs, murmurs  Lungs: CTA, no wheezes, rhonchi, or rales  Abd: soft, non-tender, NL BS  Ext: No musculoskeletal deformities, no edema, cyanosis, or clubbing  Psych: normal mood and affect    Diagnostic Data:    ICD Manual Interrogation: RV paced less than 1%, no VT. Short episodes of PAF, lasting longest 26 seconds. 6.2-6.5 years on battery.     Procedures    No diagnosis found.      Plan:    1. NICM: on appropriate meds: no changes for now  - EF 20% in 2017, now LVEF 35%   - ICD function normal  - Class II symptoms. Will stop Lisinopril today, obtain BMP, start Entresto 24/26 mg BID. Need BMP in 5 days after starting it.      2. PAF:   - minimal episodes, longest 26 seconds.      3. HTN: BP well  controlled    F/up in 6 months    Electronically signed by SHASTA Wolf, 10/07/20, 4:22 PM EDT.

## 2021-03-15 DIAGNOSIS — E11.65 TYPE 2 DIABETES MELLITUS WITH HYPERGLYCEMIA (HCC): ICD-10-CM

## 2021-03-15 RX ORDER — INSULIN DEGLUDEC INJECTION 100 U/ML
INJECTION, SOLUTION SUBCUTANEOUS
Qty: 21 ML | Refills: 5 | Status: SHIPPED | OUTPATIENT
Start: 2021-03-15 | End: 2021-06-09 | Stop reason: CLARIF

## 2021-03-17 ENCOUNTER — OFFICE VISIT (OUTPATIENT)
Dept: ENDOCRINOLOGY | Facility: CLINIC | Age: 61
End: 2021-03-17

## 2021-03-17 VITALS
SYSTOLIC BLOOD PRESSURE: 122 MMHG | BODY MASS INDEX: 37 KG/M2 | OXYGEN SATURATION: 95 % | WEIGHT: 249.8 LBS | DIASTOLIC BLOOD PRESSURE: 78 MMHG | HEIGHT: 69 IN | HEART RATE: 73 BPM | TEMPERATURE: 98 F

## 2021-03-17 DIAGNOSIS — I10 ESSENTIAL HYPERTENSION: ICD-10-CM

## 2021-03-17 DIAGNOSIS — E78.5 HYPERLIPIDEMIA LDL GOAL <100: ICD-10-CM

## 2021-03-17 DIAGNOSIS — E11.65 TYPE 2 DIABETES MELLITUS WITH HYPERGLYCEMIA, WITH LONG-TERM CURRENT USE OF INSULIN (HCC): Primary | ICD-10-CM

## 2021-03-17 DIAGNOSIS — Z79.4 TYPE 2 DIABETES MELLITUS WITH HYPERGLYCEMIA, WITH LONG-TERM CURRENT USE OF INSULIN (HCC): Primary | ICD-10-CM

## 2021-03-17 DIAGNOSIS — Z79.4 INSULIN LONG-TERM USE (HCC): ICD-10-CM

## 2021-03-17 LAB
EXPIRATION DATE: NORMAL
HBA1C MFR BLD: 6.5 %
Lab: NORMAL

## 2021-03-17 PROCEDURE — 99214 OFFICE O/P EST MOD 30 MIN: CPT | Performed by: INTERNAL MEDICINE

## 2021-03-17 PROCEDURE — 83036 HEMOGLOBIN GLYCOSYLATED A1C: CPT | Performed by: INTERNAL MEDICINE

## 2021-03-17 RX ORDER — GLUCOSAM/CHON-MSM1/C/MANG/BOSW 500-416.6
TABLET ORAL
COMMUNITY
Start: 2021-02-26

## 2021-03-17 NOTE — PROGRESS NOTES
"     Office Note      Date: 2021  Patient Name: Abdirahman Mclean  MRN: [id number]  : 1960    Chief Complaint   Patient presents with   • Follow-up   • Diabetes       History of Present Illness:   Abdirahman Mclean is a 60 y.o. male who presents for Diabetes type 2. Diagnosed in: . Treated in past with oral agents. Current treatments: janumet, jardiance and basal insulin. Number of insulin shots per day: 1. Checks blood sugar 2 times a day. Has low blood sugar: no. Aspirin use: Yes. Statin use: Yes. ACE-I/ARB use: Yes. Changes in health since last visit: none. Last eye exam .    Subjective      Diabetic Complications:  Eyes: No  Kidneys: No  Feet: No  Heart: Yes - cardiomyopathy    Diet and Exercise:  Meals per day: 2  Minutes of exercise per week: 70 mins.    Review of Systems:   Review of Systems   Constitutional: Negative.    Respiratory: Positive for shortness of breath.    Cardiovascular: Negative.    Gastrointestinal: Negative.    Endocrine: Negative.        The following portions of the patient's history were reviewed and updated as appropriate: allergies, current medications, past family history, past medical history, past social history, past surgical history and problem list.    Objective       Visit Vitals  /78   Pulse 73   Temp 98 °F (36.7 °C) (Infrared)   Ht 175.3 cm (69\")   Wt 113 kg (249 lb 12.8 oz)   SpO2 95%   BMI 36.89 kg/m²       Physical Exam:  Physical Exam  Constitutional:       Appearance: Normal appearance.   Neurological:      Mental Status: He is alert.         Labs:    HbA1c  Lab Results   Component Value Date    HGBA1C 6.5 2021       CMP  Lab Results   Component Value Date    GLUCOSE 121 (H) 12/15/2020    BUN 15 12/15/2020    CREATININE 0.87 12/15/2020    EGFRIFNONA 89 12/15/2020    BCR 17.2 12/15/2020    K 4.4 12/15/2020    CO2 25.2 12/15/2020    CALCIUM 9.3 12/15/2020    AST 23 12/15/2020    ALT 27 12/15/2020        Lipid Panel  Lab Results   Component Value " Date    HDL 34 (L) 12/15/2020    LDL 46 12/15/2020    TRIG 233 (H) 12/15/2020        TSH  Lab Results   Component Value Date    TSH 1.690 12/15/2020        Hemoglobin A1C  Lab Results   Component Value Date    HGBA1C 6.5 03/17/2021        Microalbumin/Creatinine  Lab Results   Component Value Date    MALBCRERATIO 22.2 12/15/2020    MICROALBUR 1.3 12/15/2020           Assessment / Plan      Assessment & Plan:  Diagnoses and all orders for this visit:    1. Type 2 diabetes mellitus with hyperglycemia, with long-term current use of insulin (CMS/[facility]) (Primary)  Assessment & Plan:  Diabetes is unchanged.   Continue current treatment regimen.  Diabetes will be reassessed in 3 months.    Orders:  -     POC Glycosylated Hemoglobin (Hb A1C)    2. Essential hypertension  Assessment & Plan:  Hypertension is unchanged.  Continue current treatment regimen.  Blood pressure will be reassessed at the next regular appointment.      3. Hyperlipidemia LDL goal <100  Assessment & Plan:  Continue statin.      4. Insulin long-term use (CMS/[facility])      Return in about 3 months (around 6/17/2021) for Recheck with A1c.    Hugo Andrade MD   03/17/2021

## 2021-04-05 ENCOUNTER — TELEPHONE (OUTPATIENT)
Dept: ENDOCRINOLOGY | Facility: CLINIC | Age: 61
End: 2021-04-05

## 2021-04-05 NOTE — TELEPHONE ENCOUNTER
Message from plan: PA Case: [id number], Status: Approved, Coverage Starts on: 4/2/2021 12:00:00 AM, Coverage Ends on: 4/5/2022   JAVIER OLIVIA ([place]: [id number])  Rx #: [id number]  Janumet XR 50-1000MG er tablets     Form  [place] [employer] Electronic PA Form (2017 [place]CPDP)

## 2021-04-25 PROCEDURE — 93296 REM INTERROG EVL PM/IDS: CPT | Performed by: INTERNAL MEDICINE

## 2021-04-25 PROCEDURE — 93295 DEV INTERROG REMOTE 1/2/MLT: CPT | Performed by: INTERNAL MEDICINE

## 2021-04-28 ENCOUNTER — OFFICE VISIT (OUTPATIENT)
Dept: CARDIOLOGY | Facility: CLINIC | Age: 61
End: 2021-04-28

## 2021-04-28 VITALS
SYSTOLIC BLOOD PRESSURE: 118 MMHG | WEIGHT: 251.8 LBS | HEIGHT: 69 IN | HEART RATE: 86 BPM | DIASTOLIC BLOOD PRESSURE: 84 MMHG | BODY MASS INDEX: 37.29 KG/M2 | OXYGEN SATURATION: 96 %

## 2021-04-28 DIAGNOSIS — I10 ESSENTIAL HYPERTENSION: ICD-10-CM

## 2021-04-28 DIAGNOSIS — I48.0 PAROXYSMAL A-FIB (HCC): ICD-10-CM

## 2021-04-28 DIAGNOSIS — I42.8 NON-ISCHEMIC CARDIOMYOPATHY (HCC): Primary | ICD-10-CM

## 2021-04-28 PROCEDURE — 99213 OFFICE O/P EST LOW 20 MIN: CPT | Performed by: INTERNAL MEDICINE

## 2021-04-28 RX ORDER — EMPAGLIFLOZIN 10 MG/1
TABLET, FILM COATED ORAL
Qty: 30 TABLET | Refills: 5 | Status: SHIPPED | OUTPATIENT
Start: 2021-04-28 | End: 2021-04-29 | Stop reason: SDUPTHER

## 2021-04-28 NOTE — PROGRESS NOTES
Abdirahman HERNANDEZ Vinay  1960  [phone]    04/28/2021    [facility] CARDIOLOGY     Referring Provider: No ref. provider found     Dallas Crespo MD  [address] [place] [address] [facility]  [place] [address]    Chief Complaint   Patient presents with   • Chronic systolic congestive heart failure     f/u       Problem List:    1.  Nonischemic Cardiomyopathy  1. CHF acute EF per [facility] reportedly <30%  2. 12/16 bnp 480  3. 2008 [facility] wnl EF 60  4. 2/17 [facility] nl cors EF 20%  5. 6/27/17 SJM ICD implant  6. ER 2/2019 NSR/ST, PAC  7. Echo 2/2019 LVEF 35-40%  8. Echo 3/4/2020: 35%  2. DM -A1c11- 8.3 2018  Onset 2011  3. HTN  4. HL-on statin  1. 2016 total chol 104  5. TAMAR- cpap intolerant  6. Palpitations/PAF:  1. 12/16 holter <1%pvc, <1% pac   2. Noted short episodes on device interrogation   7. Chronically elevated cpk 4-500    Allergies  Allergies   Allergen Reactions   • Other GI Intolerance     All pain medications cause constipation       Current Medications    Current Outpatient Medications:   •  allopurinol (ZYLOPRIM) 100 MG tablet, Take 100 mg by mouth Daily., Disp: , Rfl:   •  aspirin 81 MG chewable tablet, Chew 81 mg Daily., Disp: , Rfl:   •  carvedilol (COREG) 12.5 MG tablet, Take 1 tablet by mouth Daily., Disp: 90 tablet, Rfl: 3  •  cetirizine (zyrTEC) 10 MG tablet, Take 10 mg by mouth Daily., Disp: , Rfl:   •  Comfort EZ Pen Needles 32G X 4 MM misc, Once daily, Disp: , Rfl:   •  Easy Touch Test test strip, 2 (Two) Times a Day. as directed, Disp: , Rfl:   •  Empagliflozin (Jardiance) 10 MG tablet, Take 10 mg by mouth Daily., Disp: 30 tablet, Rfl: 5  •  Janumet XR  MG tablet, 2 po daily, Disp: 60 tablet, Rfl: 5  •  montelukast (SINGULAIR) 10 MG tablet, Take 10 mg by mouth Every Night., Disp: , Rfl:   •  pantoprazole (PROTONIX) 40 MG EC tablet, Take 40 mg by mouth Daily., Disp: , Rfl:   •  rosuvastatin (Crestor) 10 MG tablet, Take 10 mg by mouth Daily., Disp: , Rfl:   •  sacubitril-valsartan  "(Entresto) 24-26 MG tablet, Take 1 tablet by mouth 2 (Two) Times a Day., Disp: 60 tablet, Rfl: 6  •  spironolactone (ALDACTONE) 50 MG tablet, Take 50 mg by mouth Daily., Disp: , Rfl:   •  tamsulosin (FLOMAX) 0.4 MG capsule 24 hr capsule, Take 1 capsule by mouth Every Night., Disp: , Rfl:   •  torsemide (DEMADEX) 20 MG tablet, Pt has been taking 5mg daily, Disp: 45 tablet, Rfl: 6  •  Tresiba FlexTouch 100 UNIT/ML solution pen-injector injection, INJECT 60 UNITS SUBCUTANEOUSLY EVERY DAY titrate TO fasting glucose UNDER 120- max DOSE of 70 UNITS a DAY, Disp: 21 mL, Rfl: 5  •  TRUEplus Lancets 30G misc, USE TO CHECK BLOOD GLUCOSE LEVEL TWICE DAILY, Disp: , Rfl:     History of Present Illness     Pt presents for follow up of CHF, NICM, ICD check, and PAF. Since we last saw the pt, pt denies any AF episodes, CP, LH, and dizziness. Mild SOB with moderate exertion, this is not changed.He was initiated on Entresto 24/26 by Dr. Garcia , has done very well with medication. Denies any hospitalizations, ER visits, bleeding issues on ASA, or TIA/CVA symptoms. Bp well controlled. Patient did receive 2 doses of moderna vaccine and did well. Overall feels well.     ROS:  General:  Denies fatigue, weight gain or loss  Cardiovascular:  Denies CP, PND, syncope, near syncope, edema or palpitations.  Pulmonary:  Denies MIRAMONTES, cough, or wheezing      Vitals:    04/28/21 1443   BP: 118/84   BP Location: Right arm   Patient Position: Sitting   Pulse: 86   SpO2: 96%   Weight: 114 kg (251 lb 12.8 oz)   Height: 175.3 cm (69\")     Body mass index is 37.18 kg/m².  PE:  General: NAD  Neck: no JVD, no carotid bruits, no TM  Heart RRR, NL S1, S2, no rubs, murmurs  Lungs: CTA, no wheezes, rhonchi, or rales  Abd: soft, non-tender, NL BS  Ext: No musculoskeletal deformities, no edema, cyanosis, or clubbing  Psych: normal mood and affect    Diagnostic Data:    Manual Device Interrogation: St Norm ICD, VVI rate 40, RV paced <1%, VS @ 80 bpm, no events, " 6 years on battery.     Procedures    1. Non-ischemic cardiomyopathy (CMS/HCC)    2. Paroxysmal A-fib (CMS/HCC)    3. Essential hypertension        Plan:    1. NICM: on appropriate meds: no changes for now  - EF 20% in 2017, now LVEF 35%   - ICD function normal  - Class II symptoms, doing well on Entresto with improvement of symptoms, continue Entresto 24/26 mg BID.  -Echocardiogram ordered with Dr. Licona for next Jan 2022.      2. PAF:   -CHADsVasc=3 on ASA, continue   -no events on device interrogation.      3. HTN:   -BP well controlled, continue medications    F/up in 6 months    Electronically signed by ROLAND Guillen, 04/28/21, 3:03 PM EDT.

## 2021-04-29 NOTE — TELEPHONE ENCOUNTER
He said his insurance ends on the first and asked if we could get this in by the end of the day tomorrow.

## 2021-04-30 RX ORDER — EMPAGLIFLOZIN 10 MG/1
1 TABLET, FILM COATED ORAL DAILY
Qty: 30 TABLET | Refills: 5 | Status: SHIPPED | OUTPATIENT
Start: 2021-04-30 | End: 2021-12-14

## 2021-05-07 RX ORDER — SACUBITRIL AND VALSARTAN 24; 26 MG/1; MG/1
1 TABLET, FILM COATED ORAL 2 TIMES DAILY
Qty: 60 TABLET | Refills: 6 | Status: SHIPPED | OUTPATIENT
Start: 2021-05-07 | End: 2022-01-11

## 2021-05-10 RX ORDER — SITAGLIPTIN AND METFORMIN HYDROCHLORIDE 1000; 50 MG/1; MG/1
TABLET, FILM COATED, EXTENDED RELEASE ORAL
Qty: 60 TABLET | Refills: 5 | Status: SHIPPED | OUTPATIENT
Start: 2021-05-10 | End: 2021-05-14 | Stop reason: CLARIF

## 2021-05-14 ENCOUNTER — PRIOR AUTHORIZATION (OUTPATIENT)
Dept: ENDOCRINOLOGY | Facility: CLINIC | Age: 61
End: 2021-05-14

## 2021-05-14 ENCOUNTER — TELEPHONE (OUTPATIENT)
Dept: ENDOCRINOLOGY | Facility: CLINIC | Age: 61
End: 2021-05-14

## 2021-05-14 RX ORDER — METFORMIN HYDROCHLORIDE 500 MG/1
1000 TABLET, EXTENDED RELEASE ORAL 2 TIMES DAILY
Qty: 360 TABLET | Refills: 3 | Status: SHIPPED | OUTPATIENT
Start: 2021-05-14 | End: 2022-05-24

## 2021-05-17 ENCOUNTER — PRIOR AUTHORIZATION (OUTPATIENT)
Dept: ENDOCRINOLOGY | Facility: CLINIC | Age: 61
End: 2021-05-17

## 2021-05-18 ENCOUNTER — HOSPITAL ENCOUNTER (OUTPATIENT)
Age: 61
End: 2021-05-18
Payer: COMMERCIAL

## 2021-05-18 DIAGNOSIS — M1A.09X0: ICD-10-CM

## 2021-05-18 DIAGNOSIS — Z79.84: ICD-10-CM

## 2021-05-18 DIAGNOSIS — E78.2: ICD-10-CM

## 2021-05-18 DIAGNOSIS — E11.69: Primary | ICD-10-CM

## 2021-05-18 DIAGNOSIS — N40.0: ICD-10-CM

## 2021-05-18 LAB
ALBUMIN LEVEL: 4.3 G/DL (ref 3.5–5)
ALBUMIN/GLOB SERPL: 1.5 {RATIO} (ref 1.1–1.8)
ALP ISO SERPL-ACNC: 116 U/L (ref 38–126)
ALT SERPLBLD-CCNC: 24 U/L (ref 12–78)
ANION GAP SERPL CALC-SCNC: 12.4 MEQ/L (ref 5–15)
AST SERPL QL: 26 U/L (ref 17–59)
BILIRUBIN,TOTAL: 0.8 MG/DL (ref 0.2–1.3)
BUN SERPL-MCNC: 16 MG/DL (ref 9–20)
CALCIUM SPEC-MCNC: 8.8 MG/DL (ref 8.4–10.2)
CHLORIDE SPEC-SCNC: 104 MMOL/L (ref 98–107)
CHOLEST SPEC-SCNC: 139 MG/DL (ref 140–200)
CO2 SERPL-SCNC: 27 MMOL/L (ref 22–30)
CREAT BLD-SCNC: 0.9 MG/DL (ref 0.66–1.25)
ESTIMATED GLOMERULAR FILT RATE: 85 ML/MIN (ref 60–?)
GFR (AFRICAN AMERICAN): 103 ML/MIN (ref 60–?)
GLOBULIN SER CALC-MCNC: 2.8 G/DL (ref 1.3–3.2)
GLUCOSE: 146 MG/DL (ref 74–100)
HBA1C MFR BLD: 6.5 % (ref 4–6)
HCT VFR BLD CALC: 45.5 % (ref 42–52)
HDLC SERPL-MCNC: 30 MG/DL (ref 40–60)
HGB BLD-MCNC: 14.9 G/DL (ref 14.1–18)
MCHC RBC-ENTMCNC: 32.7 G/DL (ref 31.8–35.4)
MCV RBC: 84.8 FL (ref 80–94)
MEAN CORPUSCULAR HEMOGLOBIN: 27.7 PG (ref 27–31.2)
PLATELET # BLD: 245 K/MM3 (ref 142–424)
POTASSIUM: 4.4 MMOL/L (ref 3.5–5.1)
PROT SERPL-MCNC: 7.1 G/DL (ref 6.3–8.2)
RBC # BLD AUTO: 5.37 M/MM3 (ref 4.6–6.2)
SODIUM SPEC-SCNC: 139 MMOL/L (ref 136–145)
TRIGLYCERIDES: 366 MG/DL (ref 30–150)
URATE SERPL-SCNC: 4.6 MG/DL (ref 3.5–8.5)
WBC # BLD AUTO: 9.6 K/MM3 (ref 4.8–10.8)

## 2021-05-18 PROCEDURE — G0103 PSA SCREENING: HCPCS

## 2021-05-18 PROCEDURE — 36415 COLL VENOUS BLD VENIPUNCTURE: CPT

## 2021-05-18 PROCEDURE — 83036 HEMOGLOBIN GLYCOSYLATED A1C: CPT

## 2021-05-18 PROCEDURE — 85025 COMPLETE CBC W/AUTO DIFF WBC: CPT

## 2021-05-18 PROCEDURE — 82043 UR ALBUMIN QUANTITATIVE: CPT

## 2021-05-18 PROCEDURE — 82570 ASSAY OF URINE CREATININE: CPT

## 2021-05-18 PROCEDURE — 80061 LIPID PANEL: CPT

## 2021-05-18 PROCEDURE — 84550 ASSAY OF BLOOD/URIC ACID: CPT

## 2021-05-18 PROCEDURE — 80053 COMPREHEN METABOLIC PANEL: CPT

## 2021-06-09 ENCOUNTER — TELEPHONE (OUTPATIENT)
Dept: ENDOCRINOLOGY | Facility: CLINIC | Age: 61
End: 2021-06-09

## 2021-06-09 RX ORDER — INSULIN DETEMIR 100 [IU]/ML
60 INJECTION, SOLUTION SUBCUTANEOUS DAILY
Qty: 21 ML | Refills: 3 | Status: SHIPPED | OUTPATIENT
Start: 2021-06-09 | End: 2022-10-31 | Stop reason: SDUPTHER

## 2021-06-09 NOTE — TELEPHONE ENCOUNTER
PT CALLED STATING THAT HIS TRESIBA IS NO LONGER COVERED BY HIS INSURANCE. I ENCOURAGED PT TO REACH OUT TO HIS INSURANCE PROVIDER TO SEE WHAT IS COVERED. HE REQUESTED A CALL BACK.

## 2021-06-09 NOTE — TELEPHONE ENCOUNTER
Pt called back but he could not remember any of the medications they told him that was covered.  Please check with Dr Andrade and see if we can try something else  Pt uses clinic pharmacy in [place]    Please call the pt back  [phone]

## 2021-06-21 ENCOUNTER — PRIOR AUTHORIZATION (OUTPATIENT)
Dept: ENDOCRINOLOGY | Facility: CLINIC | Age: 61
End: 2021-06-21

## 2021-07-02 ENCOUNTER — OFFICE VISIT (OUTPATIENT)
Dept: ENDOCRINOLOGY | Facility: CLINIC | Age: 61
End: 2021-07-02

## 2021-07-02 VITALS
SYSTOLIC BLOOD PRESSURE: 120 MMHG | WEIGHT: 250.4 LBS | DIASTOLIC BLOOD PRESSURE: 73 MMHG | HEART RATE: 74 BPM | HEIGHT: 69 IN | BODY MASS INDEX: 37.09 KG/M2 | OXYGEN SATURATION: 94 %

## 2021-07-02 DIAGNOSIS — Z79.4 INSULIN LONG-TERM USE (HCC): ICD-10-CM

## 2021-07-02 DIAGNOSIS — I10 ESSENTIAL HYPERTENSION: ICD-10-CM

## 2021-07-02 DIAGNOSIS — E11.65 TYPE 2 DIABETES MELLITUS WITH HYPERGLYCEMIA, WITH LONG-TERM CURRENT USE OF INSULIN (HCC): Primary | ICD-10-CM

## 2021-07-02 DIAGNOSIS — Z79.4 TYPE 2 DIABETES MELLITUS WITH HYPERGLYCEMIA, WITH LONG-TERM CURRENT USE OF INSULIN (HCC): Primary | ICD-10-CM

## 2021-07-02 DIAGNOSIS — E78.5 HYPERLIPIDEMIA LDL GOAL <100: ICD-10-CM

## 2021-07-02 LAB
EXPIRATION DATE: NORMAL
EXPIRATION DATE: NORMAL
GLUCOSE BLDC GLUCOMTR-MCNC: 101 MG/DL (ref 70–130)
HBA1C MFR BLD: 6.5 %
Lab: NORMAL
Lab: NORMAL

## 2021-07-02 PROCEDURE — 82947 ASSAY GLUCOSE BLOOD QUANT: CPT | Performed by: INTERNAL MEDICINE

## 2021-07-02 PROCEDURE — 83036 HEMOGLOBIN GLYCOSYLATED A1C: CPT | Performed by: INTERNAL MEDICINE

## 2021-07-02 PROCEDURE — 99214 OFFICE O/P EST MOD 30 MIN: CPT | Performed by: INTERNAL MEDICINE

## 2021-07-02 NOTE — PROGRESS NOTES
"     Office Note      Date: 2021  Patient Name: Abdirahman Mclean  MRN: [id number]  : 1960    Chief Complaint   Patient presents with   • Follow-up   • Diabetes     type2   • Diabetic Eye Exam     1 year ago       History of Present Illness:   Abdirahman Mclean is a 61 y.o. male who presents for Diabetes type 2. Diagnosed in: . Treated in past with oral agents. Current treatments: janumet, jardiance and basal insulin. Number of insulin shots per day: 1. Checks blood sugar 2 times a day. Has low blood sugar: no. Aspirin use: Yes. Statin use: Yes. ACE-I/ARB use: Yes. Changes in health since last visit: none. Last eye exam .    Subjective      Diabetic Complications:  Eyes: No  Kidneys: No  Feet: No  Heart: Yes - cardiomyopathy    Diet and Exercise:  Meals per day: 2  Minutes of exercise per week: 70 mins.    Review of Systems:   Review of Systems   Constitutional: Negative.    Cardiovascular: Negative.    Gastrointestinal: Negative.    Endocrine: Negative.        The following portions of the patient's history were reviewed and updated as appropriate: allergies, current medications, past family history, past medical history, past social history, past surgical history and problem list.    Objective       Visit Vitals  /73   Pulse 74   Ht 175.3 cm (69\")   Wt 114 kg (250 lb 6.4 oz)   SpO2 94%   BMI 36.98 kg/m²       Physical Exam:  Physical Exam  Constitutional:       Appearance: Normal appearance.   Neurological:      Mental Status: He is alert.         Labs:    HbA1c  Lab Results   Component Value Date    HGBA1C 6.5 2021       CMP  Lab Results   Component Value Date    GLUCOSE 121 (H) 12/15/2020    BUN 15 12/15/2020    CREATININE 0.87 12/15/2020    EGFRIFNONA 89 12/15/2020    BCR 17.2 12/15/2020    K 4.4 12/15/2020    CO2 25.2 12/15/2020    CALCIUM 9.3 12/15/2020    AST 23 12/15/2020    ALT 27 12/15/2020        Lipid Panel  Lab Results   Component Value Date    HDL 34 (L) 12/15/2020    LDL " 46 12/15/2020    TRIG 233 (H) 12/15/2020        TSH  Lab Results   Component Value Date    TSH 1.690 12/15/2020        Hemoglobin A1C  Lab Results   Component Value Date    HGBA1C 6.5 07/02/2021        Microalbumin/Creatinine  Lab Results   Component Value Date    MALBCRERATIO 22.2 12/15/2020    MICROALBUR 1.3 12/15/2020           Assessment / Plan      Assessment & Plan:  Diagnoses and all orders for this visit:    1. Type 2 diabetes mellitus with hyperglycemia, with long-term current use of insulin (CMS/[facility]) (Primary)  Assessment & Plan:  Diabetes is unchanged.   Continue current treatment regimen.  Diabetes will be reassessed in 3 months.    Orders:  -     POC Glucose, Blood  -     POC Glycosylated Hemoglobin (Hb A1C)    2. Essential hypertension  Assessment & Plan:  Hypertension is unchanged.  Continue current treatment regimen.  Blood pressure will be reassessed at the next regular appointment.      3. Hyperlipidemia LDL goal <100  Assessment & Plan:  Continue statin.      4. Insulin long-term use (CMS/[facility])      Return in about 3 months (around 10/2/2021) for Recheck with A1c.    Hugo Andrade MD   07/02/2021

## 2021-07-25 PROCEDURE — 93295 DEV INTERROG REMOTE 1/2/MLT: CPT | Performed by: INTERNAL MEDICINE

## 2021-07-25 PROCEDURE — 93296 REM INTERROG EVL PM/IDS: CPT | Performed by: INTERNAL MEDICINE

## 2021-07-30 RX ORDER — CARVEDILOL 12.5 MG/1
TABLET ORAL
Qty: 90 TABLET | Refills: 3 | Status: SHIPPED | OUTPATIENT
Start: 2021-07-30 | End: 2022-02-10 | Stop reason: SDUPTHER

## 2021-09-24 ENCOUNTER — PRIOR AUTHORIZATION (OUTPATIENT)
Dept: ENDOCRINOLOGY | Facility: CLINIC | Age: 61
End: 2021-09-24

## 2021-09-27 ENCOUNTER — TELEPHONE (OUTPATIENT)
Dept: ENDOCRINOLOGY | Facility: CLINIC | Age: 61
End: 2021-09-27

## 2021-09-27 NOTE — TELEPHONE ENCOUNTER
Rx sent for tradjenta to take the place of sriuvia per insurance requirements.  Would you let him know?

## 2021-09-29 RX ORDER — LANCETS 33 GAUGE
EACH MISCELLANEOUS
Qty: 100 EACH | Refills: 3 | Status: SHIPPED | OUTPATIENT
Start: 2021-09-29 | End: 2022-11-09

## 2021-10-20 RX ORDER — INSULIN GLARGINE 300 U/ML
INJECTION, SOLUTION SUBCUTANEOUS
Qty: 21 ML | Refills: 3 | Status: SHIPPED | OUTPATIENT
Start: 2021-10-20 | End: 2022-02-10 | Stop reason: ALTCHOICE

## 2021-10-20 NOTE — TELEPHONE ENCOUNTER
Called and s/w pt in re: to medication adjustment; pt is agreeable and would like it sent to his pharmacy. Pt had no further questions or concerns

## 2021-10-20 NOTE — TELEPHONE ENCOUNTER
Pt called to say that his insurance wont cover his insulin pen levemir flex touch but will cover lantus or toujeomax. He wants to know if one of those options could be sent in for him.

## 2021-10-24 PROCEDURE — 93296 REM INTERROG EVL PM/IDS: CPT | Performed by: INTERNAL MEDICINE

## 2021-10-24 PROCEDURE — 93295 DEV INTERROG REMOTE 1/2/MLT: CPT | Performed by: INTERNAL MEDICINE

## 2021-11-16 ENCOUNTER — OFFICE VISIT (OUTPATIENT)
Dept: ENDOCRINOLOGY | Facility: CLINIC | Age: 61
End: 2021-11-16

## 2021-11-16 VITALS
HEIGHT: 69 IN | DIASTOLIC BLOOD PRESSURE: 74 MMHG | SYSTOLIC BLOOD PRESSURE: 118 MMHG | WEIGHT: 251.4 LBS | BODY MASS INDEX: 37.23 KG/M2

## 2021-11-16 DIAGNOSIS — E11.65 TYPE 2 DIABETES MELLITUS WITH HYPERGLYCEMIA, WITH LONG-TERM CURRENT USE OF INSULIN (HCC): Primary | ICD-10-CM

## 2021-11-16 DIAGNOSIS — E11.65 UNCONTROLLED TYPE 2 DIABETES MELLITUS WITH HYPERGLYCEMIA (HCC): ICD-10-CM

## 2021-11-16 DIAGNOSIS — E78.5 HYPERLIPIDEMIA LDL GOAL <100: ICD-10-CM

## 2021-11-16 DIAGNOSIS — Z79.4 INSULIN LONG-TERM USE (HCC): ICD-10-CM

## 2021-11-16 DIAGNOSIS — Z79.4 TYPE 2 DIABETES MELLITUS WITH HYPERGLYCEMIA, WITH LONG-TERM CURRENT USE OF INSULIN (HCC): Primary | ICD-10-CM

## 2021-11-16 DIAGNOSIS — I10 ESSENTIAL HYPERTENSION: ICD-10-CM

## 2021-11-16 LAB
EXPIRATION DATE: NORMAL
EXPIRATION DATE: NORMAL
GLUCOSE BLDC GLUCOMTR-MCNC: 83 MG/DL (ref 70–130)
HBA1C MFR BLD: 6.7 %
Lab: NORMAL
Lab: NORMAL

## 2021-11-16 PROCEDURE — 3044F HG A1C LEVEL LT 7.0%: CPT | Performed by: INTERNAL MEDICINE

## 2021-11-16 PROCEDURE — 83036 HEMOGLOBIN GLYCOSYLATED A1C: CPT | Performed by: INTERNAL MEDICINE

## 2021-11-16 PROCEDURE — 82947 ASSAY GLUCOSE BLOOD QUANT: CPT | Performed by: INTERNAL MEDICINE

## 2021-11-16 PROCEDURE — 99214 OFFICE O/P EST MOD 30 MIN: CPT | Performed by: INTERNAL MEDICINE

## 2021-11-16 NOTE — ASSESSMENT & PLAN NOTE
Diabetes is unchanged.  A1c okay at 6.7%.  Continue current treatment regimen.  Diabetes will be reassessed in 3 months.    He asks about CGM today.  Will send Rx to see if this is covered.

## 2021-11-24 ENCOUNTER — OFFICE VISIT (OUTPATIENT)
Dept: CARDIOLOGY | Facility: CLINIC | Age: 61
End: 2021-11-24

## 2021-11-24 VITALS
HEIGHT: 69 IN | DIASTOLIC BLOOD PRESSURE: 88 MMHG | WEIGHT: 255.6 LBS | HEART RATE: 81 BPM | SYSTOLIC BLOOD PRESSURE: 128 MMHG | BODY MASS INDEX: 37.86 KG/M2 | OXYGEN SATURATION: 95 %

## 2021-11-24 DIAGNOSIS — I42.8 NON-ISCHEMIC CARDIOMYOPATHY (HCC): Primary | ICD-10-CM

## 2021-11-24 DIAGNOSIS — I50.22 CHRONIC SYSTOLIC CONGESTIVE HEART FAILURE, NYHA CLASS 3 (HCC): ICD-10-CM

## 2021-11-24 DIAGNOSIS — I10 ESSENTIAL HYPERTENSION: ICD-10-CM

## 2021-11-24 PROCEDURE — 93282 PRGRMG EVAL IMPLANTABLE DFB: CPT | Performed by: INTERNAL MEDICINE

## 2021-11-24 PROCEDURE — 99213 OFFICE O/P EST LOW 20 MIN: CPT | Performed by: INTERNAL MEDICINE

## 2021-11-24 RX ORDER — TRAZODONE HYDROCHLORIDE 50 MG/1
50 TABLET ORAL
COMMUNITY
Start: 2021-11-22

## 2021-11-24 RX ORDER — CITALOPRAM 10 MG/1
10 TABLET ORAL DAILY
COMMUNITY
Start: 2021-11-11

## 2021-11-24 NOTE — PROGRESS NOTES
Abdirahman Bro Vinay  1960  [phone]      11/24/2021    [facility] CARDIOLOGY     Dallas Crespo MD  [address] [place] [address] [facility]  [place] [id number]    Chief Complaint   Patient presents with   • Nonischemic Cardiomyopathy   • Chronic Systolic Congestive Heart Failure       Problem List:     1.  Nonischemic Cardiomyopathy  1. CHF acute EF per [facility] reportedly <30%  2. 12/16 bnp 480  3. 2008 [facility] wnl EF 60  4. 2/17 [facility] nl cors EF 20%  5. 6/27/17 SJM ICD implant  6. ER 2/2019 NSR/ST, PAC  7. Echo 2/2019 LVEF 35-40%  8. Echo 3/4/2020: 35%  2. DM -A1c11- 8.3 2018   Onset 2011  3. HTN  4. HL-on statin  1. 2016 total chol 104  5. TAMAR- cpap intolerant  6. Palpitations/PAF:  1. 12/16 holter <1% pvc, <1% pac   2. Noted short episodes on device interrogation   7. Chronically elevated cpk 4-500      Allergies  Allergies   Allergen Reactions   • Other GI Intolerance     All pain medications cause constipation       Current Medications    Current Outpatient Medications:   •  allopurinol (ZYLOPRIM) 100 MG tablet, Take 100 mg by mouth Daily., Disp: , Rfl:   •  aspirin 81 MG chewable tablet, Chew 81 mg Daily., Disp: , Rfl:   •  carvedilol (COREG) 12.5 MG tablet, TAKE ONE TABLET BY MOUTH EVERY DAY, Disp: 90 tablet, Rfl: 3  •  cetirizine (zyrTEC) 10 MG tablet, Take 10 mg by mouth Daily., Disp: , Rfl:   •  citalopram (CeleXA) 10 MG tablet, Take 10 mg by mouth Daily., Disp: , Rfl:   •  Comfort EZ Pen Needles 32G X 4 MM misc, USE AS DIRECTED EVERY DAY, Disp: 100 each, Rfl: 3  •  Continuous Blood Gluc  (FreeStyle Jenifer 2 [place]) device, 1 each Daily., Disp: 1 each, Rfl: 0  •  Continuous Blood Gluc Sensor (FreeStyle Jenifer 2 Sensor) misc, 1 each Every 14 (Fourteen) Days., Disp: 2 each, Rfl: 5  •  Easy Touch Test test strip, 2 (Two) Times a Day. as directed, Disp: , Rfl:   •  Empagliflozin (Jardiance) 10 MG tablet, Take 10 mg by mouth Daily., Disp: 30 tablet, Rfl: 5  •  insulin detemir (Levemir  FlexTouch) 100 UNIT/ML injection, Inject 60 Units under the skin into the appropriate area as directed Daily. Adjust to fasting FSBS <120; max daily dose 70u, Disp: 21 mL, Rfl: 3  •  Insulin Glargine, 1 Unit Dial, (Toujeo SoloStar) 300 UNIT/ML solution pen-injector injection, INJECT 60 UNITS UNDER THE SKIN INTO THE APPROPRIATE AREA AS DIRECTED DAILY. Adjust to fasting FSBS <120; MAX DAILY DOSE 70u, Disp: 21 mL, Rfl: 3  •  linagliptin (Tradjenta) 5 MG tablet tablet, Take 1 tablet by mouth Daily., Disp: 90 tablet, Rfl: 3  •  metFORMIN ER (GLUCOPHAGE-XR) 500 MG 24 hr tablet, Take 2 tablets by mouth 2 (two) times a day., Disp: 360 tablet, Rfl: 3  •  montelukast (SINGULAIR) 10 MG tablet, Take 10 mg by mouth Every Night., Disp: , Rfl:   •  pantoprazole (PROTONIX) 40 MG EC tablet, Take 40 mg by mouth Daily., Disp: , Rfl:   •  rosuvastatin (Crestor) 10 MG tablet, Take 10 mg by mouth Daily., Disp: , Rfl:   •  sacubitril-valsartan (Entresto) 24-26 MG tablet, Take 1 tablet by mouth 2 (Two) Times a Day., Disp: 60 tablet, Rfl: 6  •  spironolactone (ALDACTONE) 50 MG tablet, Take 50 mg by mouth Daily., Disp: , Rfl:   •  tamsulosin (FLOMAX) 0.4 MG capsule 24 hr capsule, Take 1 capsule by mouth Every Night., Disp: , Rfl:   •  torsemide (DEMADEX) 20 MG tablet, Pt has been taking 5mg daily, Disp: 45 tablet, Rfl: 6  •  traZODone (DESYREL) 50 MG tablet, Take 50 mg by mouth every night at bedtime., Disp: , Rfl:   •  TRUEplus Lancets 30G misc, USE TO CHECK BLOOD GLUCOSE LEVEL TWICE DAILY, Disp: , Rfl:     History of Present Illness     Pt presents for follow up of CHF/HTN. Since the pt has seen us, pt denies any palpitations, CP, LH, and dizziness. Denies any hospitalizations, ER visits, ICD shocks, or TIA/CVA symptoms. Overall feels well. No side effects to medications. BP stable at home. + chronic mild MIRAMONTES    ROS:  General: + fatigue, No weight gain or loss  Cardiovascular:  Denies CP, PND, syncope, near syncope, edema or  "palpitations.  Pulmonary:  + MIRAMONTES, No cough, or wheezing    Vitals:    11/24/21 1042   BP: 128/88   BP Location: Left arm   Patient Position: Sitting   Cuff Size: Adult   Pulse: 81   SpO2: 95%   Weight: 116 kg (255 lb 9.6 oz)   Height: 175.3 cm (69\")       PE:  General: NAD  Neck: no JVD, no carotid bruits, no TM  Heart RRR, NL S1, S2, S4 present, no rubs, murmurs  Lungs: CTA, no wheezes, rhonchi, or rales  Abd: soft, non-tender, NL BS  Ext: No musculoskeletal deformities, no edema, cyanosis, or clubbing  Psych: normal mood and affect    Diagnostic Data:  Procedures.    1. Non-ischemic cardiomyopathy (HCC)    2. Chronic systolic congestive heart failure, NYHA class 3 (HCC)    3. Essential hypertension        ICD interrogation: NL fxn, NL battery fxn, No AF, <1% RV paced, NSVT    Plan:  1. NICM/CHF: on appropriate meds: no changes for now  - EF 20% in 2017, now LVEF 35%   - ICD function normal  - Class II symptoms, doing well on Entresto with improvement of symptoms, continue Entresto 24/26 mg BID     2. HTN:   -BP well controlled, continue medications    F/up in 12 months         "

## 2021-12-14 RX ORDER — EMPAGLIFLOZIN 10 MG/1
TABLET, FILM COATED ORAL
Qty: 90 TABLET | Refills: 2 | Status: SHIPPED | OUTPATIENT
Start: 2021-12-14 | End: 2022-09-15

## 2022-01-11 RX ORDER — SACUBITRIL AND VALSARTAN 24; 26 MG/1; MG/1
TABLET, FILM COATED ORAL
Qty: 60 TABLET | Refills: 6 | Status: SHIPPED | OUTPATIENT
Start: 2022-01-11 | End: 2022-02-10 | Stop reason: SDUPTHER

## 2022-01-23 PROCEDURE — 93295 DEV INTERROG REMOTE 1/2/MLT: CPT | Performed by: INTERNAL MEDICINE

## 2022-01-23 PROCEDURE — 93296 REM INTERROG EVL PM/IDS: CPT | Performed by: INTERNAL MEDICINE

## 2022-02-09 NOTE — PROGRESS NOTES
[facility] Cardiology  Office Progress Note  Abdirahman Mclean  1960  [address] [place] [address]       Visit Date: 02/10/22    PCP: Dallas Crespo MD  [address] [place] [facility] [address] [address]    IDENTIFICATION: A 61 y.o. 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.    PROBLEM LIST:   1.  Nonischemic Cardiomyopathy  1. 2008 [facility] wnl EF 60  2. 2/17 [facility] nl cors EF 20%  3. 6/27/17 SJM ICD implant  4. ER 2/2019 NSR/ST, PAC  5. Echo 2/2019 LVEF 35-40%  6. Echo 3/4/2020: 35%  2. DM -A1c11- 8.3 2018              Onset 2011   1.  7/21 A1c 6.5   2.  11/21 A1c 6.7  3. HTN  4. HL-on statin  1. 12/20 117/233/34/46  5. TAMAR- cpap intolerant  6. Palpitations/PAF:  1. 12/16 holter <1% pvc, <1% pac   2. Noted short episodes on device interrogation   7. Chronically elevated cpk 4-500        CC:   Chief Complaint   Patient presents with   • Chronic systolic congestive heart failure (CMS/HCC)       Allergies  Allergies   Allergen Reactions   • Other GI Intolerance     All pain medications cause constipation       Current Medications    Current Outpatient Medications:   •  allopurinol (ZYLOPRIM) 100 MG tablet, Take 100 mg by mouth Daily., Disp: , Rfl:   •  aspirin 81 MG chewable tablet, Chew 81 mg Daily., Disp: , Rfl:   •  busPIRone (BUSPAR) 5 MG tablet, Take 5 mg by mouth 2 (Two) Times a Day., Disp: , Rfl:   •  carvedilol (COREG) 12.5 MG tablet, TAKE ONE TABLET BY MOUTH EVERY DAY, Disp: 90 tablet, Rfl: 3  •  cetirizine (zyrTEC) 10 MG tablet, Take 10 mg by mouth Daily., Disp: , Rfl:   •  citalopram (CeleXA) 10 MG tablet, Take 10 mg by mouth Daily., Disp: , Rfl:   •  Comfort EZ Pen Needles 32G X 4 MM misc, USE AS DIRECTED EVERY DAY, Disp: 100 each, Rfl: 3  •  Continuous Blood Gluc  (FreeStyle Jenifer 2 [place]) device, 1 each Daily., Disp: 1 each, Rfl: 0  •  Continuous Blood Gluc Sensor (FreeStyle Jenifer 2 Sensor) misc, 1 each Every 14 (Fourteen) Days., Disp:  2 each, Rfl: 5  •  Easy Touch Test test strip, 2 (Two) Times a Day. as directed, Disp: , Rfl:   •  Entresto 24-26 MG tablet, TAKE ONE TABLET BY MOUTH TWICE DAILY, Disp: 60 tablet, Rfl: 6  •  insulin detemir (Levemir FlexTouch) 100 UNIT/ML injection, Inject 60 Units under the skin into the appropriate area as directed Daily. Adjust to fasting FSBS <120; max daily dose 70u, Disp: 21 mL, Rfl: 3  •  Jardiance 10 MG tablet tablet, TAKE ONE TABLET BY MOUTH EVERY DAY, Disp: 90 tablet, Rfl: 2  •  linagliptin (Tradjenta) 5 MG tablet tablet, Take 1 tablet by mouth Daily., Disp: 90 tablet, Rfl: 3  •  metFORMIN ER (GLUCOPHAGE-XR) 500 MG 24 hr tablet, Take 2 tablets by mouth 2 (two) times a day., Disp: 360 tablet, Rfl: 3  •  montelukast (SINGULAIR) 10 MG tablet, Take 10 mg by mouth Every Night., Disp: , Rfl:   •  pantoprazole (PROTONIX) 40 MG EC tablet, Take 40 mg by mouth Daily., Disp: , Rfl:   •  rosuvastatin (Crestor) 10 MG tablet, Take 10 mg by mouth Daily., Disp: , Rfl:   •  spironolactone (ALDACTONE) 50 MG tablet, Take 50 mg by mouth Daily., Disp: , Rfl:   •  tamsulosin (FLOMAX) 0.4 MG capsule 24 hr capsule, Take 1 capsule by mouth Every Night., Disp: , Rfl:   •  torsemide (DEMADEX) 20 MG tablet, Pt has been taking 5mg daily, Disp: 45 tablet, Rfl: 6  •  traZODone (DESYREL) 50 MG tablet, Take 50 mg by mouth every night at bedtime., Disp: , Rfl:   •  TRUEplus Lancets 30G misc, USE TO CHECK BLOOD GLUCOSE LEVEL TWICE DAILY, Disp: , Rfl:   •  Insulin Glargine, 1 Unit Dial, (Toujeo SoloStar) 300 UNIT/ML solution pen-injector injection, INJECT 60 UNITS UNDER THE SKIN INTO THE APPROPRIATE AREA AS DIRECTED DAILY. Adjust to fasting FSBS <120; MAX DAILY DOSE 70u, Disp: 21 mL, Rfl: 3      History of Present Illness   Abdirahman Mclean is a 61 y.o. year old male here for follow up.    Pt denies any chest pain, dyspnea, dyspnea on exertion, orthopnea, PND, palpitations, lower extremity edema, or claudication.  Unfortunately slipped on  "the ice injuring his tailbone recently.        OBJECTIVE:  Vitals:    02/10/22 0918   BP: 116/82   BP Location: Right arm   Patient Position: Sitting   Pulse: 71   SpO2: 95%   Weight: 114 kg (252 lb)   Height: 175.3 cm (69\")     Body mass index is 37.21 kg/m².    Constitutional:       Appearance: Healthy appearance. Not in distress.   Neck:      Vascular: No JVR. JVD normal.   Pulmonary:      Effort: Pulmonary effort is normal.      Breath sounds: Normal breath sounds. No wheezing. No rhonchi. No rales.   Chest:      Chest wall: Not tender to palpatation.   Cardiovascular:      PMI at left midclavicular line. ICD CDI Normal rate. Regular rhythm. Normal S1. Normal S2.      Murmurs: There is no murmur.      No gallop. No click. No rub.   Pulses:     Intact distal pulses.   Edema:     Peripheral edema absent.   Abdominal:      General: Bowel sounds are normal.      Palpations: Abdomen is soft.      Tenderness: There is no abdominal tenderness.      Comments: obese   Musculoskeletal: Normal range of motion.         General: No tenderness. Skin:     General: Skin is warm and dry.   Neurological:      General: No focal deficit present.      Mental Status: Alert and oriented to person, place and time.         Diagnostic Data:    ECG 12 Lead    Date/Time: 2/10/2022 9:36 AM  Performed by: Danny Licona MD  Authorized by: Danny Licona MD   Previous ECG: no previous ECG available  BPM: 67  Other findings: left ventricular hypertrophy    Clinical impression: abnormal EKG              ASSESSMENT:   Diagnosis Plan   1. Acute on chronic combined systolic and diastolic CHF (congestive heart failure) (HCC)  Adult Transthoracic Echo Complete W/ Cont if Necessary Per Protocol   2. Primary hypertension     3. Mixed hyperlipidemia     4. Type 2 diabetes mellitus with hyperglycemia, without long-term current use of insulin (HCC)         PLAN:  CHF nonischemic on guideline directed medication.  Recent event noted on 1/14/2022 that " was paced terminated.  With document echocardiogram his nearest convenience he has appointment in May with electrophysiology so for geographic convenience we will document at that time    Hypertension controlled tamsulosin Entresto carvedilol    Mixed dyslipidemia controlled on statin therapy    Diabetes with fasting blood sugars less than 130 he states at home.  Given LV dysfunction concerned with Metformin with any substrate for acidotic state          Danny Licona MD, FACC

## 2022-02-10 ENCOUNTER — OFFICE VISIT (OUTPATIENT)
Dept: CARDIOLOGY | Facility: CLINIC | Age: 62
End: 2022-02-10

## 2022-02-10 VITALS
WEIGHT: 252 LBS | HEIGHT: 69 IN | HEART RATE: 71 BPM | SYSTOLIC BLOOD PRESSURE: 116 MMHG | OXYGEN SATURATION: 95 % | DIASTOLIC BLOOD PRESSURE: 82 MMHG | BODY MASS INDEX: 37.33 KG/M2

## 2022-02-10 DIAGNOSIS — I10 PRIMARY HYPERTENSION: ICD-10-CM

## 2022-02-10 DIAGNOSIS — E11.65 TYPE 2 DIABETES MELLITUS WITH HYPERGLYCEMIA, WITHOUT LONG-TERM CURRENT USE OF INSULIN: ICD-10-CM

## 2022-02-10 DIAGNOSIS — E78.2 MIXED HYPERLIPIDEMIA: ICD-10-CM

## 2022-02-10 DIAGNOSIS — I50.43 ACUTE ON CHRONIC COMBINED SYSTOLIC AND DIASTOLIC CHF (CONGESTIVE HEART FAILURE): Primary | ICD-10-CM

## 2022-02-10 PROCEDURE — 99214 OFFICE O/P EST MOD 30 MIN: CPT | Performed by: INTERNAL MEDICINE

## 2022-02-10 PROCEDURE — 93000 ELECTROCARDIOGRAM COMPLETE: CPT | Performed by: INTERNAL MEDICINE

## 2022-02-10 RX ORDER — ROSUVASTATIN CALCIUM 10 MG/1
10 TABLET, COATED ORAL DAILY
Qty: 90 TABLET | Refills: 3 | Status: SHIPPED | OUTPATIENT
Start: 2022-02-10 | End: 2022-12-08 | Stop reason: SDUPTHER

## 2022-02-10 RX ORDER — SACUBITRIL AND VALSARTAN 24; 26 MG/1; MG/1
1 TABLET, FILM COATED ORAL 2 TIMES DAILY
Qty: 60 TABLET | Refills: 6 | Status: SHIPPED | OUTPATIENT
Start: 2022-02-10 | End: 2022-12-08 | Stop reason: SDUPTHER

## 2022-02-10 RX ORDER — SPIRONOLACTONE 50 MG/1
50 TABLET, FILM COATED ORAL DAILY
Qty: 90 TABLET | Refills: 3 | Status: SHIPPED | OUTPATIENT
Start: 2022-02-10 | End: 2022-12-08 | Stop reason: SDUPTHER

## 2022-02-10 RX ORDER — TORSEMIDE 5 MG/1
5 TABLET ORAL DAILY
Qty: 90 TABLET | Refills: 3 | Status: SHIPPED | OUTPATIENT
Start: 2022-02-10 | End: 2022-12-08 | Stop reason: SDUPTHER

## 2022-02-10 RX ORDER — TORSEMIDE 20 MG/1
TABLET ORAL
Qty: 45 TABLET | Refills: 6 | Status: SHIPPED | OUTPATIENT
Start: 2022-02-10 | End: 2022-02-10 | Stop reason: ALTCHOICE

## 2022-02-10 RX ORDER — BUSPIRONE HYDROCHLORIDE 5 MG/1
5 TABLET ORAL 2 TIMES DAILY
COMMUNITY
Start: 2022-02-03 | End: 2022-05-25

## 2022-02-10 RX ORDER — CARVEDILOL 12.5 MG/1
12.5 TABLET ORAL DAILY
Qty: 90 TABLET | Refills: 3 | Status: SHIPPED | OUTPATIENT
Start: 2022-02-10 | End: 2022-12-08 | Stop reason: SDUPTHER

## 2022-02-28 ENCOUNTER — HOSPITAL ENCOUNTER (OUTPATIENT)
Dept: CARDIOLOGY | Facility: HOSPITAL | Age: 62
Discharge: HOME | End: 2022-02-28
Admitting: INTERNAL MEDICINE

## 2022-02-28 VITALS — WEIGHT: 252 LBS | BODY MASS INDEX: 37.33 KG/M2 | HEIGHT: 69 IN

## 2022-02-28 DIAGNOSIS — I50.43 ACUTE ON CHRONIC COMBINED SYSTOLIC AND DIASTOLIC CHF (CONGESTIVE HEART FAILURE): ICD-10-CM

## 2022-02-28 LAB
BH CV ECHO MEAS - % IVS THICK: 9 %
BH CV ECHO MEAS - % LVPW THICK: 70.4 %
BH CV ECHO MEAS - ASC AORTA: 3 CM
BH CV ECHO MEAS - BSA(HAYCOCK): 2.4 M^2
BH CV ECHO MEAS - BSA: 2.3 M^2
BH CV ECHO MEAS - BZI_BMI: 37.2 KILOGRAMS/M^2
BH CV ECHO MEAS - BZI_METRIC_HEIGHT: 175.3 CM
BH CV ECHO MEAS - BZI_METRIC_WEIGHT: 114.3 KG
BH CV ECHO MEAS - EDV(CUBED): 267.9 ML
BH CV ECHO MEAS - EDV(MOD-SP2): 158.6 ML
BH CV ECHO MEAS - EDV(MOD-SP4): 244.3 ML
BH CV ECHO MEAS - EDV(TEICH): 212 ML
BH CV ECHO MEAS - EF(CUBED): 42.4 %
BH CV ECHO MEAS - EF(MOD-BP): 35 %
BH CV ECHO MEAS - EF(MOD-SP2): 40.6 %
BH CV ECHO MEAS - EF(MOD-SP4): 28.1 %
BH CV ECHO MEAS - EF(TEICH): 34.3 %
BH CV ECHO MEAS - ESV(CUBED): 154.4 ML
BH CV ECHO MEAS - ESV(MOD-SP2): 94.1 ML
BH CV ECHO MEAS - ESV(MOD-SP4): 175.6 ML
BH CV ECHO MEAS - ESV(TEICH): 139.2 ML
BH CV ECHO MEAS - FS: 16.8 %
BH CV ECHO MEAS - IVS/LVPW: 1.2
BH CV ECHO MEAS - IVSD: 1.2 CM
BH CV ECHO MEAS - IVSS: 1.3 CM
BH CV ECHO MEAS - LA DIMENSION: 3.5 CM
BH CV ECHO MEAS - LV DIASTOLIC VOL/BSA (35-75): 107.2 ML/M^2
BH CV ECHO MEAS - LV IVRT: 0.08 SEC
BH CV ECHO MEAS - LV MASS(C)D: 328.6 GRAMS
BH CV ECHO MEAS - LV MASS(C)DI: 144.2 GRAMS/M^2
BH CV ECHO MEAS - LV MASS(C)S: 379.5 GRAMS
BH CV ECHO MEAS - LV MASS(C)SI: 166.5 GRAMS/M^2
BH CV ECHO MEAS - LV MAX PG: 1.4 MMHG
BH CV ECHO MEAS - LV MEAN PG: 0.86 MMHG
BH CV ECHO MEAS - LV SYSTOLIC VOL/BSA (12-30): 77.1 ML/M^2
BH CV ECHO MEAS - LV V1 MAX: 59.1 CM/SEC
BH CV ECHO MEAS - LV V1 MEAN: 43.6 CM/SEC
BH CV ECHO MEAS - LV V1 VTI: 12.9 CM
BH CV ECHO MEAS - LVIDD: 6.4 CM
BH CV ECHO MEAS - LVIDS: 5.4 CM
BH CV ECHO MEAS - LVOT AREA: 3.5 CM^2
BH CV ECHO MEAS - LVOT DIAM: 2.1 CM
BH CV ECHO MEAS - LVPWD: 1 CM
BH CV ECHO MEAS - LVPWS: 1.8 CM
BH CV ECHO MEAS - MV A MAX VEL: 71.2 CM/SEC
BH CV ECHO MEAS - MV DEC SLOPE: 264 CM/SEC^2
BH CV ECHO MEAS - MV DEC TIME: 0.21 SEC
BH CV ECHO MEAS - MV E MAX VEL: 52.6 CM/SEC
BH CV ECHO MEAS - MV E/A: 0.74
BH CV ECHO MEAS - MV MAX PG: 2.2 MMHG
BH CV ECHO MEAS - MV MEAN PG: 0.92 MMHG
BH CV ECHO MEAS - MV V2 MAX: 73.9 CM/SEC
BH CV ECHO MEAS - MV V2 MEAN: 45.1 CM/SEC
BH CV ECHO MEAS - MV V2 VTI: 24 CM
BH CV ECHO MEAS - MVA(VTI): 1.9 CM^2
BH CV ECHO MEAS - PA ACC TIME: 0.1 SEC
BH CV ECHO MEAS - PA PR(ACCEL): 32.8 MMHG
BH CV ECHO MEAS - SI(CUBED): 49.8 ML/M^2
BH CV ECHO MEAS - SI(LVOT): 19.6 ML/M^2
BH CV ECHO MEAS - SI(MOD-SP2): 28.3 ML/M^2
BH CV ECHO MEAS - SI(MOD-SP4): 30.1 ML/M^2
BH CV ECHO MEAS - SI(TEICH): 31.9 ML/M^2
BH CV ECHO MEAS - SV(CUBED): 113.5 ML
BH CV ECHO MEAS - SV(LVOT): 44.6 ML
BH CV ECHO MEAS - SV(MOD-SP2): 64.5 ML
BH CV ECHO MEAS - SV(MOD-SP4): 68.6 ML
BH CV ECHO MEAS - SV(TEICH): 72.8 ML
BH CV ECHO MEAS - TAPSE (>1.6): 1.4 CM
BH CV ECHO MEAS - TR MAX PG: 5.7 MMHG
BH CV ECHO MEAS - TR MAX VEL: 119.6 CM/SEC
BH CV VAS BP LEFT ARM: NORMAL MMHG
BH CV XLRA - RV BASE: 3.4 CM
BH CV XLRA - RV LENGTH: 5.3 CM
BH CV XLRA - RV MID: 2.8 CM
BH CV XLRA - TDI S': 9 CM/SEC
LEFT ATRIUM VOLUME INDEX: 26.4 ML/M^2
LEFT ATRIUM VOLUME: 60.1 ML
LV EF 2D ECHO EST: 35 %
MAXIMAL PREDICTED HEART RATE: 157 BPM
STRESS TARGET HR: 133 BPM

## 2022-02-28 PROCEDURE — 25010000002 SULFUR HEXAFLUORIDE MICROSPH 60.7-25 MG RECONSTITUTED SUSPENSION: Performed by: INTERNAL MEDICINE

## 2022-02-28 PROCEDURE — 93306 TTE W/DOPPLER COMPLETE: CPT | Performed by: INTERNAL MEDICINE

## 2022-02-28 PROCEDURE — 93306 TTE W/DOPPLER COMPLETE: CPT

## 2022-02-28 RX ADMIN — SULFUR HEXAFLUORIDE 3 ML: KIT at 09:41

## 2022-03-23 ENCOUNTER — DOCUMENTATION (OUTPATIENT)
Dept: ENDOCRINOLOGY | Facility: CLINIC | Age: 62
End: 2022-03-23

## 2022-03-23 NOTE — PROGRESS NOTES
Patient not eligible to fill specialty medication at [facility]. Reason:     Pt has [place] Medicaid and must sign for prescriptions upon delivery which makes coordination difficult    Marika Francis CPhT  Pharmacy Care Coordinator  3/23/2022  10:31 EDT

## 2022-04-05 ENCOUNTER — OFFICE VISIT (OUTPATIENT)
Dept: ENDOCRINOLOGY | Facility: CLINIC | Age: 62
End: 2022-04-05

## 2022-04-05 ENCOUNTER — LAB (OUTPATIENT)
Dept: LAB | Facility: HOSPITAL | Age: 62
End: 2022-04-05

## 2022-04-05 VITALS
DIASTOLIC BLOOD PRESSURE: 80 MMHG | HEIGHT: 69 IN | BODY MASS INDEX: 37.33 KG/M2 | OXYGEN SATURATION: 98 % | WEIGHT: 252 LBS | HEART RATE: 75 BPM | SYSTOLIC BLOOD PRESSURE: 124 MMHG

## 2022-04-05 DIAGNOSIS — Z79.4 TYPE 2 DIABETES MELLITUS WITH HYPERGLYCEMIA, WITH LONG-TERM CURRENT USE OF INSULIN: ICD-10-CM

## 2022-04-05 DIAGNOSIS — E11.65 TYPE 2 DIABETES MELLITUS WITH HYPERGLYCEMIA, WITH LONG-TERM CURRENT USE OF INSULIN: ICD-10-CM

## 2022-04-05 DIAGNOSIS — I42.8 NON-ISCHEMIC CARDIOMYOPATHY: ICD-10-CM

## 2022-04-05 DIAGNOSIS — I10 ESSENTIAL HYPERTENSION: ICD-10-CM

## 2022-04-05 DIAGNOSIS — Z79.4 INSULIN LONG-TERM USE: ICD-10-CM

## 2022-04-05 DIAGNOSIS — Z79.4 TYPE 2 DIABETES MELLITUS WITH HYPERGLYCEMIA, WITH LONG-TERM CURRENT USE OF INSULIN: Primary | ICD-10-CM

## 2022-04-05 DIAGNOSIS — E78.5 HYPERLIPIDEMIA LDL GOAL <100: ICD-10-CM

## 2022-04-05 DIAGNOSIS — E11.65 TYPE 2 DIABETES MELLITUS WITH HYPERGLYCEMIA, WITH LONG-TERM CURRENT USE OF INSULIN: Primary | ICD-10-CM

## 2022-04-05 LAB
EXPIRATION DATE: ABNORMAL
EXPIRATION DATE: NORMAL
GLUCOSE BLDC GLUCOMTR-MCNC: 163 MG/DL (ref 70–130)
HBA1C MFR BLD: 6.7 %
Lab: ABNORMAL
Lab: NORMAL

## 2022-04-05 PROCEDURE — 82043 UR ALBUMIN QUANTITATIVE: CPT

## 2022-04-05 PROCEDURE — 3044F HG A1C LEVEL LT 7.0%: CPT | Performed by: INTERNAL MEDICINE

## 2022-04-05 PROCEDURE — 82947 ASSAY GLUCOSE BLOOD QUANT: CPT | Performed by: INTERNAL MEDICINE

## 2022-04-05 PROCEDURE — 84443 ASSAY THYROID STIM HORMONE: CPT

## 2022-04-05 PROCEDURE — 83036 HEMOGLOBIN GLYCOSYLATED A1C: CPT | Performed by: INTERNAL MEDICINE

## 2022-04-05 PROCEDURE — 99214 OFFICE O/P EST MOD 30 MIN: CPT | Performed by: INTERNAL MEDICINE

## 2022-04-05 PROCEDURE — 80053 COMPREHEN METABOLIC PANEL: CPT

## 2022-04-05 PROCEDURE — 82570 ASSAY OF URINE CREATININE: CPT

## 2022-04-05 PROCEDURE — 80061 LIPID PANEL: CPT

## 2022-04-05 NOTE — PROGRESS NOTES
"     Office Note      Date: 2022  Patient Name: Abdirahman Mclean  MRN: [id number]  : 1960    Chief Complaint   Patient presents with   • Diabetes     Type II       History of Present Illness:   Abdirahman Mclean is a 61 y.o. male who presents for Diabetes type 2. Diagnosed in: . Treated in past with oral agents. Current treatments: tradjenta, metformin, jardiance and basal insulin. Number of insulin shots per day: 1. Checks blood sugar 2 times a day. Has low blood sugar: no. Aspirin use: Yes. Statin use: Yes. ACE-I/ARB use: Yes. Changes in health since last visit: none. Last eye exam: 10/2021.    Subjective      Diabetic Complications:  Eyes: No  Kidneys: No  Feet: No  Heart: Yes - cardiomyopathy    Diet and Exercise:  Meals per day: 2  Minutes of exercise per week: 70 mins.    Review of Systems:   Review of Systems   Constitutional: Negative.    Cardiovascular: Negative.    Gastrointestinal: Negative.    Endocrine: Negative.        The following portions of the patient's history were reviewed and updated as appropriate: allergies, current medications, past family history, past medical history, past social history, past surgical history and problem list.    Objective       Visit Vitals  /80 (BP Location: Left arm, Patient Position: Sitting, Cuff Size: Adult)   Pulse 75   Ht 175.3 cm (69\")   Wt 114 kg (252 lb)   SpO2 98%   BMI 37.21 kg/m²       Physical Exam:  Physical Exam  Constitutional:       Appearance: Normal appearance.   Cardiovascular:      Pulses:           Dorsalis pedis pulses are 2+ on the right side and 2+ on the left side.        Posterior tibial pulses are 2+ on the right side and 2+ on the left side.   Feet:      Right foot:      Protective Sensation: 5 sites tested. 5 sites sensed.      Skin integrity: Skin integrity normal.      Toenail Condition: Right toenails are abnormally thick.      Left foot:      Protective Sensation: 5 sites tested. 5 sites sensed.      Skin " integrity: Skin integrity normal.      Toenail Condition: Left toenails are abnormally thick.   Neurological:      Mental Status: He is alert.         Labs:    HbA1c  Lab Results   Component Value Date    HGBA1C 6.7 04/05/2022       CMP  Lab Results   Component Value Date    GLUCOSE 121 (H) 12/15/2020    BUN 15 12/15/2020    CREATININE 0.87 12/15/2020    EGFRIFNONA 89 12/15/2020    BCR 17.2 12/15/2020    K 4.4 12/15/2020    CO2 25.2 12/15/2020    CALCIUM 9.3 12/15/2020    AST 23 12/15/2020    ALT 27 12/15/2020        Lipid Panel  Lab Results   Component Value Date    HDL 34 (L) 12/15/2020    LDL 46 12/15/2020    TRIG 233 (H) 12/15/2020        TSH  Lab Results   Component Value Date    TSH 1.690 12/15/2020        Hemoglobin A1C  Lab Results   Component Value Date    HGBA1C 6.7 04/05/2022        Microalbumin/Creatinine  Lab Results   Component Value Date    MALBCRERATIO 22.2 12/15/2020    MICROALBUR 1.3 12/15/2020           Assessment / Plan      Assessment & Plan:  Diagnoses and all orders for this visit:    1. Type 2 diabetes mellitus with hyperglycemia, with long-term current use of insulin (HCC) (Primary)  Assessment & Plan:  Diabetes is unchanged.   Continue current treatment regimen.  Diabetes will be reassessed in 3 months.    Orders:  -     POC Glucose, Blood  -     POC Glycosylated Hemoglobin (Hb A1C)  -     Comprehensive Metabolic Panel; Future  -     Lipid Panel; Future  -     Microalbumin / Creatinine Urine Ratio - Urine, Clean Catch; Future  -     TSH; Future    2. Essential hypertension  Assessment & Plan:  Hypertension is unchanged.  Continue current treatment regimen.  Blood pressure will be reassessed at the next regular appointment.      3. Hyperlipidemia LDL goal <100  Assessment & Plan:  Continue statin.  Check lipids today.      4. Non-ischemic cardiomyopathy (HCC)  Assessment & Plan:  Continue SGLT-2 inhibitor.      5. Insulin long-term use (HCC)      Return in about 3 months (around 7/5/2022)  for Recheck with A1c.    Hugo Andrade MD   04/05/2022

## 2022-04-06 LAB
ALBUMIN SERPL-MCNC: 4.7 G/DL (ref 3.5–5.2)
ALBUMIN UR-MCNC: <1.2 MG/DL
ALBUMIN/GLOB SERPL: 1.5 G/DL
ALP SERPL-CCNC: 107 U/L (ref 39–117)
ALT SERPL W P-5'-P-CCNC: 32 U/L (ref 1–41)
ANION GAP SERPL CALCULATED.3IONS-SCNC: 14 MMOL/L (ref 5–15)
AST SERPL-CCNC: 23 U/L (ref 1–40)
BILIRUB SERPL-MCNC: 0.7 MG/DL (ref 0–1.2)
BUN SERPL-MCNC: 10 MG/DL (ref 8–23)
BUN/CREAT SERPL: 12 (ref 7–25)
CALCIUM SPEC-SCNC: 8.9 MG/DL (ref 8.6–10.5)
CHLORIDE SERPL-SCNC: 101 MMOL/L (ref 98–107)
CHOLEST SERPL-MCNC: 127 MG/DL (ref 0–200)
CO2 SERPL-SCNC: 24 MMOL/L (ref 22–29)
CREAT SERPL-MCNC: 0.83 MG/DL (ref 0.76–1.27)
CREAT UR-MCNC: 21 MG/DL
EGFRCR SERPLBLD CKD-EPI 2021: 98.3 ML/MIN/1.73
GLOBULIN UR ELPH-MCNC: 3.2 GM/DL
GLUCOSE SERPL-MCNC: 124 MG/DL (ref 65–99)
HDLC SERPL-MCNC: 33 MG/DL (ref 40–60)
LDLC SERPL CALC-MCNC: 45 MG/DL (ref 0–100)
LDLC/HDLC SERPL: 0.89 {RATIO}
MICROALBUMIN/CREAT UR: NORMAL MG/G{CREAT}
POTASSIUM SERPL-SCNC: 4.2 MMOL/L (ref 3.5–5.2)
PROT SERPL-MCNC: 7.9 G/DL (ref 6–8.5)
SODIUM SERPL-SCNC: 139 MMOL/L (ref 136–145)
TRIGL SERPL-MCNC: 323 MG/DL (ref 0–150)
TSH SERPL DL<=0.05 MIU/L-ACNC: 2.26 UIU/ML (ref 0.27–4.2)
VLDLC SERPL-MCNC: 49 MG/DL (ref 5–40)

## 2022-05-20 ENCOUNTER — AMBULATORY SURGICAL CENTER (OUTPATIENT)
Dept: URBAN - METROPOLITAN AREA SURGERY 10 | Facility: SURGERY | Age: 62
End: 2022-05-20
Payer: COMMERCIAL

## 2022-05-20 ENCOUNTER — OFFICE (OUTPATIENT)
Dept: URBAN - METROPOLITAN AREA PATHOLOGY 4 | Facility: PATHOLOGY | Age: 62
End: 2022-05-20
Payer: COMMERCIAL

## 2022-05-20 DIAGNOSIS — K64.1 SECOND DEGREE HEMORRHOIDS: ICD-10-CM

## 2022-05-20 DIAGNOSIS — Z12.11 ENCOUNTER FOR SCREENING FOR MALIGNANT NEOPLASM OF COLON: ICD-10-CM

## 2022-05-20 DIAGNOSIS — D12.4 BENIGN NEOPLASM OF DESCENDING COLON: ICD-10-CM

## 2022-05-20 DIAGNOSIS — Z86.010 PERSONAL HISTORY OF COLONIC POLYPS: ICD-10-CM

## 2022-05-20 PROCEDURE — 88305 TISSUE EXAM BY PATHOLOGIST: CPT | Performed by: INTERNAL MEDICINE

## 2022-05-20 PROCEDURE — 45385 COLONOSCOPY W/LESION REMOVAL: CPT | Mod: PT | Performed by: INTERNAL MEDICINE

## 2022-05-23 PROBLEM — Z86.010 PERSONAL HISTORY OF COLON POLYPS: Status: ACTIVE | Noted: 2022-05-23

## 2022-05-23 PROBLEM — Z12.11 SCREENING FOR COLONIC NEOPLASIA: Status: ACTIVE | Noted: 2022-05-23

## 2022-05-24 RX ORDER — METFORMIN HYDROCHLORIDE 500 MG/1
TABLET, EXTENDED RELEASE ORAL
Qty: 360 TABLET | Refills: 3 | Status: SHIPPED | OUTPATIENT
Start: 2022-05-24 | End: 2023-01-12 | Stop reason: SDUPTHER

## 2022-05-25 ENCOUNTER — OFFICE VISIT (OUTPATIENT)
Dept: CARDIOLOGY | Facility: CLINIC | Age: 62
End: 2022-05-25

## 2022-05-25 VITALS
OXYGEN SATURATION: 95 % | HEART RATE: 87 BPM | DIASTOLIC BLOOD PRESSURE: 70 MMHG | WEIGHT: 254 LBS | BODY MASS INDEX: 37.62 KG/M2 | SYSTOLIC BLOOD PRESSURE: 140 MMHG | HEIGHT: 69 IN

## 2022-05-25 DIAGNOSIS — Z95.810 ICD (IMPLANTABLE CARDIOVERTER-DEFIBRILLATOR) IN PLACE: ICD-10-CM

## 2022-05-25 DIAGNOSIS — I42.8 NON-ISCHEMIC CARDIOMYOPATHY: ICD-10-CM

## 2022-05-25 DIAGNOSIS — I48.0 PAROXYSMAL A-FIB: Primary | ICD-10-CM

## 2022-05-25 PROCEDURE — 93000 ELECTROCARDIOGRAM COMPLETE: CPT | Performed by: PHYSICIAN ASSISTANT

## 2022-05-25 PROCEDURE — 99213 OFFICE O/P EST LOW 20 MIN: CPT | Performed by: PHYSICIAN ASSISTANT

## 2022-05-25 PROCEDURE — 93282 PRGRMG EVAL IMPLANTABLE DFB: CPT | Performed by: PHYSICIAN ASSISTANT

## 2022-05-25 RX ORDER — ALBUTEROL SULFATE 90 UG/1
AEROSOL, METERED RESPIRATORY (INHALATION) AS NEEDED
COMMUNITY
Start: 2022-04-25

## 2022-05-25 RX ORDER — BUSPIRONE HYDROCHLORIDE 10 MG/1
10 TABLET ORAL 2 TIMES DAILY
COMMUNITY
Start: 2022-05-06

## 2022-05-25 NOTE — PROGRESS NOTES
Abdirahman Bro Vinay  1960  [phone]      11/24/2021    [facility] CARDIOLOGY     Dallas Crespo MD  [address] [facility]  [facility] [address]    Chief Complaint   Patient presents with   • Cardiomyopathy       Problem List:     1.  Nonischemic Cardiomyopathy  1. CHF acute EF per [facility] reportedly <30%  2. 12/16 bnp 480  3. 2008 [facility] CBH wnl EF 60  4. 2/17 [facility] nl cors EF 20%  5. 6/27/17 SJM ICD implant  6. ER 2/2019 NSR/ST, PAC  7. Echo 2/2019 LVEF 35-40%  8. Echo 3/4/2020: 35%  9. Echocardiogram 5/20/2022 EF 35%  2. DM -A1c11- 8.3 2018   Onset 2011  3. HTN  4. HL-on statin  1. 2016 total chol 104  5. TAMAR- cpap intolerant  6. Palpitations/PAF:  1. 12/16 holter <1% pvc, <1% pac   2. Noted short episodes on device interrogation   7. Chronically elevated cpk 4-500      Allergies  Allergies   Allergen Reactions   • Other GI Intolerance     All pain medications cause constipation       Current Medications    Current Outpatient Medications:   •  allopurinol (ZYLOPRIM) 100 MG tablet, Take 100 mg by mouth Daily., Disp: , Rfl:   •  aspirin 81 MG chewable tablet, Chew 81 mg Daily., Disp: , Rfl:   •  busPIRone (BUSPAR) 10 MG tablet, Take 10 mg by mouth 2 (Two) Times a Day., Disp: , Rfl:   •  carvedilol (COREG) 12.5 MG tablet, Take 1 tablet by mouth Daily., Disp: 90 tablet, Rfl: 3  •  cetirizine (zyrTEC) 10 MG tablet, Take 10 mg by mouth Daily., Disp: , Rfl:   •  citalopram (CeleXA) 10 MG tablet, Take 10 mg by mouth Daily., Disp: , Rfl:   •  Comfort EZ Pen Needles 32G X 4 MM misc, USE AS DIRECTED EVERY DAY, Disp: 100 each, Rfl: 3  •  Continuous Blood Gluc  (FreeStyle Jenifer 2 [place]) device, 1 each Daily., Disp: 1 each, Rfl: 0  •  Continuous Blood Gluc Sensor (FreeStyle Jenifer 2 Sensor) misc, 1 each Every 14 (Fourteen) Days., Disp: 2 each, Rfl: 5  •  Easy Touch Test test strip, 2 (Two) Times a Day. as directed, Disp: , Rfl:   •  insulin detemir (Levemir FlexTouch) 100 UNIT/ML injection,  Inject 60 Units under the skin into the appropriate area as directed Daily. Adjust to fasting FSBS <120; max daily dose 70u, Disp: 21 mL, Rfl: 3  •  Jardiance 10 MG tablet tablet, TAKE ONE TABLET BY MOUTH EVERY DAY, Disp: 90 tablet, Rfl: 2  •  linagliptin (Tradjenta) 5 MG tablet tablet, Take 1 tablet by mouth Daily., Disp: 90 tablet, Rfl: 3  •  metFORMIN ER (GLUCOPHAGE-XR) 500 MG 24 hr tablet, TAKE TWO TABLETS BY MOUTH TWICE DAILY, Disp: 360 tablet, Rfl: 3  •  montelukast (SINGULAIR) 10 MG tablet, Take 10 mg by mouth Every Night., Disp: , Rfl:   •  pantoprazole (PROTONIX) 40 MG EC tablet, Take 40 mg by mouth Daily., Disp: , Rfl:   •  rosuvastatin (Crestor) 10 MG tablet, Take 1 tablet by mouth Daily., Disp: 90 tablet, Rfl: 3  •  sacubitril-valsartan (Entresto) 24-26 MG tablet, Take 1 tablet by mouth 2 (Two) Times a Day., Disp: 60 tablet, Rfl: 6  •  spironolactone (ALDACTONE) 50 MG tablet, Take 1 tablet by mouth Daily., Disp: 90 tablet, Rfl: 3  •  tamsulosin (FLOMAX) 0.4 MG capsule 24 hr capsule, Take 1 capsule by mouth Every Night., Disp: , Rfl:   •  torsemide (DEMADEX) 5 MG tablet, Take 1 tablet by mouth Daily., Disp: 90 tablet, Rfl: 3  •  traZODone (DESYREL) 50 MG tablet, Take 50 mg by mouth every night at bedtime., Disp: , Rfl:   •  TRUEplus Lancets 30G misc, USE TO CHECK BLOOD GLUCOSE LEVEL TWICE DAILY, Disp: , Rfl:   •  Ventolin  (90 Base) MCG/ACT inhaler, As Needed., Disp: , Rfl:     History of Present Illness     Pt presents for follow up of CHF/HTN. Since the pt has seen us, pt denies any palpitations, CP, LH, and dizziness. Denies any hospitalizations, ER visits, ICD shocks, or TIA/CVA symptoms. Overall feels well. No side effects to medications. BP stable at home.  He states that he exercises on a stationary bike and goes to [employer] on a regular basis.  He is following his diabetes closely try to keep his A1c down.      Vitals:    05/25/22 1022   BP: 140/70   BP Location: Right arm   Patient  "Position: Sitting   Pulse: 87   SpO2: 95%   Weight: 115 kg (254 lb)   Height: 175.3 cm (69\")       PE:  General: NAD  Neck: no JVD, no carotid bruits, no TM  Heart RRR, NL S1, S2, S4 present, no rubs, murmurs  Lungs: CTA, no wheezes, rhonchi, or rales  Abd: soft, non-tender, NL BS  Ext: No musculoskeletal deformities, no edema, cyanosis, or clubbing  Psych: normal mood and affect      ECG 12 Lead    Date/Time: 5/25/2022 1:09 PM  Performed by: Bjorn Gilmore PA  Authorized by: Bjorn Gilmore PA   Rhythm: sinus rhythm  Conduction: conduction normal  QRS axis: normal and left  Other findings: non-specific ST-T wave changes        .    1. Paroxysmal A-fib (HCC)    2. Non-ischemic cardiomyopathy (HCC)    3. ICD (implantable cardioverter-defibrillator) in place        Device interrogation: Saint Norm single-chamber ICD VVI at 40 RV paced less than 1%.  Normal R wave 11.0 mV.  Normal threshold and impedances.  Battery voltage is 5.3 years remaining.  No arrhythmias.    Plan:  1. NICM/CHF: on appropriate meds: no changes for now  - EF 20% in 2017, now LVEF 35% , on GDMT  - ICD function normal  - Class II symptoms, doing well on Entresto with improvement of symptoms, continue Entresto 24/26 mg BID, carvedilol     2. HTN:   -BP well controlled, continue medications    F/up in 12 months       Electronically signed by SHASTA Peters, 05/25/22, 1:11 PM EDT.  "

## 2022-08-31 ENCOUNTER — OFFICE VISIT (OUTPATIENT)
Dept: ENDOCRINOLOGY | Facility: CLINIC | Age: 62
End: 2022-08-31

## 2022-08-31 VITALS
OXYGEN SATURATION: 98 % | DIASTOLIC BLOOD PRESSURE: 82 MMHG | HEART RATE: 86 BPM | WEIGHT: 252 LBS | SYSTOLIC BLOOD PRESSURE: 136 MMHG | BODY MASS INDEX: 37.33 KG/M2 | HEIGHT: 69 IN

## 2022-08-31 DIAGNOSIS — I10 ESSENTIAL HYPERTENSION: ICD-10-CM

## 2022-08-31 DIAGNOSIS — E78.5 HYPERLIPIDEMIA LDL GOAL <100: ICD-10-CM

## 2022-08-31 DIAGNOSIS — E11.65 TYPE 2 DIABETES MELLITUS WITH HYPERGLYCEMIA, WITH LONG-TERM CURRENT USE OF INSULIN: Primary | ICD-10-CM

## 2022-08-31 DIAGNOSIS — Z79.4 TYPE 2 DIABETES MELLITUS WITH HYPERGLYCEMIA, WITH LONG-TERM CURRENT USE OF INSULIN: Primary | ICD-10-CM

## 2022-08-31 DIAGNOSIS — Z79.4 INSULIN LONG-TERM USE: ICD-10-CM

## 2022-08-31 LAB
EXPIRATION DATE: ABNORMAL
EXPIRATION DATE: NORMAL
GLUCOSE BLDC GLUCOMTR-MCNC: 134 MG/DL (ref 70–130)
HBA1C MFR BLD: 6.6 %
Lab: ABNORMAL
Lab: NORMAL

## 2022-08-31 PROCEDURE — 3044F HG A1C LEVEL LT 7.0%: CPT | Performed by: INTERNAL MEDICINE

## 2022-08-31 PROCEDURE — 83036 HEMOGLOBIN GLYCOSYLATED A1C: CPT | Performed by: INTERNAL MEDICINE

## 2022-08-31 PROCEDURE — 82947 ASSAY GLUCOSE BLOOD QUANT: CPT | Performed by: INTERNAL MEDICINE

## 2022-08-31 PROCEDURE — 99214 OFFICE O/P EST MOD 30 MIN: CPT | Performed by: INTERNAL MEDICINE

## 2022-08-31 NOTE — ASSESSMENT & PLAN NOTE
Diabetes is unchanged.  A1c okay at 6.6%.  Continue current treatment regimen.  Diabetes will be reassessed in 3 months.

## 2022-08-31 NOTE — PROGRESS NOTES
"     Office Note      Date: 2022  Patient Name: Abdirahman Mclean  MRN: [id number]  : 1960    Chief Complaint   Patient presents with   • Diabetes       History of Present Illness:   Abdirahman Mclean is a 62 y.o. male who presents for Diabetes type 2. Diagnosed in: . Treated in past with oral agents. Current treatments: tradjenta, metformin, jardiance and basal insulin. Number of insulin shots per day: 1. Checks blood sugar 2 times a day. Has low blood sugar: no. Aspirin use: Yes. Statin use: Yes. ACE-I/ARB use: Yes. Changes in health since last visit: none. Last eye exam: 10/2021.    Insurance wouldn't cover FreeStyle Jenifer CGM.    Subjective      Diabetic Complications:  Eyes: No  Kidneys: No  Feet: No  Heart: Yes - cardiomyopathy    Diet and Exercise:  Meals per day: 2  Minutes of exercise per week: 70 mins.    Review of Systems:   Review of Systems   Constitutional: Negative.    Cardiovascular: Negative.    Gastrointestinal: Negative.    Endocrine: Negative.        The following portions of the patient's history were reviewed and updated as appropriate: allergies, current medications, past family history, past medical history, past social history, past surgical history and problem list.    Objective       Visit Vitals  /82   Pulse 86   Ht 175.3 cm (69\")   Wt 114 kg (252 lb)   SpO2 98%   BMI 37.21 kg/m²       Physical Exam:  Physical Exam  Constitutional:       Appearance: Normal appearance.   Neurological:      Mental Status: He is alert.         Labs:    HbA1c  Lab Results   Component Value Date    HGBA1C 6.6 2022       CMP  Lab Results   Component Value Date    GLUCOSE 124 (H) 2022    BUN 10 2022    CREATININE 0.83 2022    EGFRIFNONA 89 12/15/2020    BCR 12.0 2022    K 4.2 2022    CO2 24.0 2022    CALCIUM 8.9 2022    AST 23 2022    ALT 32 2022        Lipid Panel  Lab Results   Component Value Date    HDL 33 (L) 2022    LDL " 45 04/05/2022    TRIG 323 (H) 04/05/2022        TSH  Lab Results   Component Value Date    TSH 2.260 04/05/2022        Hemoglobin A1C  Lab Results   Component Value Date    HGBA1C 6.6 08/31/2022        Microalbumin/Creatinine  Lab Results   Component Value Date    MALBCRERATIO  04/05/2022      Comment:      Unable to calculate    MICROALBUR <1.2 04/05/2022           Assessment / Plan      Assessment & Plan:  Diagnoses and all orders for this visit:    1. Type 2 diabetes mellitus with hyperglycemia, with long-term current use of insulin (HCC) (Primary)  Assessment & Plan:  Diabetes is unchanged.  A1c okay at 6.6%.  Continue current treatment regimen.  Diabetes will be reassessed in 3 months.    Orders:  -     POC Glucose, Blood  -     POC Glycosylated Hemoglobin (Hb A1C)    2. Essential hypertension  Assessment & Plan:  Hypertension is unchanged.  Continue current treatment regimen.  Blood pressure will be reassessed at the next regular appointment.      3. Hyperlipidemia LDL goal <100  Assessment & Plan:  Lipids okay last visit.  Continue statin.      4. Insulin long-term use (HCC)      Return in about 3 months (around 11/30/2022) for Recheck with A1c.    Hugo Andrade MD   08/31/2022

## 2022-09-15 RX ORDER — EMPAGLIFLOZIN 10 MG/1
TABLET, FILM COATED ORAL
Qty: 90 TABLET | Refills: 1 | Status: SHIPPED | OUTPATIENT
Start: 2022-09-15 | End: 2023-01-12 | Stop reason: SDUPTHER

## 2022-10-04 RX ORDER — LINAGLIPTIN 5 MG/1
TABLET, FILM COATED ORAL
Qty: 90 TABLET | Refills: 3 | Status: SHIPPED | OUTPATIENT
Start: 2022-10-04 | End: 2023-01-12 | Stop reason: SDUPTHER

## 2022-10-23 PROCEDURE — 93296 REM INTERROG EVL PM/IDS: CPT | Performed by: INTERNAL MEDICINE

## 2022-10-23 PROCEDURE — 93295 DEV INTERROG REMOTE 1/2/MLT: CPT | Performed by: INTERNAL MEDICINE

## 2022-10-31 RX ORDER — INSULIN GLARGINE 300 U/ML
70 INJECTION, SOLUTION SUBCUTANEOUS DAILY
Qty: 9 ML | Refills: 2 | Status: SHIPPED | OUTPATIENT
Start: 2022-10-31 | End: 2023-01-12 | Stop reason: SDUPTHER

## 2022-10-31 NOTE — TELEPHONE ENCOUNTER
Patient called to request Rx for insulin glargine kwikpen. Patient is using 70 units once daily. Patient has enough on hand to last one or two more days.

## 2022-11-01 ENCOUNTER — TELEPHONE (OUTPATIENT)
Dept: ENDOCRINOLOGY | Facility: CLINIC | Age: 62
End: 2022-11-01

## 2022-11-01 NOTE — TELEPHONE ENCOUNTER
Spoke with pharmacy.  They cannot break boxes of Toujeo.  They are going to dispense 6mL for 25 day supply

## 2022-11-09 RX ORDER — PEN NEEDLE, DIABETIC 32GX 5/32"
NEEDLE, DISPOSABLE MISCELLANEOUS
Qty: 100 EACH | Refills: 3 | Status: SHIPPED | OUTPATIENT
Start: 2022-11-09 | End: 2023-01-12 | Stop reason: SDUPTHER

## 2022-12-07 ENCOUNTER — OFFICE VISIT (OUTPATIENT)
Dept: CARDIOLOGY | Facility: CLINIC | Age: 62
End: 2022-12-07

## 2022-12-07 VITALS
WEIGHT: 255.2 LBS | SYSTOLIC BLOOD PRESSURE: 118 MMHG | DIASTOLIC BLOOD PRESSURE: 80 MMHG | OXYGEN SATURATION: 96 % | BODY MASS INDEX: 37.8 KG/M2 | HEIGHT: 69 IN | HEART RATE: 70 BPM

## 2022-12-07 DIAGNOSIS — R40.0 HAS DAYTIME DROWSINESS: ICD-10-CM

## 2022-12-07 DIAGNOSIS — I10 ESSENTIAL HYPERTENSION: ICD-10-CM

## 2022-12-07 DIAGNOSIS — I42.8 NON-ISCHEMIC CARDIOMYOPATHY: Primary | ICD-10-CM

## 2022-12-07 DIAGNOSIS — I50.22 CHRONIC SYSTOLIC CONGESTIVE HEART FAILURE, NYHA CLASS 3: ICD-10-CM

## 2022-12-07 DIAGNOSIS — I48.0 PAROXYSMAL A-FIB: ICD-10-CM

## 2022-12-07 PROCEDURE — 93282 PRGRMG EVAL IMPLANTABLE DFB: CPT | Performed by: PHYSICIAN ASSISTANT

## 2022-12-07 PROCEDURE — 99213 OFFICE O/P EST LOW 20 MIN: CPT | Performed by: PHYSICIAN ASSISTANT

## 2022-12-07 NOTE — PROGRESS NOTES
Abdirahman Bro Vinay  1960  [phone]      12/07/2022    [facility] CARDIOLOGY MAIN CAMPUS     Dallas Crespo MD  [address] [facility]  [place] [id number]    Chief Complaint   Patient presents with   • Non-ischemic cardiomyopathy (HCC)       Problem List:     1.  Nonischemic Cardiomyopathy  1. CHF acute EF per [facility] reportedly <30%  2. 12/16 bnp 480  3. 2008 [facility] wnl EF 60  4. 2/17 [facility] nl cors EF 20%  5. 6/27/17 SJM ICD implant  6. ER 2/2019 NSR/ST, PAC  7. Echo 2/2019 LVEF 35-40%  8. Echo 3/4/2020: 35%  9. Echocardiogram 3/20/2022 EF 35%  2. DM -A1c11- 8.3 2018              Onset 2011  3. HTN  4. HL-on statin  1. 2016 total chol 104  5. TAMAR- cpap intolerant  6. Palpitations/PAF:  1. 12/16 holter <1% pvc, <1% pac   2. Noted short episodes on device interrogation   7. Chronically elevated cpk 4-500  Allergies  Allergies   Allergen Reactions   • Other GI Intolerance     All pain medications cause constipation       Current Medications    Current Outpatient Medications:   •  allopurinol (ZYLOPRIM) 100 MG tablet, Take 100 mg by mouth Daily., Disp: , Rfl:   •  aspirin 81 MG chewable tablet, Chew 81 mg Daily., Disp: , Rfl:   •  BD Pen Needle Cristy U/F 32G X 4 MM misc, USE AS DIRECTED EVERY DAY, Disp: 100 each, Rfl: 3  •  busPIRone (BUSPAR) 10 MG tablet, Take 10 mg by mouth 2 (Two) Times a Day., Disp: , Rfl:   •  carvedilol (COREG) 12.5 MG tablet, Take 1 tablet by mouth Daily., Disp: 90 tablet, Rfl: 3  •  cetirizine (zyrTEC) 10 MG tablet, Take 10 mg by mouth Daily., Disp: , Rfl:   •  citalopram (CeleXA) 10 MG tablet, Take 10 mg by mouth Daily., Disp: , Rfl:   •  Continuous Blood Gluc  (FreeStyle Jenifer 2 [place]) device, 1 each Daily., Disp: 1 each, Rfl: 0  •  Continuous Blood Gluc Sensor (FreeStyle Jenifer 2 Sensor) misc, 1 each Every 14 (Fourteen) Days., Disp: 2 each, Rfl: 5  •  Easy Touch Test test strip, 2 (Two) Times a Day. as directed, Disp: , Rfl:   •  Insulin Glargine, 2 Unit  Dial, (Toujeo Max SoloStar) 300 UNIT/ML solution pen-injector injection, Inject 70 Units under the skin into the appropriate area as directed Daily., Disp: 9 mL, Rfl: 2  •  Jardiance 10 MG tablet tablet, TAKE ONE TABLET BY MOUTH EVERY DAY, Disp: 90 tablet, Rfl: 1  •  metFORMIN ER (GLUCOPHAGE-XR) 500 MG 24 hr tablet, TAKE TWO TABLETS BY MOUTH TWICE DAILY, Disp: 360 tablet, Rfl: 3  •  montelukast (SINGULAIR) 10 MG tablet, Take 10 mg by mouth Every Night., Disp: , Rfl:   •  pantoprazole (PROTONIX) 40 MG EC tablet, Take 40 mg by mouth Daily., Disp: , Rfl:   •  rosuvastatin (Crestor) 10 MG tablet, Take 1 tablet by mouth Daily., Disp: 90 tablet, Rfl: 3  •  sacubitril-valsartan (Entresto) 24-26 MG tablet, Take 1 tablet by mouth 2 (Two) Times a Day., Disp: 60 tablet, Rfl: 6  •  spironolactone (ALDACTONE) 50 MG tablet, Take 1 tablet by mouth Daily., Disp: 90 tablet, Rfl: 3  •  tamsulosin (FLOMAX) 0.4 MG capsule 24 hr capsule, Take 1 capsule by mouth Every Night., Disp: , Rfl:   •  torsemide (DEMADEX) 5 MG tablet, Take 1 tablet by mouth Daily., Disp: 90 tablet, Rfl: 3  •  Tradjenta 5 MG tablet tablet, TAKE ONE TABLET BY MOUTH EVERY DAY, Disp: 90 tablet, Rfl: 3  •  traZODone (DESYREL) 50 MG tablet, Take 50 mg by mouth every night at bedtime., Disp: , Rfl:   •  TRUEplus Lancets 30G misc, USE TO CHECK BLOOD GLUCOSE LEVEL TWICE DAILY, Disp: , Rfl:   •  Ventolin  (90 Base) MCG/ACT inhaler, As Needed., Disp: , Rfl:     History of Present Illness:     Pt presents for follow up of CHF/HTN/PAF. Since the pt has seen us, pt denies any palpitations, SOB, CP, LH, and dizziness, presyncope, syncope. Denies any hospitalizations, ER visits, ICD shocks, or TIA/CVA symptoms. Overall feels well. No side effects to medications. He is sleepy in the evening when sitting down. He has sleep apnea but could not tolerate CPAP in the past. He is supposed to wear O2 at night and states he does most nights.     ROS:  General:  Denies fatigue,  "weight gain or loss  Cardiovascular:  Denies CP, PND, syncope, near syncope, edema or palpitations.  Pulmonary:  Denies MIRAMONTES, cough, or wheezing    Vitals:    12/07/22 1056   BP: 118/80   BP Location: Left arm   Patient Position: Sitting   Pulse: 70   SpO2: 96%   Weight: 116 kg (255 lb 3.2 oz)   Height: 175.3 cm (69\")       PE:  General: NAD  Neck: no JVD, no carotid bruits, no TM  Heart RRR, NL S1, S2, S4 present, no rubs, murmurs  Lungs: CTA, no wheezes, rhonchi, or rales  Abd: soft, non-tender, NL BS  Ext: No musculoskeletal deformities, no edema, cyanosis, or clubbing  Psych: normal mood and affect    Diagnostic Data:  Procedures.    Device interrogation: [employer] single-chamber ICD VVI at 40 RV paced less than 1%.  Normal R wave 11.0 mV.  Normal threshold and impedances.  Battery voltage is 5 years remaining.  No arrhythmias other than 1 episode of SVT (8 seconds) .     No diagnosis found.      Plan:    1. NICM/CHF: on appropriate meds: no changes for now  - EF 20% in 2017, now LVEF 35% , on GDMT  - ICD function normal  - Class II symptoms, doing well on Entresto 24/26 mg BID, carvedilol 12.5 mg BID, Aldactone 50 mg daily, Jardiance 10 mg daily.      2. PAF  - no recurrences. One 8 second episode of SVT on device interrogation.     3. HTN:   -BP well controlled, continue medications    4. Daytime Drowsiness:  - refer back to sleep medicine.     F/up in 9 months       Electronically signed by SHASTA Wolf, 12/07/22, 11:16 AM EST.    "

## 2022-12-08 ENCOUNTER — OFFICE VISIT (OUTPATIENT)
Dept: CARDIOLOGY | Facility: CLINIC | Age: 62
End: 2022-12-08

## 2022-12-08 VITALS
BODY MASS INDEX: 37.77 KG/M2 | SYSTOLIC BLOOD PRESSURE: 120 MMHG | OXYGEN SATURATION: 95 % | HEART RATE: 81 BPM | HEIGHT: 69 IN | DIASTOLIC BLOOD PRESSURE: 78 MMHG | WEIGHT: 255 LBS

## 2022-12-08 DIAGNOSIS — I50.22 CHRONIC SYSTOLIC CONGESTIVE HEART FAILURE: Primary | ICD-10-CM

## 2022-12-08 DIAGNOSIS — E78.2 MIXED HYPERLIPIDEMIA: ICD-10-CM

## 2022-12-08 DIAGNOSIS — E11.65 TYPE 2 DIABETES MELLITUS WITH HYPERGLYCEMIA, WITHOUT LONG-TERM CURRENT USE OF INSULIN: ICD-10-CM

## 2022-12-08 DIAGNOSIS — I10 PRIMARY HYPERTENSION: ICD-10-CM

## 2022-12-08 PROCEDURE — 99214 OFFICE O/P EST MOD 30 MIN: CPT | Performed by: INTERNAL MEDICINE

## 2022-12-08 RX ORDER — ROSUVASTATIN CALCIUM 10 MG/1
10 TABLET, COATED ORAL DAILY
Qty: 90 TABLET | Refills: 3 | Status: SHIPPED | OUTPATIENT
Start: 2022-12-08 | End: 2022-12-27 | Stop reason: SDUPTHER

## 2022-12-08 RX ORDER — SPIRONOLACTONE 50 MG/1
50 TABLET, FILM COATED ORAL DAILY
Qty: 90 TABLET | Refills: 3 | Status: SHIPPED | OUTPATIENT
Start: 2022-12-08

## 2022-12-08 RX ORDER — SACUBITRIL AND VALSARTAN 24; 26 MG/1; MG/1
1 TABLET, FILM COATED ORAL 2 TIMES DAILY
Qty: 60 TABLET | Refills: 6 | Status: SHIPPED | OUTPATIENT
Start: 2022-12-08

## 2022-12-08 RX ORDER — TORSEMIDE 5 MG/1
5 TABLET ORAL DAILY
Qty: 90 TABLET | Refills: 3 | Status: SHIPPED | OUTPATIENT
Start: 2022-12-08

## 2022-12-08 RX ORDER — CARVEDILOL 12.5 MG/1
12.5 TABLET ORAL DAILY
Qty: 90 TABLET | Refills: 3 | Status: SHIPPED | OUTPATIENT
Start: 2022-12-08 | End: 2023-02-22 | Stop reason: SDUPTHER

## 2022-12-08 NOTE — PROGRESS NOTES
[facility] Cardiology  Office Progress Note  Abdirahman Mclean  1960  [address] [place] [address]       Visit Date: 12/08/22    PCP: Dallas Crespo MD  [address] [place] [address] [facility] [address] [address]    IDENTIFICATION: A 62 y.o. 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    PROBLEM LIST:   1.  Nonischemic Cardiomyopathy  1. 2008 [facility] wnl EF 60  2. 2/17 [facility] nl cors EF 20%  3. 6/27/17 SJM ICD implant  4. ER 2/2019 NSR/ST, PAC  5. Echo 2/2019 LVEF 35-40%  6. Echo 3/4/2020: 35%  2. DM -A1c11- 8.3 2018              Onset 2011   1.  7/21 A1c 6.5   2.  11/21 A1c 6.7  3. HTN  4. HL-on statin  1. 12/20 117/233/34/46  5. TAMAR- cpap intolerant  6. Palpitations/PAF:  1. 12/16 holter <1% pvc, <1% pac   2. Noted short episodes on device interrogation   7. Chronically elevated cpk 4-500        CC:   Chief Complaint   Patient presents with   • Acute on chronic combined systolic and diastolic CHF (conge       Allergies  Allergies   Allergen Reactions   • Other GI Intolerance     All pain medications cause constipation       Current Medications    Current Outpatient Medications:   •  allopurinol (ZYLOPRIM) 100 MG tablet, Take 100 mg by mouth Daily., Disp: , Rfl:   •  aspirin 81 MG chewable tablet, Chew 81 mg Daily., Disp: , Rfl:   •  BD Pen Needle Cristy U/F 32G X 4 MM misc, USE AS DIRECTED EVERY DAY, Disp: 100 each, Rfl: 3  •  busPIRone (BUSPAR) 10 MG tablet, Take 10 mg by mouth 2 (Two) Times a Day., Disp: , Rfl:   •  carvedilol (COREG) 12.5 MG tablet, Take 1 tablet by mouth Daily., Disp: 90 tablet, Rfl: 3  •  cetirizine (zyrTEC) 10 MG tablet, Take 10 mg by mouth Daily., Disp: , Rfl:   •  citalopram (CeleXA) 10 MG tablet, Take 10 mg by mouth Daily., Disp: , Rfl:   •  Continuous Blood Gluc  (FreeStyle Jenifer 2 Medina) device, 1 each Daily., Disp: 1 each, Rfl: 0  •  Continuous Blood Gluc Sensor (FreeStyle Jenifer 2 Sensor) misc, 1 each Every 14 (Fourteen) Days.,  "Disp: 2 each, Rfl: 5  •  Easy Touch Test test strip, 2 (Two) Times a Day. as directed, Disp: , Rfl:   •  Insulin Glargine, 2 Unit Dial, (Toujeo Max SoloStar) 300 UNIT/ML solution pen-injector injection, Inject 70 Units under the skin into the appropriate area as directed Daily., Disp: 9 mL, Rfl: 2  •  Jardiance 10 MG tablet tablet, TAKE ONE TABLET BY MOUTH EVERY DAY, Disp: 90 tablet, Rfl: 1  •  metFORMIN ER (GLUCOPHAGE-XR) 500 MG 24 hr tablet, TAKE TWO TABLETS BY MOUTH TWICE DAILY, Disp: 360 tablet, Rfl: 3  •  montelukast (SINGULAIR) 10 MG tablet, Take 10 mg by mouth Every Night., Disp: , Rfl:   •  pantoprazole (PROTONIX) 40 MG EC tablet, Take 40 mg by mouth Daily., Disp: , Rfl:   •  rosuvastatin (Crestor) 10 MG tablet, Take 1 tablet by mouth Daily., Disp: 90 tablet, Rfl: 3  •  sacubitril-valsartan (Entresto) 24-26 MG tablet, Take 1 tablet by mouth 2 (Two) Times a Day., Disp: 60 tablet, Rfl: 6  •  spironolactone (ALDACTONE) 50 MG tablet, Take 1 tablet by mouth Daily., Disp: 90 tablet, Rfl: 3  •  tamsulosin (FLOMAX) 0.4 MG capsule 24 hr capsule, Take 1 capsule by mouth Every Night., Disp: , Rfl:   •  torsemide (DEMADEX) 5 MG tablet, Take 1 tablet by mouth Daily., Disp: 90 tablet, Rfl: 3  •  Tradjenta 5 MG tablet tablet, TAKE ONE TABLET BY MOUTH EVERY DAY, Disp: 90 tablet, Rfl: 3  •  traZODone (DESYREL) 50 MG tablet, Take 50 mg by mouth every night at bedtime., Disp: , Rfl:   •  TRUEplus Lancets 30G misc, USE TO CHECK BLOOD GLUCOSE LEVEL TWICE DAILY, Disp: , Rfl:   •  Ventolin  (90 Base) MCG/ACT inhaler, As Needed., Disp: , Rfl:       History of Present Illness   Abdirahman Mclean is a 62 y.o. year old male here for follow up.    No symptoms of new.  He is largely sedentary.  He had EP appointment yesterday      OBJECTIVE:  Vitals:    12/08/22 1502   BP: 120/78   BP Location: Right arm   Patient Position: Sitting   Pulse: 81   SpO2: 95%   Weight: 116 kg (255 lb)   Height: 175.3 cm (69\")     Body mass index is " 37.66 kg/m².    Constitutional:       Appearance: Healthy appearance. Not in distress.   Neck:      Vascular: No JVR. JVD normal.   Pulmonary:      Effort: Pulmonary effort is normal.      Breath sounds: Normal breath sounds. No wheezing. No rhonchi. No rales.   Chest:      Chest wall: Not tender to palpatation.   Cardiovascular:      PMI at left midclavicular line. ICD CDI Normal rate. Regular rhythm. Normal S1. Normal S2.      Murmurs: There is no murmur.      No gallop. No click. No rub.   Pulses:     Intact distal pulses.   Edema:     Peripheral edema absent.   Abdominal:      General: Bowel sounds are normal.      Palpations: Abdomen is soft.      Tenderness: There is no abdominal tenderness.      Comments: obese   Musculoskeletal: Normal range of motion.         General: No tenderness. Skin:     General: Skin is warm and dry.   Neurological:      General: No focal deficit present.      Mental Status: Alert and oriented to person, place and time.         Diagnostic Data:  Procedures      ASSESSMENT:   Diagnosis Plan   1. Chronic systolic congestive heart failure (HCC)        2. Primary hypertension        3. Mixed hyperlipidemia        4. Type 2 diabetes mellitus with hyperglycemia, without long-term current use of insulin (HCC)            PLAN:  CHF nonischemic on guideline directed medication.        Hypertension controlled tamsulosin Entresto carvedilol    Mixed dyslipidemia controlled on statin therapy    Diabetes with fasting blood sugars less than 130 he states at home.  Reiterated need to be compliant with lower carbohydrate intake           Danny Licona MD, [facility]

## 2022-12-27 RX ORDER — ROSUVASTATIN CALCIUM 10 MG/1
10 TABLET, COATED ORAL DAILY
Qty: 90 TABLET | Refills: 3 | Status: SHIPPED | OUTPATIENT
Start: 2022-12-27

## 2022-12-27 NOTE — TELEPHONE ENCOUNTER
Lab Results   Component Value Date    GLUCOSE 124 (H) 04/05/2022    BUN 10 04/05/2022    CREATININE 0.83 04/05/2022    EGFRIFNONA 89 12/15/2020    BCR 12.0 04/05/2022    K 4.2 04/05/2022    CO2 24.0 04/05/2022    CALCIUM 8.9 04/05/2022    ALBUMIN 4.70 04/05/2022    AST 23 04/05/2022    ALT 32 04/05/2022     Lab Results   Component Value Date    CHOL 127 04/05/2022    TRIG 323 (H) 04/05/2022    HDL 33 (L) 04/05/2022    LDL 45 04/05/2022

## 2023-01-12 ENCOUNTER — OFFICE VISIT (OUTPATIENT)
Dept: ENDOCRINOLOGY | Facility: CLINIC | Age: 63
End: 2023-01-12
Payer: MEDICARE

## 2023-01-12 VITALS
HEART RATE: 75 BPM | OXYGEN SATURATION: 96 % | BODY MASS INDEX: 37.18 KG/M2 | SYSTOLIC BLOOD PRESSURE: 112 MMHG | WEIGHT: 251 LBS | DIASTOLIC BLOOD PRESSURE: 78 MMHG | HEIGHT: 69 IN

## 2023-01-12 DIAGNOSIS — E11.65 TYPE 2 DIABETES MELLITUS WITH HYPERGLYCEMIA, WITH LONG-TERM CURRENT USE OF INSULIN: Primary | ICD-10-CM

## 2023-01-12 DIAGNOSIS — I10 ESSENTIAL HYPERTENSION: ICD-10-CM

## 2023-01-12 DIAGNOSIS — Z79.4 TYPE 2 DIABETES MELLITUS WITH HYPERGLYCEMIA, WITH LONG-TERM CURRENT USE OF INSULIN: Primary | ICD-10-CM

## 2023-01-12 DIAGNOSIS — I42.8 NON-ISCHEMIC CARDIOMYOPATHY: ICD-10-CM

## 2023-01-12 DIAGNOSIS — E78.5 HYPERLIPIDEMIA LDL GOAL <100: ICD-10-CM

## 2023-01-12 DIAGNOSIS — Z79.4 INSULIN LONG-TERM USE: ICD-10-CM

## 2023-01-12 LAB
EXPIRATION DATE: NORMAL
EXPIRATION DATE: NORMAL
GLUCOSE BLDC GLUCOMTR-MCNC: 116 MG/DL (ref 70–130)
HBA1C MFR BLD: 6.1 %
Lab: NORMAL
Lab: NORMAL

## 2023-01-12 PROCEDURE — 99214 OFFICE O/P EST MOD 30 MIN: CPT | Performed by: INTERNAL MEDICINE

## 2023-01-12 PROCEDURE — 82947 ASSAY GLUCOSE BLOOD QUANT: CPT | Performed by: INTERNAL MEDICINE

## 2023-01-12 PROCEDURE — 83036 HEMOGLOBIN GLYCOSYLATED A1C: CPT | Performed by: INTERNAL MEDICINE

## 2023-01-12 PROCEDURE — 3044F HG A1C LEVEL LT 7.0%: CPT | Performed by: INTERNAL MEDICINE

## 2023-01-12 RX ORDER — ALPRAZOLAM 1 MG/1
1 TABLET ORAL DAILY
Qty: 100 EACH | Refills: 3 | Status: SHIPPED | OUTPATIENT
Start: 2023-01-12

## 2023-01-12 RX ORDER — INSULIN GLARGINE 300 U/ML
70 INJECTION, SOLUTION SUBCUTANEOUS DAILY
Qty: 9 ML | Refills: 2 | Status: SHIPPED | OUTPATIENT
Start: 2023-01-12

## 2023-01-12 RX ORDER — PEN NEEDLE, DIABETIC 32GX 5/32"
1 NEEDLE, DISPOSABLE MISCELLANEOUS DAILY
Qty: 100 EACH | Refills: 3 | Status: SHIPPED | OUTPATIENT
Start: 2023-01-12

## 2023-01-12 RX ORDER — METFORMIN HYDROCHLORIDE 500 MG/1
1000 TABLET, EXTENDED RELEASE ORAL 2 TIMES DAILY
Qty: 360 TABLET | Refills: 3 | Status: SHIPPED | OUTPATIENT
Start: 2023-01-12

## 2023-01-12 NOTE — PROGRESS NOTES
"     Office Note      Date: 2023  Patient Name: Abdirahman Mclean  MRN: [id number]  : 1960    Chief Complaint   Patient presents with   • Diabetes       History of Present Illness:   Abdirahman Mclean is a 62 y.o. male who presents for Diabetes type 2. Diagnosed in: . Treated in past with oral agents. Current treatments: tradjenta, metformin, jardiance and basal insulin. Number of insulin shots per day: 1. Checks blood sugar  time a day. Has low blood sugar: no. Aspirin use: Yes. Statin use: Yes. ACE-I/ARB use: Yes. Changes in health since last visit: none. Last eye exam: 10/2021.    Subjective      Diabetic Complications:  Eyes: No  Kidneys: No  Feet: No  Heart: Yes - cardiomyopathy    Diet and Exercise:  Meals per day: 2  Minutes of exercise per week: 70 mins.    Review of Systems:   Review of Systems   Constitutional: Negative.    Cardiovascular: Negative.    Gastrointestinal: Negative.    Endocrine: Negative.        The following portions of the patient's history were reviewed and updated as appropriate: allergies, current medications, past family history, past medical history, past social history, past surgical history and problem list.    Objective       Visit Vitals  /78   Pulse 75   Ht 175.3 cm (69\")   Wt 114 kg (251 lb)   SpO2 96%   BMI 37.07 kg/m²       Physical Exam:  Physical Exam  Constitutional:       Appearance: Normal appearance.   Neurological:      Mental Status: He is alert.         Labs:    HbA1c  Lab Results   Component Value Date    HGBA1C 6.1 2023       CMP  Lab Results   Component Value Date    GLUCOSE 124 (H) 2022    BUN 10 2022    CREATININE 0.83 2022    EGFRIFNONA 89 12/15/2020    BCR 12.0 2022    K 4.2 2022    CO2 24.0 2022    CALCIUM 8.9 2022    AST 23 2022    ALT 32 2022        Lipid Panel  Lab Results   Component Value Date    HDL 33 (L) 2022    LDL 45 2022    TRIG 323 (H) 2022    "     TSH  Lab Results   Component Value Date    TSH 2.260 04/05/2022        Hemoglobin A1C  Lab Results   Component Value Date    HGBA1C 6.1 01/12/2023        Microalbumin/Creatinine  Lab Results   Component Value Date    MALBCRERATIO  04/05/2022      Comment:      Unable to calculate    MICROALBUR <1.2 04/05/2022           Assessment / Plan      Assessment & Plan:  Diagnoses and all orders for this visit:    1. Type 2 diabetes mellitus with hyperglycemia, with long-term current use of insulin (HCC) (Primary)  Assessment & Plan:  Diabetes is improving with treatment.   Continue current treatment regimen.  Diabetes will be reassessed in 3 months.    Orders:  -     POC Glucose, Blood  -     POC Glycosylated Hemoglobin (Hb A1C)    2. Essential hypertension  Assessment & Plan:  Hypertension is unchanged.  Continue current treatment regimen.  Blood pressure will be reassessed at the next regular appointment.      3. Hyperlipidemia LDL goal <100  Assessment & Plan:  Continue statin.  Plan to check lipids next visit.      4. Insulin long-term use (HCC)    5. Non-ischemic cardiomyopathy (HCC)  Assessment & Plan:  On SGLT-2 inhibitor and entresto.      Other orders  -     Easy Touch Test test strip; 1 each by Other route Daily. ICD-10 E11.65  Dispense: 100 each; Refill: 3  -     Insulin Glargine, 2 Unit Dial, (Toujeo Max SoloStar) 300 UNIT/ML solution pen-injector injection; Inject 70 Units under the skin into the appropriate area as directed Daily.  Dispense: 9 mL; Refill: 2  -     empagliflozin (Jardiance) 10 MG tablet tablet; Take 1 tablet by mouth Daily.  Dispense: 90 tablet; Refill: 3  -     metFORMIN ER (GLUCOPHAGE-XR) 500 MG 24 hr tablet; Take 2 tablets by mouth 2 (Two) Times a Day.  Dispense: 360 tablet; Refill: 3  -     linagliptin (Tradjenta) 5 MG tablet tablet; Take 1 tablet by mouth Daily.  Dispense: 90 tablet; Refill: 3  -     Insulin Pen Needle (BD Pen Needle Cristy U/F) 32G X 4 MM misc; 1 each Daily.  Dispense:  100 each; Refill: 3    Current Outpatient Medications   Medication Instructions   • allopurinol (ZYLOPRIM) 100 mg, Oral, Daily   • aspirin 81 mg, Oral, Daily   • busPIRone (BUSPAR) 10 mg, Oral, 2 Times Daily   • carvedilol (COREG) 12.5 mg, Oral, Daily   • cetirizine (ZYRTEC) 10 mg, Oral, Daily   • citalopram (CELEXA) 10 mg, Oral, Daily   • Easy Touch Test test strip 1 each, Other, Daily, ICD-10 E11.65   • empagliflozin (JARDIANCE) 10 mg, Oral, Daily   • Insulin Pen Needle (BD Pen Needle Cristy U/F) 32G X 4 MM misc 1 each, Does not apply, Daily   • linagliptin (TRADJENTA) 5 mg, Oral, Daily   • metFORMIN ER (GLUCOPHAGE-XR) 1,000 mg, Oral, 2 Times Daily   • montelukast (SINGULAIR) 10 mg, Oral, Nightly   • pantoprazole (PROTONIX) 40 mg, Oral, Daily   • rosuvastatin (CRESTOR) 10 mg, Oral, Daily   • sacubitril-valsartan (Entresto) 24-26 MG tablet 1 tablet, Oral, 2 Times Daily   • spironolactone (ALDACTONE) 50 mg, Oral, Daily   • tamsulosin (FLOMAX) 0.4 MG capsule 24 hr capsule 1 capsule, Oral, Nightly   • torsemide (DEMADEX) 5 mg, Oral, Daily   • Toujeo Max SoloStar 70 Units, Subcutaneous, Daily   • traZODone (DESYREL) 50 mg, Oral, Every Night at Bedtime   • TRUEplus Lancets 30G misc USE TO CHECK BLOOD GLUCOSE LEVEL TWICE DAILY   • Ventolin  (90 Base) MCG/ACT inhaler As Needed      Return in about 3 months (around 4/12/2023) for Recheck with A1c, CMP, lipid, TSH, microalbumin, foot exam.    Hugo Andrade MD   01/12/2023

## 2023-01-22 PROCEDURE — 93295 DEV INTERROG REMOTE 1/2/MLT: CPT | Performed by: INTERNAL MEDICINE

## 2023-01-22 PROCEDURE — 93296 REM INTERROG EVL PM/IDS: CPT | Performed by: INTERNAL MEDICINE

## 2023-02-22 RX ORDER — CARVEDILOL 12.5 MG/1
12.5 TABLET ORAL DAILY
Qty: 90 TABLET | Refills: 3 | Status: SHIPPED | OUTPATIENT
Start: 2023-02-22

## 2023-04-21 ENCOUNTER — HOSPITAL ENCOUNTER (OUTPATIENT)
Age: 63
End: 2023-04-21
Payer: MEDICARE

## 2023-04-21 DIAGNOSIS — M79.604: Primary | ICD-10-CM

## 2023-04-21 PROCEDURE — 93971 EXTREMITY STUDY: CPT

## 2023-04-23 PROCEDURE — 93295 DEV INTERROG REMOTE 1/2/MLT: CPT | Performed by: INTERNAL MEDICINE

## 2023-04-23 PROCEDURE — 93296 REM INTERROG EVL PM/IDS: CPT | Performed by: INTERNAL MEDICINE

## 2023-04-28 ENCOUNTER — HOSPITAL ENCOUNTER (OUTPATIENT)
Age: 63
End: 2023-04-28
Payer: MEDICARE

## 2023-04-28 DIAGNOSIS — M25.561: Primary | ICD-10-CM

## 2023-04-28 PROCEDURE — 73562 X-RAY EXAM OF KNEE 3: CPT

## 2023-05-22 ENCOUNTER — OFFICE VISIT (OUTPATIENT)
Dept: ENDOCRINOLOGY | Facility: CLINIC | Age: 63
End: 2023-05-22
Payer: MEDICARE

## 2023-05-22 VITALS
DIASTOLIC BLOOD PRESSURE: 88 MMHG | WEIGHT: 246 LBS | HEIGHT: 69 IN | OXYGEN SATURATION: 95 % | BODY MASS INDEX: 36.43 KG/M2 | SYSTOLIC BLOOD PRESSURE: 134 MMHG | HEART RATE: 65 BPM

## 2023-05-22 DIAGNOSIS — E11.65 TYPE 2 DIABETES MELLITUS WITH HYPERGLYCEMIA, WITH LONG-TERM CURRENT USE OF INSULIN: Primary | ICD-10-CM

## 2023-05-22 DIAGNOSIS — I42.8 NON-ISCHEMIC CARDIOMYOPATHY: ICD-10-CM

## 2023-05-22 DIAGNOSIS — I10 ESSENTIAL HYPERTENSION: ICD-10-CM

## 2023-05-22 DIAGNOSIS — Z79.4 TYPE 2 DIABETES MELLITUS WITH HYPERGLYCEMIA, WITH LONG-TERM CURRENT USE OF INSULIN: Primary | ICD-10-CM

## 2023-05-22 DIAGNOSIS — E78.5 HYPERLIPIDEMIA LDL GOAL <100: ICD-10-CM

## 2023-05-22 DIAGNOSIS — Z79.4 INSULIN LONG-TERM USE: ICD-10-CM

## 2023-05-22 LAB
ALBUMIN SERPL-MCNC: 4.5 G/DL (ref 3.5–5.2)
ALBUMIN UR-MCNC: 3.9 MG/DL
ALBUMIN/GLOB SERPL: 1.5 G/DL
ALP SERPL-CCNC: 94 U/L (ref 39–117)
ALT SERPL W P-5'-P-CCNC: 21 U/L (ref 1–41)
ANION GAP SERPL CALCULATED.3IONS-SCNC: 12.3 MMOL/L (ref 5–15)
AST SERPL-CCNC: 19 U/L (ref 1–40)
BILIRUB SERPL-MCNC: 0.9 MG/DL (ref 0–1.2)
BUN SERPL-MCNC: 20 MG/DL (ref 8–23)
BUN/CREAT SERPL: 22.2 (ref 7–25)
CALCIUM SPEC-SCNC: 9.3 MG/DL (ref 8.6–10.5)
CHLORIDE SERPL-SCNC: 98 MMOL/L (ref 98–107)
CHOLEST SERPL-MCNC: 125 MG/DL (ref 0–200)
CO2 SERPL-SCNC: 26.7 MMOL/L (ref 22–29)
CREAT SERPL-MCNC: 0.9 MG/DL (ref 0.76–1.27)
CREAT UR-MCNC: 72.8 MG/DL
EGFRCR SERPLBLD CKD-EPI 2021: 94.8 ML/MIN/1.73
EXPIRATION DATE: NORMAL
EXPIRATION DATE: NORMAL
GLOBULIN UR ELPH-MCNC: 3.1 GM/DL
GLUCOSE BLDC GLUCOMTR-MCNC: 113 MG/DL (ref 70–130)
GLUCOSE SERPL-MCNC: 97 MG/DL (ref 65–99)
HBA1C MFR BLD: 5.9 %
HDLC SERPL-MCNC: 40 MG/DL (ref 40–60)
LDLC SERPL CALC-MCNC: 54 MG/DL (ref 0–100)
LDLC/HDLC SERPL: 1.18 {RATIO}
Lab: NORMAL
Lab: NORMAL
MICROALBUMIN/CREAT UR: 53.6 MG/G
POTASSIUM SERPL-SCNC: 4.1 MMOL/L (ref 3.5–5.2)
PROT SERPL-MCNC: 7.6 G/DL (ref 6–8.5)
SODIUM SERPL-SCNC: 137 MMOL/L (ref 136–145)
TRIGL SERPL-MCNC: 189 MG/DL (ref 0–150)
TSH SERPL DL<=0.05 MIU/L-ACNC: 1.85 UIU/ML (ref 0.27–4.2)
VLDLC SERPL-MCNC: 31 MG/DL (ref 5–40)

## 2023-05-22 PROCEDURE — 1159F MED LIST DOCD IN RCRD: CPT | Performed by: INTERNAL MEDICINE

## 2023-05-22 PROCEDURE — 3044F HG A1C LEVEL LT 7.0%: CPT | Performed by: INTERNAL MEDICINE

## 2023-05-22 PROCEDURE — 80061 LIPID PANEL: CPT | Performed by: INTERNAL MEDICINE

## 2023-05-22 PROCEDURE — 99214 OFFICE O/P EST MOD 30 MIN: CPT | Performed by: INTERNAL MEDICINE

## 2023-05-22 PROCEDURE — 3075F SYST BP GE 130 - 139MM HG: CPT | Performed by: INTERNAL MEDICINE

## 2023-05-22 PROCEDURE — 1160F RVW MEDS BY RX/DR IN RCRD: CPT | Performed by: INTERNAL MEDICINE

## 2023-05-22 PROCEDURE — 84443 ASSAY THYROID STIM HORMONE: CPT | Performed by: INTERNAL MEDICINE

## 2023-05-22 PROCEDURE — 82043 UR ALBUMIN QUANTITATIVE: CPT | Performed by: INTERNAL MEDICINE

## 2023-05-22 PROCEDURE — 83036 HEMOGLOBIN GLYCOSYLATED A1C: CPT | Performed by: INTERNAL MEDICINE

## 2023-05-22 PROCEDURE — 3052F HG A1C>EQUAL 8.0%<EQUAL 9.0%: CPT | Performed by: INTERNAL MEDICINE

## 2023-05-22 PROCEDURE — 82947 ASSAY GLUCOSE BLOOD QUANT: CPT | Performed by: INTERNAL MEDICINE

## 2023-05-22 PROCEDURE — 3046F HEMOGLOBIN A1C LEVEL >9.0%: CPT | Performed by: INTERNAL MEDICINE

## 2023-05-22 PROCEDURE — 3079F DIAST BP 80-89 MM HG: CPT | Performed by: INTERNAL MEDICINE

## 2023-05-22 PROCEDURE — 82570 ASSAY OF URINE CREATININE: CPT | Performed by: INTERNAL MEDICINE

## 2023-05-22 PROCEDURE — 3051F HG A1C>EQUAL 7.0%<8.0%: CPT | Performed by: INTERNAL MEDICINE

## 2023-05-22 PROCEDURE — 80053 COMPREHEN METABOLIC PANEL: CPT | Performed by: INTERNAL MEDICINE

## 2023-05-22 PROCEDURE — 36415 COLL VENOUS BLD VENIPUNCTURE: CPT | Performed by: INTERNAL MEDICINE

## 2023-05-22 NOTE — ASSESSMENT & PLAN NOTE
Diabetes is unchanged.   Continue current treatment regimen.  Diabetes will be reassessed in 3 months.    He asks about DexCom G7.  Will send Rx to see if this is covered.

## 2023-05-22 NOTE — PROGRESS NOTES
"     Office Note      Date: 2023  Patient Name: Abdirahman Mclean  MRN: [id number]  : 1960    Chief Complaint   Patient presents with   • Diabetes     Type II       History of Present Illness:   Abdirahman Mclean is a 63 y.o. male who presents for Diabetes type 2. Diagnosed in: . Treated in past with oral agents. Current treatments: tradjenta, metformin, jardiance and basal insulin. Number of insulin shots per day: 1. Checks blood sugar  time a day. Has low blood sugar: no. Aspirin use: Yes. Statin use: Yes. ACE-I/ARB use: Yes. Changes in health since last visit: none. Last eye exam: 10/2021.    Subjective      Diabetic Complications:  Eyes: No  Kidneys: No  Feet: No  Heart: Yes - cardiomyopathy    Diet and Exercise:  Meals per day: 2  Minutes of exercise per week: 70 mins.    Review of Systems:   Review of Systems   Constitutional: Negative.    Cardiovascular: Negative.    Gastrointestinal: Negative.    Endocrine: Negative.        The following portions of the patient's history were reviewed and updated as appropriate: allergies, current medications, past family history, past medical history, past social history, past surgical history and problem list.    Objective       Visit Vitals  /88 (BP Location: Left arm, Patient Position: Sitting, Cuff Size: Adult)   Pulse 65   Ht 175.3 cm (69\")   Wt 112 kg (246 lb)   SpO2 95%   BMI 36.33 kg/m²       Physical Exam:  Physical Exam  Constitutional:       Appearance: Normal appearance.   Cardiovascular:      Pulses:           Dorsalis pedis pulses are 2+ on the right side and 2+ on the left side.        Posterior tibial pulses are 2+ on the right side and 2+ on the left side.   Feet:      Right foot:      Protective Sensation: 5 sites tested. 5 sites sensed.      Skin integrity: Skin integrity normal.      Toenail Condition: Right toenails are abnormally thick. Fungal disease present.     Left foot:      Protective Sensation: 5 sites tested. 5 sites " sensed.      Skin integrity: Skin integrity normal.      Toenail Condition: Left toenails are abnormally thick. Fungal disease present.  Neurological:      Mental Status: He is alert.         Labs:    HbA1c  Lab Results   Component Value Date    HGBA1C 5.9 05/22/2023       CMP  Lab Results   Component Value Date    GLUCOSE 124 (H) 04/05/2022    BUN 10 04/05/2022    CREATININE 0.83 04/05/2022    EGFRIFNONA 89 12/15/2020    BCR 12.0 04/05/2022    K 4.2 04/05/2022    CO2 24.0 04/05/2022    CALCIUM 8.9 04/05/2022    AST 23 04/05/2022    ALT 32 04/05/2022        Lipid Panel  Lab Results   Component Value Date    HDL 33 (L) 04/05/2022    LDL 45 04/05/2022    TRIG 323 (H) 04/05/2022        TSH  Lab Results   Component Value Date    TSH 2.260 04/05/2022        Hemoglobin A1C  Lab Results   Component Value Date    HGBA1C 5.9 05/22/2023        Microalbumin/Creatinine  Lab Results   Component Value Date    MALBCRERATIO  04/05/2022      Comment:      Unable to calculate    MICROALBUR <1.2 04/05/2022           Assessment / Plan      Assessment & Plan:  Diagnoses and all orders for this visit:    1. Type 2 diabetes mellitus with hyperglycemia, with long-term current use of insulin (Primary)  Assessment & Plan:  Diabetes is unchanged.   Continue current treatment regimen.  Diabetes will be reassessed in 3 months.    He asks about DexCom G7.  Will send Rx to see if this is covered.    Orders:  -     POC Glucose, Blood  -     POC Glycosylated Hemoglobin (Hb A1C)  -     Comprehensive Metabolic Panel; Future  -     Lipid Panel; Future  -     Microalbumin / Creatinine Urine Ratio - Urine, Clean Catch; Future  -     TSH; Future    2. Essential hypertension  Assessment & Plan:  Hypertension is unchanged.  Continue current treatment regimen.  Blood pressure will be reassessed at the next regular appointment.      3. Hyperlipidemia LDL goal <100  Assessment & Plan:  Continue statin.  Check lipids today.      4. Non-ischemic  cardiomyopathy  Assessment & Plan:  On entresto and SGLT-2 inhibitor.      5. Insulin long-term use    Current Outpatient Medications   Medication Instructions   • allopurinol (ZYLOPRIM) 100 mg, Oral, Daily   • aspirin 81 mg, Oral, Daily   • busPIRone (BUSPAR) 10 mg, Oral, 2 Times Daily   • carvedilol (COREG) 12.5 mg, Oral, Daily   • cetirizine (ZYRTEC) 10 mg, Oral, Daily   • citalopram (CELEXA) 10 mg, Oral, Daily   • Easy Touch Test test strip 1 each, Other, Daily, ICD-10 E11.65   • empagliflozin (JARDIANCE) 10 mg, Oral, Daily   • Insulin Pen Needle (BD Pen Needle Cristy U/F) 32G X 4 MM misc 1 each, Does not apply, Daily   • linagliptin (TRADJENTA) 5 mg, Oral, Daily   • metFORMIN ER (GLUCOPHAGE-XR) 1,000 mg, Oral, 2 Times Daily   • montelukast (SINGULAIR) 10 mg, Oral, Nightly   • pantoprazole (PROTONIX) 40 mg, Oral, Daily   • rosuvastatin (CRESTOR) 10 mg, Oral, Daily   • sacubitril-valsartan (Entresto) 24-26 MG tablet 1 tablet, Oral, 2 Times Daily   • spironolactone (ALDACTONE) 50 mg, Oral, Daily   • tamsulosin (FLOMAX) 0.4 MG capsule 24 hr capsule 1 capsule, Oral, Nightly   • torsemide (DEMADEX) 5 mg, Oral, Daily   • Toujeo Max SoloStar 70 Units, Subcutaneous, Daily   • traZODone (DESYREL) 50 mg, Oral, Every Night at Bedtime   • TRUEplus Lancets 30G misc USE TO CHECK BLOOD GLUCOSE LEVEL TWICE DAILY   • Ventolin  (90 Base) MCG/ACT inhaler As Needed      Return in about 3 months (around 8/22/2023) for Recheck with A1c.    Hugo Andrade MD   05/22/2023

## 2023-05-23 ENCOUNTER — HOSPITAL ENCOUNTER (OUTPATIENT)
Dept: HOSPITAL 22 - PT | Age: 63
Discharge: HOME | End: 2023-05-23
Payer: MEDICARE

## 2023-05-23 DIAGNOSIS — M25.561: Primary | ICD-10-CM

## 2023-05-23 PROCEDURE — 97110 THERAPEUTIC EXERCISES: CPT

## 2023-05-23 PROCEDURE — 97530 THERAPEUTIC ACTIVITIES: CPT

## 2023-05-23 PROCEDURE — 97163 PT EVAL HIGH COMPLEX 45 MIN: CPT

## 2023-06-14 ENCOUNTER — TELEPHONE (OUTPATIENT)
Dept: ENDOCRINOLOGY | Facility: CLINIC | Age: 63
End: 2023-06-14
Payer: MEDICARE

## 2023-06-14 NOTE — TELEPHONE ENCOUNTER
Patient called the office, stated that last night at midnight while he was laying in bed his Dexcom went off at midnight saying his blood sugar was 60. He said he got up and ate a banana and went back to bed. He said this morning at 8, he checked it and his blood sugar was 120. He said that was after breakfast. He is wanting to know if he needs to decrease his insulin. He would like a call back. Thank you!

## 2023-07-03 ENCOUNTER — HOSPITAL ENCOUNTER (OUTPATIENT)
Age: 63
End: 2023-07-03
Payer: MEDICARE

## 2023-07-03 DIAGNOSIS — M79.672: Primary | ICD-10-CM

## 2023-07-03 DIAGNOSIS — M25.511: ICD-10-CM

## 2023-07-03 DIAGNOSIS — M79.662: ICD-10-CM

## 2023-07-03 PROCEDURE — 73030 X-RAY EXAM OF SHOULDER: CPT

## 2023-07-03 PROCEDURE — 73630 X-RAY EXAM OF FOOT: CPT

## 2023-07-03 PROCEDURE — 73590 X-RAY EXAM OF LOWER LEG: CPT

## 2023-07-23 PROCEDURE — 93296 REM INTERROG EVL PM/IDS: CPT | Performed by: INTERNAL MEDICINE

## 2023-07-23 PROCEDURE — 93295 DEV INTERROG REMOTE 1/2/MLT: CPT | Performed by: INTERNAL MEDICINE

## 2023-08-09 ENCOUNTER — OFFICE VISIT (OUTPATIENT)
Dept: CARDIOLOGY | Facility: CLINIC | Age: 63
End: 2023-08-09
Payer: MEDICARE

## 2023-08-09 VITALS
OXYGEN SATURATION: 98 % | SYSTOLIC BLOOD PRESSURE: 108 MMHG | HEART RATE: 68 BPM | DIASTOLIC BLOOD PRESSURE: 72 MMHG | BODY MASS INDEX: 36.53 KG/M2 | WEIGHT: 246.6 LBS | HEIGHT: 69 IN

## 2023-08-09 DIAGNOSIS — I10 ESSENTIAL HYPERTENSION: ICD-10-CM

## 2023-08-09 DIAGNOSIS — I50.22 CHRONIC SYSTOLIC CONGESTIVE HEART FAILURE, NYHA CLASS 3: ICD-10-CM

## 2023-08-09 DIAGNOSIS — I42.8 NON-ISCHEMIC CARDIOMYOPATHY: Primary | ICD-10-CM

## 2023-08-09 DIAGNOSIS — I48.0 PAROXYSMAL A-FIB: ICD-10-CM

## 2023-08-09 NOTE — PROGRESS NOTES
Abdirahman Bro Vinay  1960  [phone]    08/09/2023    [facility] CARDIOLOGY     Referring Provider: No ref. provider found     Dallas Crespo MD  [address] [place] [address] [facility] [address]    Chief Complaint   Patient presents with    Non-ischemic cardiomyopathy       Problem List:      Nonischemic Cardiomyopathy  CHF acute EF per [facility] reportedly <30%  12/16 bnp 480  2008 [facility] wnl EF 60  2/17 [facility] nl cors EF 20%  6/27/17 SJM ICD implant  ER 2/2019 NSR/ST, PAC  Echo 2/2019 LVEF 35-40%  Echo 3/4/2020: 35%  Echocardiogram 3/20/2022 EF 35%  DM -A1c11- 8.3 2018              Onset 2011  HTN  HL-on statin  2016 total chol 104  TAMAR- cpap intolerant  Palpitations/PAF:  12/16 holter <1% pvc, <1% pac   Noted short episodes on device interrogation   7. Chronically elevated cpk 4-500      Allergies  Allergies   Allergen Reactions    Other GI Intolerance     All pain medications cause constipation       Current Medications    Current Outpatient Medications:     allopurinol (ZYLOPRIM) 100 MG tablet, Take 1 tablet by mouth Daily., Disp: , Rfl:     aspirin 81 MG chewable tablet, Chew 1 tablet Daily., Disp: , Rfl:     busPIRone (BUSPAR) 10 MG tablet, Take 1 tablet by mouth 2 (Two) Times a Day., Disp: , Rfl:     carvedilol (COREG) 12.5 MG tablet, Take 1 tablet by mouth Daily., Disp: 90 tablet, Rfl: 3    cetirizine (zyrTEC) 10 MG tablet, Take 1 tablet by mouth Daily., Disp: , Rfl:     citalopram (CeleXA) 10 MG tablet, Take 2 tablets by mouth Daily., Disp: , Rfl:     Diclofenac Sodium (VOLTAREN) 1 % gel gel, As Needed., Disp: , Rfl:     Easy Touch Test test strip, 1 each by Other route Daily. ICD-10 E11.65, Disp: 100 each, Rfl: 3    empagliflozin (Jardiance) 10 MG tablet tablet, Take 1 tablet by mouth Daily., Disp: 90 tablet, Rfl: 3    Insulin Pen Needle (BD Pen Needle Cristy U/F) 32G X 4 MM misc, 1 each Daily., Disp: 100 each, Rfl: 3    linagliptin (Tradjenta) 5 MG tablet tablet, Take 1 tablet by mouth  "Daily., Disp: 90 tablet, Rfl: 3    metFORMIN ER (GLUCOPHAGE-XR) 500 MG 24 hr tablet, Take 2 tablets by mouth 2 (Two) Times a Day., Disp: 360 tablet, Rfl: 3    montelukast (SINGULAIR) 10 MG tablet, Take 1 tablet by mouth Every Night., Disp: , Rfl:     pantoprazole (PROTONIX) 40 MG EC tablet, Take 1 tablet by mouth Daily., Disp: , Rfl:     rosuvastatin (Crestor) 10 MG tablet, Take 1 tablet by mouth Daily., Disp: 90 tablet, Rfl: 3    sacubitril-valsartan (Entresto) 24-26 MG tablet, Take 1 tablet by mouth 2 (Two) Times a Day., Disp: 60 tablet, Rfl: 6    spironolactone (ALDACTONE) 50 MG tablet, Take 1 tablet by mouth Daily., Disp: 90 tablet, Rfl: 3    tamsulosin (FLOMAX) 0.4 MG capsule 24 hr capsule, Take 1 capsule by mouth Every Night., Disp: , Rfl:     torsemide (DEMADEX) 5 MG tablet, Take 1 tablet by mouth Daily., Disp: 90 tablet, Rfl: 3    Toujeo Max SoloStar 300 UNIT/ML solution pen-injector injection, inject 70 UNITS SUBCUTANEOUSLY into THE appropriate AREA EVERY DAY AS DIRECTED, Disp: 9 mL, Rfl: 2    traZODone (DESYREL) 50 MG tablet, Take 1 tablet by mouth every night at bedtime., Disp: , Rfl:     TRUEplus Lancets 30G misc, USE TO CHECK BLOOD GLUCOSE LEVEL TWICE DAILY, Disp: , Rfl:     Ventolin  (90 Base) MCG/ACT inhaler, As Needed., Disp: , Rfl:     History of Present Illness     Pt presents for follow up of DCM/CHF/HTN. Since we last saw the pt, pt denies any AF episodes, SOB, CP, LH, and dizziness. Denies any hospitalizations, ER visits, bleeding, or TIA/CVA symptoms. Overall feels well.  Blood pressure has been stable at home.  No ICD discharges.  No near-syncope or syncope.        Vitals:    08/09/23 1414   BP: 108/72   BP Location: Left arm   Patient Position: Sitting   Pulse: 68   SpO2: 98%   Weight: 112 kg (246 lb 9.6 oz)   Height: 175.3 cm (69\")     Body mass index is 36.42 kg/mý.  PE:  General: NAD  Neck: no JVD, no carotid bruits, no TM  Heart RRR, NL S1, S2, S4 present, no rubs, murmurs  Lungs: " CTA, no wheezes, rhonchi, or rales  Abd: soft, non-tender, NL BS  Ext: No musculoskeletal deformities, no edema, cyanosis, or clubbing  Psych: normal mood and affect    Diagnostic Data:    Device interrogation: [employer] single-chamber ICD VVI at 40 RV paced less than 1%.  Normal R wave 12.0 mV.  Normal threshold and impedances.  Battery voltage is 5 years remaining.  No arrhythmias noted.    Procedures           1. Non-ischemic cardiomyopathy    2. Chronic systolic congestive heart failure, NYHA class 3    3. Paroxysmal A-fib    4. Essential hypertension          Plan:    1. NICM/CHF: on appropriate meds: no changes for now  - EF 20% in 2017, now LVEF 35% , on GDMT  - ICD function normal  - Class I-II symptoms, doing well on Entresto 24/26 mg BID, carvedilol 12.5 mg BID, Aldactone 50 mg daily, Jardiance 10 mg daily.      2.  Nonsustained PAF   - no recurrences.     3. HTN:   -BP well controlled, continue medications       F/up in 6 months

## 2023-08-25 RX ORDER — SACUBITRIL AND VALSARTAN 24; 26 MG/1; MG/1
TABLET, FILM COATED ORAL
Qty: 180 TABLET | Refills: 2 | Status: SHIPPED | OUTPATIENT
Start: 2023-08-25

## 2023-09-27 ENCOUNTER — OFFICE VISIT (OUTPATIENT)
Dept: ORTHOPEDIC SURGERY | Facility: CLINIC | Age: 63
End: 2023-09-27
Payer: MEDICARE

## 2023-09-27 VITALS
WEIGHT: 249 LBS | DIASTOLIC BLOOD PRESSURE: 78 MMHG | HEIGHT: 69 IN | BODY MASS INDEX: 36.88 KG/M2 | SYSTOLIC BLOOD PRESSURE: 122 MMHG

## 2023-09-27 DIAGNOSIS — M12.811 ROTATOR CUFF ARTHROPATHY, RIGHT: Primary | ICD-10-CM

## 2023-09-27 RX ORDER — ROPIVACAINE HYDROCHLORIDE 5 MG/ML
4 INJECTION, SOLUTION EPIDURAL; INFILTRATION; PERINEURAL
Status: COMPLETED | OUTPATIENT
Start: 2023-09-27 | End: 2023-09-27

## 2023-09-27 RX ORDER — TRIAMCINOLONE ACETONIDE 40 MG/ML
40 INJECTION, SUSPENSION INTRA-ARTICULAR; INTRAMUSCULAR
Status: COMPLETED | OUTPATIENT
Start: 2023-09-27 | End: 2023-09-27

## 2023-09-27 RX ADMIN — TRIAMCINOLONE ACETONIDE 40 MG: 40 INJECTION, SUSPENSION INTRA-ARTICULAR; INTRAMUSCULAR at 09:00

## 2023-09-27 RX ADMIN — ROPIVACAINE HYDROCHLORIDE 4 ML: 5 INJECTION, SOLUTION EPIDURAL; INFILTRATION; PERINEURAL at 09:00

## 2023-09-27 NOTE — PROGRESS NOTES
[facility] Note    Subjective     Chief Complaint   Patient presents with    Right Shoulder - Pain        HPI    Abdirahman Mclean is a 63 y.o. male who presents with new problem of: right shoulder pain.  Onset: mechanical fall. The issue has been ongoing for 1 month(s). Pain is a 6/10 on the pain scale. Pain is described as burning and throbbing. Associated symptoms include pain and stiffness. The pain is worse with sleeping and any movement of the joint; sitting, ice, and heat improve the pain. Previous treatments have included: bracing and physical therapy.       I have reviewed the following portions of the patient's history and agree with: History of Present Illness and Review of Systems    Patient Active Problem List   Diagnosis    Non-cardiac chest pain    Essential hypertension    Hyperlipidemia LDL goal <100    Obesity    GERD (gastroesophageal reflux disease)    Colon cancer    Non-ischemic cardiomyopathy    Type 2 diabetes mellitus with hyperglycemia, with long-term current use of insulin    Systolic congestive heart failure, NYHA class 3    ICD (implantable cardioverter-defibrillator) in place    Paroxysmal A-fib    Uncontrolled type 2 diabetes mellitus with hyperglycemia    Insulin long-term use     Past Medical History:   Diagnosis Date    Acid reflux     Anxiety     Arthritis of back 2000    Cardiomyopathy     Colon cancer     S/P RESECTION     Coronary syndrome, acute     Diabetes     DX 2013, NO FSBS    Diabetes mellitus type I     GERD (gastroesophageal reflux disease)     Gout     History of colon cancer     History of heart disease     Hyperlipidemia     Hypertension     Hypertensive disorder     Long term current use of insulin     Mixed hyperlipidemia     Obesity     body mass index 30+-obesity    On home O2     at bedtime    TAMAR (obstructive sleep apnea)     NO CPAP USE    Osteoarthritis     Oxygen dependent     O2 2L NC EVERY NIGHT    Type 2 diabetes mellitus,  uncontrolled     Urinary frequency     Wears glasses       Past Surgical History:   Procedure Laterality Date    APPENDECTOMY      BACK SURGERY      3 times 05/13/08 states x2    CARDIAC CATHETERIZATION N/A 02/13/2017    Procedure: Left Heart Cath;  Surgeon: Alhaji Robertson MD;  Location:  [facility];  Service:     CARDIAC DEFIBRILLATOR PLACEMENT      CARDIAC ELECTROPHYSIOLOGY PROCEDURE N/A 06/27/2017    Procedure: VVI ICD Implant vs S-ICD BSC;  Surgeon: David Garcia MD;  Location:  [facility];  Service:     CARPAL TUNNEL RELEASE Left     CHOLECYSTECTOMY      COLON RESECTION      COLONOSCOPY      TONSILLECTOMY      TYMPANOTOMY        Family History   Problem Relation Age of Onset    Hypertension Mother     No Known Problems Father     Hypertension Brother     Hypertension Brother     Diabetes Maternal Aunt      Social History     Socioeconomic History    Marital status:    Tobacco Use    Smoking status: Never     Passive exposure: Never    Smokeless tobacco: Never   Vaping Use    Vaping Use: Never used   Substance and Sexual Activity    Alcohol use: Never    Drug use: Never    Sexual activity: Yes     Partners: Female     Birth control/protection: Post-menopausal, Vasectomy      Current Outpatient Medications on File Prior to Visit   Medication Sig Dispense Refill    allopurinol (ZYLOPRIM) 100 MG tablet Take 1 tablet by mouth Daily.      aspirin 81 MG chewable tablet Chew 1 tablet Daily.      busPIRone (BUSPAR) 10 MG tablet Take 1 tablet by mouth 2 (Two) Times a Day.      carvedilol (COREG) 12.5 MG tablet Take 1 tablet by mouth Daily. 90 tablet 3    cetirizine (zyrTEC) 10 MG tablet Take 1 tablet by mouth Daily.      citalopram (CeleXA) 10 MG tablet Take 2 tablets by mouth Daily.      Diclofenac Sodium (VOLTAREN) 1 % gel gel As Needed.      Easy Touch Test test strip 1 each by Other route Daily. ICD-10 E11.65 100 each 3    empagliflozin (Jardiance) 10 MG tablet  tablet Take 1 tablet by mouth Daily. 90 tablet 3    Insulin Pen Needle (BD Pen Needle Cristy U/F) 32G X 4 MM misc 1 each Daily. 100 each 3    linagliptin (Tradjenta) 5 MG tablet tablet Take 1 tablet by mouth Daily. 90 tablet 3    metFORMIN ER (GLUCOPHAGE-XR) 500 MG 24 hr tablet Take 2 tablets by mouth 2 (Two) Times a Day. 360 tablet 3    montelukast (SINGULAIR) 10 MG tablet Take 1 tablet by mouth Every Night.      pantoprazole (PROTONIX) 40 MG EC tablet Take 1 tablet by mouth Daily.      rosuvastatin (Crestor) 10 MG tablet Take 1 tablet by mouth Daily. 90 tablet 3    sacubitril-valsartan (Entresto) 24-26 MG tablet TAKE ONE TABLET BY MOUTH TWICE DAILY 180 tablet 2    spironolactone (ALDACTONE) 50 MG tablet Take 1 tablet by mouth Daily. 90 tablet 3    tamsulosin (FLOMAX) 0.4 MG capsule 24 hr capsule Take 1 capsule by mouth Every Night.      torsemide (DEMADEX) 5 MG tablet Take 1 tablet by mouth Daily. 90 tablet 3    Toujeo Max SoloStar 300 UNIT/ML solution pen-injector injection inject 70 UNITS SUBCUTANEOUSLY into THE appropriate AREA EVERY DAY AS DIRECTED 9 mL 2    traZODone (DESYREL) 50 MG tablet Take 1 tablet by mouth every night at bedtime.      TRUEplus Lancets 30G misc USE TO CHECK BLOOD GLUCOSE LEVEL TWICE DAILY      Ventolin  (90 Base) MCG/ACT inhaler As Needed.       No current facility-administered medications on file prior to visit.      Allergies   Allergen Reactions    Other GI Intolerance     All pain medications cause constipation        Review of Systems   Constitutional: Negative.    HENT: Negative.     Eyes: Negative.    Respiratory: Negative.     Cardiovascular: Negative.    Gastrointestinal: Negative.    Endocrine: Negative.    Genitourinary: Negative.    Musculoskeletal:  Positive for arthralgias.   Skin: Negative.    Allergic/Immunologic: Negative.    Neurological: Negative.    Hematological: Negative.    Psychiatric/Behavioral: Negative.        Objective      Physical Exam  /78   Ht  "175.9 cm (69.25\")   Wt 113 kg (249 lb)   BMI 36.51 kg/m²     Body mass index is 36.51 kg/m².    General:   Mental Status:  Alert   Appearance: Cooperative, in no acute distress   Build and Nutrition: Overweight by BMI male   Orientation: Alert and oriented to person, place and time   Posture: Normal   Gait: Nonantalgic    Integument:   Right shoulder: No skin lesions, no rash, no ecchymosis    Upper Extremities:   Right Shoulder:    Tenderness:  None    Swelling:  None    Range of motion:  External rotation:  20°       Forward flexion:  160°       Abduction:   160°  Deformities:  None  Functional testing: Positive drop arm, negative lift-off, positive impingement  Deltoid, biceps, triceps and interossei intact      Imaging/Studies  Imaging Results (Last 24 Hours)       Procedure Component Value Units Date/Time    XR Shoulder 2+ View Right [[id number]] Resulted: 09/27/23 0849     Updated: 09/27/23 0850    Narrative:      Right Shoulder Radiographs  Indication: right shoulder pain  Views: AP, outlet and axillary views of the right shoulder    Comparison: no prior studies available for review    Findings:  High riding humeral head, glenohumeral degenerative changes, no acute bony   abnormalities.  Posterior glenoid erosion.  No unusual bony features.                Assessment and Plan     Diagnoses and all orders for this visit:    1. Rotator cuff arthropathy, right (Primary)  -     XR Shoulder 2+ View Right  -     - Large Joint Arthrocentesis: R glenohumeral        1. Rotator cuff arthropathy, right        I reviewed my findings with the patient.  We discussed options for his right shoulder today, and he would prefer a trial of an intra-articular shoulder injection, which was provided today.  He had a similar problem in his left shoulder in the past, and responded well to an injection.  I will see him back in 3 months for checkup, but I will be happy to see him back sooner for any problems.  If he still has issues " going forward, I will likely send him to see Dr. Ramirez for an evaluation.    Procedure Note:  The potential benefits of performing a therapeutic left shoulder glenohumeral joint injection, as well as potential risks (including, but not limited to infection, swelling, pain, bleeding, bruising, nerve/blood vessel damage, skin color changes, transient elevation in blood glucose levels, and fat atrophy) were discussed with the patient.  After informed consent, timeout procedure was performed, and the skin on the left shoulder was prepped with chlorhexidine soap and alcohol, after which ethyl chloride was applied to the skin at the injection site. Via the posterior approach, 1ml of Kenalog 40mg/ml mixed with 4ml 0.5% ropivacaine plain was injected into the shoulder joint.  The patient tolerated the procedure well, experiencing 50% improvement a few minutes following the injection. There were no complications.  Band-Aid was applied to the injection site. Post-procedural instructions were given to the patient and/or their caregiver.      Return in about 3 months (around 12/27/2023).      Vazquez Montez MD  09/27/23  09:17 EDT

## 2023-09-27 NOTE — PROGRESS NOTES
Procedure   - Large Joint Arthrocentesis: R glenohumeral on 9/27/2023 9:00 AM  Indications: pain  Details: 21 G needle, posterior approach  Medications: 40 mg triamcinolone acetonide 40 MG/ML; 4 mL ropivacaine 0.5 %  Outcome: tolerated well, no immediate complications  Procedure, treatment alternatives, risks and benefits explained, specific risks discussed. Consent was given by the patient. Immediately prior to procedure a time out was called to verify the correct patient, procedure, equipment, support staff and site/side marked as required. Patient was prepped and draped in the usual sterile fashion.

## 2023-10-02 ENCOUNTER — HOSPITAL ENCOUNTER (OUTPATIENT)
Dept: HOSPITAL 22 - OT | Age: 63
Discharge: HOME | End: 2023-10-02
Payer: MEDICARE

## 2023-10-02 DIAGNOSIS — M25.511: Primary | ICD-10-CM

## 2023-10-02 PROCEDURE — G0283 ELEC STIM OTHER THAN WOUND: HCPCS

## 2023-10-02 PROCEDURE — 97140 MANUAL THERAPY 1/> REGIONS: CPT

## 2023-10-02 PROCEDURE — 97164 PT RE-EVAL EST PLAN CARE: CPT

## 2023-10-02 PROCEDURE — 97530 THERAPEUTIC ACTIVITIES: CPT

## 2023-10-02 PROCEDURE — 97110 THERAPEUTIC EXERCISES: CPT

## 2023-10-02 PROCEDURE — 97010 HOT OR COLD PACKS THERAPY: CPT

## 2023-10-02 PROCEDURE — 97166 OT EVAL MOD COMPLEX 45 MIN: CPT

## 2023-10-02 PROCEDURE — 97014 ELECTRIC STIMULATION THERAPY: CPT

## 2023-10-23 ENCOUNTER — HOSPITAL ENCOUNTER (OUTPATIENT)
Age: 63
End: 2023-10-23
Payer: MEDICARE

## 2023-10-23 DIAGNOSIS — E11.9: Primary | ICD-10-CM

## 2023-10-23 DIAGNOSIS — M1A.09X0: ICD-10-CM

## 2023-10-23 DIAGNOSIS — Z79.84: ICD-10-CM

## 2023-10-23 DIAGNOSIS — C18.9: ICD-10-CM

## 2023-10-23 DIAGNOSIS — Z00.00: ICD-10-CM

## 2023-10-23 LAB
ALBUMIN LEVEL: 4.3 G/DL (ref 3.5–5)
ALBUMIN/GLOB SERPL: 1.5 {RATIO} (ref 1.1–1.8)
ALP ISO SERPL-ACNC: 90 U/L (ref 38–126)
ALT SERPLBLD-CCNC: 25 U/L (ref 12–78)
ANION GAP SERPL CALC-SCNC: 14.3 MEQ/L (ref 5–15)
AST SERPL QL: 28 U/L (ref 17–59)
BILIRUBIN,TOTAL: 1.2 MG/DL (ref 0.2–1.3)
BUN SERPL-MCNC: 14 MG/DL (ref 9–20)
CALCIUM SPEC-MCNC: 8.8 MG/DL (ref 8.4–10.2)
CHLORIDE SPEC-SCNC: 102 MMOL/L (ref 98–107)
CHOLEST SPEC-SCNC: 124 MG/DL (ref 140–200)
CO2 SERPL-SCNC: 26 MMOL/L (ref 22–30)
CREAT BLD-SCNC: 0.8 MG/DL (ref 0.66–1.25)
ESTIMATED GLOMERULAR FILT RATE: 97 ML/MIN (ref 60–?)
GFR (AFRICAN AMERICAN): 117 ML/MIN (ref 60–?)
GLOBULIN SER CALC-MCNC: 2.9 G/DL (ref 1.3–3.2)
GLUCOSE: 116 MG/DL (ref 74–100)
HBA1C MFR BLD: 5.9 % (ref 4–6)
HCT VFR BLD CALC: 46.4 % (ref 42–52)
HDLC SERPL-MCNC: 36 MG/DL (ref 40–60)
HGB BLD-MCNC: 15.2 G/DL (ref 14.1–18)
MCHC RBC-ENTMCNC: 32.8 G/DL (ref 31.8–35.4)
MCV RBC: 91.1 FL (ref 80–94)
MEAN CORPUSCULAR HEMOGLOBIN: 29.9 PG (ref 27–31.2)
PLATELET # BLD: 205 K/MM3 (ref 142–424)
POTASSIUM: 4.3 MMOL/L (ref 3.5–5.1)
PROT SERPL-MCNC: 7.2 G/DL (ref 6.3–8.2)
RBC # BLD AUTO: 5.09 M/MM3 (ref 4.6–6.2)
SODIUM SPEC-SCNC: 138 MMOL/L (ref 136–145)
TRIGLYCERIDES: 229 MG/DL (ref 30–150)
URATE SERPL-SCNC: 4 MG/DL (ref 3.5–8.5)
WBC # BLD AUTO: 8.1 K/MM3 (ref 4.8–10.8)

## 2023-10-23 PROCEDURE — 83036 HEMOGLOBIN GLYCOSYLATED A1C: CPT

## 2023-10-23 PROCEDURE — 80061 LIPID PANEL: CPT

## 2023-10-23 PROCEDURE — 85025 COMPLETE CBC W/AUTO DIFF WBC: CPT

## 2023-10-23 PROCEDURE — 84550 ASSAY OF BLOOD/URIC ACID: CPT

## 2023-10-23 PROCEDURE — 80053 COMPREHEN METABOLIC PANEL: CPT

## 2023-10-23 PROCEDURE — 36415 COLL VENOUS BLD VENIPUNCTURE: CPT

## 2023-10-27 RX ORDER — INSULIN GLARGINE 300 U/ML
INJECTION, SOLUTION SUBCUTANEOUS
Qty: 21 ML | Refills: 0 | Status: SHIPPED | OUTPATIENT
Start: 2023-10-27

## 2023-10-27 NOTE — TELEPHONE ENCOUNTER
Rx Refill Note  Requested Prescriptions     Pending Prescriptions Disp Refills    Toujeo Max SoloStar 300 UNIT/ML solution pen-injector injection [Pharmacy Med Name: TOUJEO MAX SOLOSTAR 300U/ML PEN 3ML] 9 mL 2     Sig: INJECT 70 UNITS INTO THE APPROPRIATE AREA EVERY DAY AS DIRECTED        Last office visit with prescribing clinician: 5/22/2023      Next office visit with prescribing clinician: 10/31/2023       Neli Lyons (Jodi)  10/27/23, 10:39 EDT

## 2023-10-31 ENCOUNTER — OFFICE VISIT (OUTPATIENT)
Dept: ENDOCRINOLOGY | Facility: CLINIC | Age: 63
End: 2023-10-31
Payer: MEDICARE

## 2023-10-31 VITALS
BODY MASS INDEX: 36.43 KG/M2 | OXYGEN SATURATION: 94 % | SYSTOLIC BLOOD PRESSURE: 126 MMHG | HEIGHT: 69 IN | DIASTOLIC BLOOD PRESSURE: 78 MMHG | WEIGHT: 246 LBS | HEART RATE: 77 BPM

## 2023-10-31 DIAGNOSIS — I10 ESSENTIAL HYPERTENSION: ICD-10-CM

## 2023-10-31 DIAGNOSIS — E78.5 HYPERLIPIDEMIA LDL GOAL <100: ICD-10-CM

## 2023-10-31 DIAGNOSIS — I42.8 NON-ISCHEMIC CARDIOMYOPATHY: ICD-10-CM

## 2023-10-31 DIAGNOSIS — Z79.4 TYPE 2 DIABETES MELLITUS WITH HYPERGLYCEMIA, WITH LONG-TERM CURRENT USE OF INSULIN: Primary | ICD-10-CM

## 2023-10-31 DIAGNOSIS — E11.65 TYPE 2 DIABETES MELLITUS WITH HYPERGLYCEMIA, WITH LONG-TERM CURRENT USE OF INSULIN: Primary | ICD-10-CM

## 2023-10-31 LAB
EXPIRATION DATE: ABNORMAL
EXPIRATION DATE: NORMAL
GLUCOSE BLDC GLUCOMTR-MCNC: 108 MG/DL (ref 70–130)
HBA1C MFR BLD: 5.9 % (ref 4.5–5.7)
Lab: ABNORMAL
Lab: NORMAL

## 2023-10-31 PROCEDURE — 3074F SYST BP LT 130 MM HG: CPT | Performed by: INTERNAL MEDICINE

## 2023-10-31 PROCEDURE — 3044F HG A1C LEVEL LT 7.0%: CPT | Performed by: INTERNAL MEDICINE

## 2023-10-31 PROCEDURE — 99214 OFFICE O/P EST MOD 30 MIN: CPT | Performed by: INTERNAL MEDICINE

## 2023-10-31 PROCEDURE — 1159F MED LIST DOCD IN RCRD: CPT | Performed by: INTERNAL MEDICINE

## 2023-10-31 PROCEDURE — 83036 HEMOGLOBIN GLYCOSYLATED A1C: CPT | Performed by: INTERNAL MEDICINE

## 2023-10-31 PROCEDURE — 95251 CONT GLUC MNTR ANALYSIS I&R: CPT | Performed by: INTERNAL MEDICINE

## 2023-10-31 PROCEDURE — 3078F DIAST BP <80 MM HG: CPT | Performed by: INTERNAL MEDICINE

## 2023-10-31 PROCEDURE — 1160F RVW MEDS BY RX/DR IN RCRD: CPT | Performed by: INTERNAL MEDICINE

## 2023-10-31 RX ORDER — INSULIN GLARGINE 300 U/ML
INJECTION, SOLUTION SUBCUTANEOUS
Qty: 21 ML | Refills: 3 | Status: SHIPPED | OUTPATIENT
Start: 2023-10-31

## 2023-10-31 RX ORDER — METFORMIN HYDROCHLORIDE 500 MG/1
1000 TABLET, EXTENDED RELEASE ORAL 2 TIMES DAILY
Qty: 360 TABLET | Refills: 3 | Status: SHIPPED | OUTPATIENT
Start: 2023-10-31

## 2023-10-31 NOTE — ASSESSMENT & PLAN NOTE
Diabetes is unchanged.   Continue current treatment regimen.  Diabetes will be reassessed in 3 months.    DexCom G7 CGM was downloaded today.  Data was reviewed from 10/18/23 to 10/31/23.  This revealed good glucose control throughout the day and night.  No patterns for medication adjustment at this time.

## 2023-10-31 NOTE — PROGRESS NOTES
"     Office Note      Date: 10/31/2023  Patient Name: Abdirahman Mclean  MRN: [id number]  : 1960    Chief Complaint   Patient presents with    Diabetes       History of Present Illness:   Abdirahman Mclean is a 63 y.o. male who presents for Diabetes type 2. Diagnosed in: . Treated in past with oral agents. Current treatments: tradjenta, metformin, jardiance and basal insulin. Number of insulin shots per day: 1. Checks blood sugar  time a day. Has low blood sugar: no. Aspirin use: Yes. Statin use: Yes. ACE-I/ARB use: Yes. Changes in health since last visit: none. Last eye exam: 2023.     Subjective      Diabetic Complications:  Eyes: No  Kidneys: No  Feet: No  Heart: Yes - cardiomyopathy    Diet and Exercise:  Meals per day: 2  Minutes of exercise per week: 70 mins.    Review of Systems:   Review of Systems   Constitutional: Negative.    Cardiovascular: Negative.    Gastrointestinal: Negative.    Endocrine: Negative.        The following portions of the patient's history were reviewed and updated as appropriate: allergies, current medications, past family history, past medical history, past social history, past surgical history, and problem list.    Objective     Visit Vitals  /78   Pulse 77   Ht 175.3 cm (69\")   Wt 112 kg (246 lb)   SpO2 94%   BMI 36.33 kg/m²       Physical Exam:  Physical Exam  Constitutional:       Appearance: Normal appearance.   Neurological:      Mental Status: He is alert.         Labs:    HbA1c  Lab Results   Component Value Date    HGBA1C 5.9 (A) 10/31/2023       CMP  Lab Results   Component Value Date    GLUCOSE 97 2023    BUN 20 2023    CREATININE 0.90 2023    EGFRIFNONA 89 12/15/2020    BCR 22.2 2023    K 4.1 2023    CO2 26.7 2023    CALCIUM 9.3 2023    AST 19 2023    ALT 21 2023        Lipid Panel  Lab Results   Component Value Date    HDL 40 2023    LDL 54 2023    TRIG 189 (H) 2023        TSH  Lab " Results   Component Value Date    TSH 1.850 05/22/2023        Hemoglobin A1C  Lab Results   Component Value Date    HGBA1C 5.9 (A) 10/31/2023        Microalbumin/Creatinine  Lab Results   Component Value Date    MALBCRERATIO 53.6 05/22/2023    MICROALBUR 3.9 05/22/2023           Assessment / Plan      Assessment & Plan:  Diagnoses and all orders for this visit:    1. Type 2 diabetes mellitus with hyperglycemia, with long-term current use of insulin (Primary)  Assessment & Plan:  Diabetes is unchanged.   Continue current treatment regimen.  Diabetes will be reassessed in 3 months.    DexCom G7 CGM was downloaded today.  Data was reviewed from 10/18/23 to 10/31/23.  This revealed good glucose control throughout the day and night.  No patterns for medication adjustment at this time.      Orders:  -     POC Glucose, Blood  -     POC Glycosylated Hemoglobin (Hb A1C)    2. Essential hypertension  Assessment & Plan:  Hypertension is unchanged.  Continue current treatment regimen.  Blood pressure will be reassessed at the next regular appointment.      3. Hyperlipidemia LDL goal <100  Assessment & Plan:  Continue statin.      4. Non-ischemic cardiomyopathy  Assessment & Plan:  Continue entresto and SGLT-2 inhibitor.      Other orders  -     empagliflozin (Jardiance) 10 MG tablet tablet; Take 1 tablet by mouth Daily.  Dispense: 90 tablet; Refill: 3  -     linagliptin (Tradjenta) 5 MG tablet tablet; Take 1 tablet by mouth Daily.  Dispense: 90 tablet; Refill: 3  -     metFORMIN ER (GLUCOPHAGE-XR) 500 MG 24 hr tablet; Take 2 tablets by mouth 2 (Two) Times a Day.  Dispense: 360 tablet; Refill: 3  -     Insulin Glargine, 2 Unit Dial, (Toujeo Max SoloStar) 300 UNIT/ML solution pen-injector injection; Inject 70u sq q day  Dispense: 21 mL; Refill: 3      Current Outpatient Medications   Medication Instructions    allopurinol (ZYLOPRIM) 100 mg, Oral, Daily    aspirin 81 mg, Oral, Daily    busPIRone (BUSPAR) 10 mg, Oral, 2 Times Daily     carvedilol (COREG) 12.5 mg, Oral, Daily    cetirizine (ZYRTEC) 10 mg, Oral, Daily    citalopram (CELEXA) 20 mg, Oral, Daily    Diclofenac Sodium (VOLTAREN) 1 % gel gel As Needed    Easy Touch Test test strip 1 each, Other, Daily, ICD-10 E11.65    empagliflozin (JARDIANCE) 10 mg, Oral, Daily    Insulin Glargine, 2 Unit Dial, (Toujeo Max SoloStar) 300 UNIT/ML solution pen-injector injection Inject 70u sq q day    Insulin Pen Needle (BD Pen Needle Cristy U/F) 32G X 4 MM misc 1 each, Does not apply, Daily    linagliptin (TRADJENTA) 5 mg, Oral, Daily    metFORMIN ER (GLUCOPHAGE-XR) 1,000 mg, Oral, 2 Times Daily    montelukast (SINGULAIR) 10 mg, Oral, Nightly    pantoprazole (PROTONIX) 40 mg, Oral, Daily    rosuvastatin (CRESTOR) 10 mg, Oral, Daily    sacubitril-valsartan (Entresto) 24-26 MG tablet TAKE ONE TABLET BY MOUTH TWICE DAILY    spironolactone (ALDACTONE) 50 mg, Oral, Daily    tamsulosin (FLOMAX) 0.4 MG capsule 24 hr capsule 1 capsule, Oral, Nightly    torsemide (DEMADEX) 5 mg, Oral, Daily    traZODone (DESYREL) 50 mg, Oral, Every Night at Bedtime    TRUEplus Lancets 30G misc USE TO CHECK BLOOD GLUCOSE LEVEL TWICE DAILY    Ventolin  (90 Base) MCG/ACT inhaler As Needed      Return in about 3 months (around 1/31/2024) for Recheck with A1c.    Hugo Andrade MD   10/31/2023

## 2023-11-09 RX ORDER — INSULIN GLARGINE 300 U/ML
INJECTION, SOLUTION SUBCUTANEOUS
Qty: 21 ML | Refills: 3 | Status: SHIPPED | OUTPATIENT
Start: 2023-11-09

## 2023-11-09 NOTE — TELEPHONE ENCOUNTER
Rx Refill Note  Requested Prescriptions     Pending Prescriptions Disp Refills    Insulin Glargine, 2 Unit Dial, (Toujeo Max SoloStar) 300 UNIT/ML solution pen-injector injection [Pharmacy Med Name: TOUJEO MAX SOLOSTAR 300U/ML PEN 3ML] 21 mL 3     Sig: INJECT 70 UNITS SUBCUTANEOUSLY ONCE DAILY AS DIRECTED          Last office visit with prescribing clinician: 10/31/23    Next office visit with prescribing clinician: 3/19/.24        Elva Ledesma MA  11/09/23, 10:59 EST /

## 2023-11-27 ENCOUNTER — TELEPHONE (OUTPATIENT)
Dept: ENDOCRINOLOGY | Facility: CLINIC | Age: 63
End: 2023-11-27
Payer: MEDICARE

## 2023-11-27 NOTE — TELEPHONE ENCOUNTER
Caller: Abdirahman Mclean    Relationship: Self    Best call back number: [phone]    Requested Prescriptions: DEXCOM G7 SENSORS  Requested Prescriptions      No prescriptions requested or ordered in this encounter        Pharmacy where request should be sent:  PT IS UNSURE OF THE NAME OF THE COMPANY    Last office visit with prescribing clinician: 10/31/2023   Last telemedicine visit with prescribing clinician: Visit date not found   Next office visit with prescribing clinician: 3/19/2024     Additional details provided by patient: PT NEEDS A NEW SCRIPT SO HE CAN GET HIS DEXCOM G7 FILLED. PT IS OUT OF THE SENSORS COMPLETELY.     Does the patient have less than a 3 day supply:  [x] Yes  [] No    Would you like a call back once the refill request has been completed: [x] Yes [] No    If the office needs to give you a call back, can they leave a voicemail: [x] Yes [] No    Harvey Stein Rep   11/27/23 11:38 EST

## 2023-11-27 NOTE — TELEPHONE ENCOUNTER
Patient called office, stated that he called [facility] regarding a refill for his Dexom G7 sensors. They told him we needed to fax over forms for patient. Asked patient if they are going to be sending forms for us to fill out and fax back. He stated no, that they are just needing forms with updated information. He would like a call back.

## 2023-11-27 NOTE — TELEPHONE ENCOUNTER
Called and spoke with [facility].  They have not received our last office note that was faxed on 11/2/2023.  Office note re faxed and patient notified.

## 2024-01-01 NOTE — TELEPHONE ENCOUNTER
Patient called to report that he is tolerating his Entresto well and he plans to get lab work today.   This note is a summary of Isi Bishop's visit to the [place] [facility] at the [facility] on 2024. He was referred to our clinic by his pediatrician Dr. Bhargavi Gutierrez due to a positive result for cystic fibrosis on his [place]  screen.    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  A sweat chloride test was pursued, but a sufficient sample was unable to be collected today.  A repeat sweat chloride test is planned in three weeks.       Screening and Sweat Test Information:  Isi moran  screen was performed in two steps.  First, his level of immunoreactive trypsinogen (IRT) was tested.  This is a substance made by the pancreas that tends to be elevated in newborns with cystic fibrosis. Isi moran IRT level was found to be elevated, so the second step was to perform genetic testing for several dozen mutations (gene changes) in the gene for cystic fibrosis.  This gene is called CFTR. A mutation named R117H (7T/7T) was found in one of Isi moran two copies of the CFTR gene.  Because of these results, he was referred to our clinic to have a sweat test.  The sweat test is a test used to diagnose an individual with cystic fibrosis.    Cystic Fibrosis Inheritance  Cystic fibrosis is inherited in an autosomal recessive pattern, meaning a child must inherit a mutation in the CFTR gene from both their mother and father in order to have cystic fibrosis.  Isi has inherited at least one mutation, which indicates that he is a carrier for CF.  Today the family reported that Isi's paternal aunt was previously found to be a carrier for cystic fibrosis.  She was able to send the family a copy of her report which showed she also carries the R117H mutation.  Therefore it is most likely that Isi inherited this mutation from his father.      It is  recommended that both parents consider genetic carrier testing for cystic fibrosis so that it can be determined which parent, if not both, is a carrier.  This information could be helpful especially if additional pregnancies are planned. Carrier testing is performed through a blood test which looks for changes in the CFTR gene.  As we discussed, approximately 1 in 25 individuals of  ancestry are carriers of cystic fibrosis.     If only one parent is a carrier, then with each pregnancy together there is a 50% chance of having a child that is a carrier. This also means that there would also be a 50% chance of having a child that is not a carrier.  If both parents are carriers, then with each pregnancy together there is a 25% chance to have a child with cystic fibrosis.  With each pregnancy there is also a 50% chance to have a child that is a carrier of cystic fibrosis, and a 25% chance to have a child that is not a carrier and does not have cystic fibrosis.      Carrier testing may be done at a return appointment in the [facility] Clinic, or possibly through the local primary care physician. The parent s physician may feel comfortable ordering the testing, or they may refer the family to a genetic counselor. We would also be happy to assist them with ordering this testing at their facility. The R117H mutation is among the most common mutations and is expected to be found on most cystic fibrosis carrier screens, but its inclusion should be confirmed prior to proceeding.  There is also more comprehensive carrier screening available and the benefits and limitations of all options should be discussed with the family prior to proceeding.  Insurance pre-verification is also recommended.      Personal Medical History:  Isi was born at 36 weeks 4 days after a healthy pregnancy.  His parents have no concerns about weight gain, stools, or respiratory status.    Family History:  A detailed family history was obtained and  "scanned into Isi s medical record. It is significant for the following:  Isi is the third child of his parents together.  Isi has a 3-year-old sister who was conceived with the aid of in vitro fertilization and is healthy and a 2-year-old brother who is healthy.  Both of Isi's siblings had apparently normal  screens.    Isi's mother Nadya is 34-years-old and healthy.    Nadya has a maternal cousin with intellectual disability and autism.  A specific diagnosis is not known.  We discussed that this may be due to an underlying genetic condition and genetic evaluation and testing would be available to him.  This might have implications for his health as well as implications for family members to help determine their chances to have a child with the same condition.   Isi's father Mayank is 33-years-old and healthy.   Isi's paternal aunt is a known cystic fibrosis carrier with a single copy of the R117H mutation.  Isi's paternal grandfather  suddenly at age 51 due to a cardiac event.  He had a history of a bicuspid aortic valve and aortic enlargement.  I recommended that Mayank discuss this history and the possibility of an echocardiogram with his primary care provider.  The family history is negative for cystic fibrosis, severe asthma or allergies, recurrent respiratory infections, pancreatitis, or infertility.  Isi is of [place] and [place] ancestry on his maternal side and [place], [place], and English ancestry on his paternal side.  Consanguinity was denied.     Implications for Family Members  Cystic fibrosis is a genetic disorder and has implications for Isi s family members, and it is important that information about his  screen be shared.  While the  screen sometimes picks up carriers \"on accident\" it is not designed to do so.  Therefore Isi s siblings and paternal cousins could still be be carriers despite their normal  screens.  This possibility " and the option of carrier testing should be shared with them when they get older. If another family member is found to have a mutation for cystic fibrosis, it is recommended that their partner be tested to determine if he or she is also a carrier. If both members of the couple are carriers, then they could have a child with cystic fibrosis.     Conclusion  A sufficient sweat sample was unable to be collected today.  A repeat sweat chloride test is planned in three weeks.  The family was encouraged to contact me with any additional questions or concerns in the meantime.     Plan:   1. Repeat sweat chloride test is scheduled for Wednesday July 31      Lazara Robbins MS, [facility]  Genetic Counselor  [facility] [place] [facility]  [facility]     Time spent in consultation face to face was 40 minutes    CC  Patient Care Team    Copy to patient     [id number] [place] [id number]      .[email or web address][swecl

## 2024-01-03 ENCOUNTER — OFFICE VISIT (OUTPATIENT)
Dept: ORTHOPEDIC SURGERY | Facility: CLINIC | Age: 64
End: 2024-01-03
Payer: MEDICARE

## 2024-01-03 VITALS
BODY MASS INDEX: 36.46 KG/M2 | HEIGHT: 69 IN | DIASTOLIC BLOOD PRESSURE: 68 MMHG | SYSTOLIC BLOOD PRESSURE: 116 MMHG | WEIGHT: 246.2 LBS

## 2024-01-03 DIAGNOSIS — M12.811 ROTATOR CUFF ARTHROPATHY, RIGHT: Primary | ICD-10-CM

## 2024-01-03 PROCEDURE — 1159F MED LIST DOCD IN RCRD: CPT | Performed by: ORTHOPAEDIC SURGERY

## 2024-01-03 PROCEDURE — 3078F DIAST BP <80 MM HG: CPT | Performed by: ORTHOPAEDIC SURGERY

## 2024-01-03 PROCEDURE — 3074F SYST BP LT 130 MM HG: CPT | Performed by: ORTHOPAEDIC SURGERY

## 2024-01-03 PROCEDURE — 1160F RVW MEDS BY RX/DR IN RCRD: CPT | Performed by: ORTHOPAEDIC SURGERY

## 2024-01-03 PROCEDURE — 99213 OFFICE O/P EST LOW 20 MIN: CPT | Performed by: ORTHOPAEDIC SURGERY

## 2024-01-03 RX ORDER — CITALOPRAM 20 MG/1
1 TABLET ORAL DAILY
COMMUNITY
Start: 2023-11-20

## 2024-01-03 NOTE — PROGRESS NOTES
[facility] Note    Subjective     Chief Complaint   Patient presents with    Follow-up     3 month follow up - Rotator cuff arthropathy        HPI    It has been 3  month(s) since Mr. Mclean's last visit. He returns to clinic today for follow-up of right shoulder pain. The issue has been ongoing for 4 month(s). He rates his pain a 4/10 on the pain scale. Previous/current treatments: physical therapy and steroid injection (last injection 9/27/23). Current symptoms: popping. The pain is worse with lying on affected side; resting and sitting improve the pain. Overall, he is doing better.  Last injection did provide relief, but he still having intermittent pain in the shoulder.    I have reviewed the following portions of the patient's history and agree with: History of Present Illness and Review of Systems    Patient Active Problem List   Diagnosis    Non-cardiac chest pain    Essential hypertension    Hyperlipidemia LDL goal <100    Obesity    GERD (gastroesophageal reflux disease)    Colon cancer    Non-ischemic cardiomyopathy    Type 2 diabetes mellitus with hyperglycemia, with long-term current use of insulin    Systolic congestive heart failure, NYHA class 3    ICD (implantable cardioverter-defibrillator) in place    Paroxysmal A-fib    Uncontrolled type 2 diabetes mellitus with hyperglycemia    Insulin long-term use     Past Medical History:   Diagnosis Date    Acid reflux     Anxiety     Arthritis of back 2000    Cardiomyopathy     Colon cancer     S/P RESECTION     Coronary syndrome, acute     Diabetes     DX 2013, NO FSBS    Diabetes mellitus type I     GERD (gastroesophageal reflux disease)     Gout     History of colon cancer     History of heart disease     Hyperlipidemia     Hypertension     Hypertensive disorder     Long term current use of insulin     Mixed hyperlipidemia     Obesity     body mass index 30+-obesity    On home O2     at bedtime    TAMAR (obstructive sleep apnea)      NO CPAP USE    Osteoarthritis     Oxygen dependent     O2 2L NC EVERY NIGHT    Type 2 diabetes mellitus, uncontrolled     Urinary frequency     Wears glasses       Past Surgical History:   Procedure Laterality Date    APPENDECTOMY      BACK SURGERY      3 times 05/13/08 states x2    CARDIAC CATHETERIZATION N/A 02/13/2017    Procedure: Left Heart Cath;  Surgeon: Alhaji Robertson MD;  Location:  [facility];  Service:     CARDIAC DEFIBRILLATOR PLACEMENT      CARDIAC ELECTROPHYSIOLOGY PROCEDURE N/A 06/27/2017    Procedure: VVI ICD Implant vs S-ICD BSC;  Surgeon: David Garcia MD;  Location:  [facility];  Service:     CARPAL TUNNEL RELEASE Left     CHOLECYSTECTOMY      COLON RESECTION      COLONOSCOPY      TONSILLECTOMY      TYMPANOTOMY        Family History   Problem Relation Age of Onset    Hypertension Mother     No Known Problems Father     Hypertension Brother     Hypertension Brother     Diabetes Maternal Aunt      Social History     Socioeconomic History    Marital status:    Tobacco Use    Smoking status: Never     Passive exposure: Never    Smokeless tobacco: Never   Vaping Use    Vaping Use: Never used   Substance and Sexual Activity    Alcohol use: Never    Drug use: Never    Sexual activity: Yes     Partners: Female     Birth control/protection: Post-menopausal, Vasectomy      Current Outpatient Medications on File Prior to Visit   Medication Sig Dispense Refill    allopurinol (ZYLOPRIM) 100 MG tablet Take 1 tablet by mouth Daily.      aspirin 81 MG chewable tablet Chew 1 tablet Daily.      busPIRone (BUSPAR) 10 MG tablet Take 1 tablet by mouth 2 (Two) Times a Day.      carvedilol (COREG) 12.5 MG tablet Take 1 tablet by mouth Daily. 90 tablet 3    cetirizine (zyrTEC) 10 MG tablet Take 1 tablet by mouth Daily.      citalopram (CeleXA) 20 MG tablet Take 1 tablet by mouth Daily.      Diclofenac Sodium (VOLTAREN) 1 % gel gel As Needed.      Easy Touch Test  test strip 1 each by Other route Daily. ICD-10 E11.65 100 each 3    empagliflozin (Jardiance) 10 MG tablet tablet Take 1 tablet by mouth Daily. 90 tablet 3    Insulin Glargine, 2 Unit Dial, (Toujeo Max SoloStar) 300 UNIT/ML solution pen-injector injection INJECT 70 UNITS SUBCUTANEOUSLY ONCE DAILY AS DIRECTED 21 mL 3    Insulin Pen Needle (BD Pen Needle Cristy U/F) 32G X 4 MM misc 1 each Daily. 100 each 3    linagliptin (Tradjenta) 5 MG tablet tablet Take 1 tablet by mouth Daily. 90 tablet 3    metFORMIN ER (GLUCOPHAGE-XR) 500 MG 24 hr tablet Take 2 tablets by mouth 2 (Two) Times a Day. 360 tablet 3    montelukast (SINGULAIR) 10 MG tablet Take 1 tablet by mouth Every Night.      pantoprazole (PROTONIX) 40 MG EC tablet Take 1 tablet by mouth Daily.      rosuvastatin (Crestor) 10 MG tablet Take 1 tablet by mouth Daily. 90 tablet 3    sacubitril-valsartan (Entresto) 24-26 MG tablet TAKE ONE TABLET BY MOUTH TWICE DAILY 180 tablet 2    spironolactone (ALDACTONE) 50 MG tablet Take 1 tablet by mouth Daily. 90 tablet 3    tamsulosin (FLOMAX) 0.4 MG capsule 24 hr capsule Take 1 capsule by mouth Every Night.      torsemide (DEMADEX) 5 MG tablet Take 1 tablet by mouth Daily. 90 tablet 3    traZODone (DESYREL) 50 MG tablet Take 1 tablet by mouth every night at bedtime.      TRUEplus Lancets 30G misc USE TO CHECK BLOOD GLUCOSE LEVEL TWICE DAILY      Ventolin  (90 Base) MCG/ACT inhaler As Needed.      [DISCONTINUED] citalopram (CeleXA) 10 MG tablet Take 2 tablets by mouth Daily.       No current facility-administered medications on file prior to visit.      Allergies   Allergen Reactions    Other GI Intolerance     All pain medications cause constipation        Review of Systems   Constitutional:  Negative for activity change, appetite change, chills, diaphoresis, fatigue, fever and unexpected weight change.   HENT:  Negative for congestion, dental problem, drooling, ear discharge, ear pain, facial swelling, hearing loss,  "mouth sores, nosebleeds, postnasal drip, rhinorrhea, sinus pressure, sneezing, sore throat, tinnitus, trouble swallowing and voice change.    Eyes:  Negative for photophobia, pain, discharge, redness, itching and visual disturbance.   Respiratory:  Negative for apnea, cough, choking, chest tightness, shortness of breath, wheezing and stridor.    Cardiovascular:  Negative for chest pain, palpitations and leg swelling.   Gastrointestinal:  Negative for abdominal distention, abdominal pain, anal bleeding, blood in stool, constipation, diarrhea, nausea, rectal pain and vomiting.   Endocrine: Negative for cold intolerance, heat intolerance, polydipsia, polyphagia and polyuria.   Genitourinary:  Negative for decreased urine volume, difficulty urinating, dysuria, enuresis, flank pain, frequency, genital sores, hematuria and urgency.   Musculoskeletal:  Positive for arthralgias. Negative for back pain, gait problem, joint swelling, myalgias, neck pain and neck stiffness.   Skin:  Negative for color change, pallor, rash and wound.   Allergic/Immunologic: Negative for environmental allergies, food allergies and immunocompromised state.   Neurological:  Negative for dizziness, tremors, seizures, syncope, facial asymmetry, speech difficulty, weakness, light-headedness, numbness and headaches.   Hematological:  Negative for adenopathy. Does not bruise/bleed easily.   Psychiatric/Behavioral:  Negative for agitation, behavioral problems, confusion, decreased concentration, dysphoric mood, hallucinations, self-injury, sleep disturbance and suicidal ideas. The patient is not nervous/anxious and is not hyperactive.         Objective      Physical Exam  /68   Ht 175.3 cm (69.02\")   Wt 112 kg (246 lb 3.2 oz)   BMI 36.34 kg/m²     Body mass index is 36.34 kg/m².    General:   Mental Status:  Alert   Appearance: Cooperative, in no acute distress   Build and Nutrition: Overweight by BMI male   Orientation: Alert and oriented to " person, place and time   Posture: Normal   Gait: Nonantalgic    Integument:              Right shoulder: No skin lesions, no rash, no ecchymosis     Upper Extremities:              Right Shoulder:                          Tenderness:    None                          Swelling:          None                          Range of motion:        External rotation:         20°                                                              Forward flexion:          160°                                                              Abduction:                   160°  Deformities:     None  Functional testing: Positive drop arm, negative lift-off, weak external rotation    Imaging/Studies  Imaging Results (Last 24 Hours)       ** No results found for the last 24 hours. **          No new imaging today.    Assessment and Plan     Diagnoses and all orders for this visit:    1. Rotator cuff arthropathy, right (Primary)        1. Rotator cuff arthropathy, right        I reviewed my findings with the patient.  Still having pain in the shoulder, although good relief with the last intra-articular shoulder injection.  I would like for him to see Dr. Ramirez for an evaluation, and we will make a referral to see him in the near future.    Return for Referral to Dr. Ramirez.      Vazquez Montez MD  01/03/24  15:44 EST

## 2024-01-05 ENCOUNTER — OFFICE VISIT (OUTPATIENT)
Dept: ORTHOPEDIC SURGERY | Facility: CLINIC | Age: 64
End: 2024-01-05
Payer: MEDICARE

## 2024-01-05 VITALS
DIASTOLIC BLOOD PRESSURE: 70 MMHG | WEIGHT: 246.91 LBS | SYSTOLIC BLOOD PRESSURE: 118 MMHG | HEIGHT: 69 IN | BODY MASS INDEX: 36.57 KG/M2

## 2024-01-05 DIAGNOSIS — S46.001A INJURY OF RIGHT ROTATOR CUFF, INITIAL ENCOUNTER: ICD-10-CM

## 2024-01-05 DIAGNOSIS — M75.51 BURSITIS OF RIGHT SHOULDER: ICD-10-CM

## 2024-01-05 DIAGNOSIS — M75.121 NONTRAUMATIC COMPLETE TEAR OF RIGHT ROTATOR CUFF: Primary | ICD-10-CM

## 2024-01-05 DIAGNOSIS — M75.21 BICEPS TENDINITIS OF RIGHT UPPER EXTREMITY: ICD-10-CM

## 2024-01-05 DIAGNOSIS — M75.41 IMPINGEMENT SYNDROME OF RIGHT SHOULDER: ICD-10-CM

## 2024-01-05 NOTE — PROGRESS NOTES
[facility] [place] [facility] Office Visit - Mk Ramirez MD    Office Visit       Patient Name: Abdirahman Mclean    Chief Complaint:   Chief Complaint   Patient presents with    Right Shoulder - Pain     Rotator cuff arthropathy, right       Referring Physician: Dr. Montez  - I appreciate the referral      History of Present Illness:   Abdirahman Mclean is a 63 y.o. male who presents with right body part: shoulder Reason: pain.  Onset:Onset: mechanical fall (tripped over his dog). The issue has been ongoing for 4 month(s) (DOI: September 2023). Pain is a 4/10 on the pain scale. Pain is described as Pain Characterization: throbbing. Associated symptoms include Symptoms: pain, popping, grinding, giving way/buckling, and numbness/tingling . The pain is worse with lying on affected side and any movement of the joint; resting and elevating the extremity improve the pain. Previous treatments have included: physical therapy and steroid injection (last injection 9/27/23). I have reviewed the patient's history of present illness as noted/entered above.    I have reviewed the patient's past medical history, surgical history, social history, family history, medications, and allergies as noted in the electronic medical record and as noted/entered.  I have reviewed the patient's review of systems as noted/enter and updated as noted in the patient's HPI.      RIGHT SHOULDER    BALDEMAR: Sept 2023, tripped over his dog and fell but pain didn't really start until about a month later  I suspect an acute on chronic type picture  Injection 9/27/2023 lasted about 2 months    Insulin Dependent Diabetes Mellitus  Non-ischemic cardiomyopathy  ICD in place  Paroxsymal afib  Dr. Garcia  Heart failure  Completed physical therapy, helped some but injection helped more      63 y.o. male  Body mass index is 36.45 kg/m².    Subjective   Subjective      Review of Systems    Constitutional: Negative.  Negative for chills, fatigue and fever.   HENT: Negative.  Negative for congestion and dental problem.    Eyes: Negative.  Negative for blurred vision.   Respiratory: Negative.  Negative for shortness of breath.    Cardiovascular: Negative.  Negative for leg swelling.   Gastrointestinal: Negative.  Negative for abdominal pain.   Endocrine: Negative.  Negative for polyuria.   Genitourinary: Negative.  Negative for difficulty urinating.   Musculoskeletal:  Positive for arthralgias.   Skin: Negative.    Allergic/Immunologic: Negative.    Neurological: Negative.    Hematological: Negative.  Negative for adenopathy.   Psychiatric/Behavioral: Negative.  Negative for behavioral problems.         Past Medical History:   Past Medical History:   Diagnosis Date    Acid reflux     Anxiety     Arthritis of back 2000    Cardiomyopathy     Colon cancer     S/P RESECTION     Coronary syndrome, acute     Diabetes     DX 2013, NO FSBS    Diabetes mellitus type I     GERD (gastroesophageal reflux disease)     Gout     History of colon cancer     History of heart disease     Hyperlipidemia     Hypertension     Hypertensive disorder     Long term current use of insulin     Mixed hyperlipidemia     Obesity     body mass index 30+-obesity    On home O2     at bedtime    TAMAR (obstructive sleep apnea)     NO CPAP USE    Osteoarthritis     Oxygen dependent     O2 2L NC EVERY NIGHT    Type 2 diabetes mellitus, uncontrolled     Urinary frequency     Wears glasses        Past Surgical History:   Past Surgical History:   Procedure Laterality Date    APPENDECTOMY      BACK SURGERY      3 times 05/13/08 states x2    CARDIAC CATHETERIZATION N/A 02/13/2017    Procedure: Left Heart Cath;  Surgeon: Alhaji Robertson MD;  Location: [facility] INVASIVE LOCATION;  Service:     CARDIAC DEFIBRILLATOR PLACEMENT      CARDIAC ELECTROPHYSIOLOGY PROCEDURE N/A 06/27/2017    Procedure: VVI ICD Implant vs S-ICD BSC;  Surgeon:  David Garcia MD;  Location: [facility] INVASIVE LOCATION;  Service:     CARPAL TUNNEL RELEASE Left     CHOLECYSTECTOMY      COLON RESECTION      COLONOSCOPY      TONSILLECTOMY      TYMPANOTOMY         Family History:   Family History   Problem Relation Age of Onset    Hypertension Mother     No Known Problems Father     Hypertension Brother     Hypertension Brother     Diabetes Maternal Aunt        Social History:   Social History     Socioeconomic History    Marital status:    Tobacco Use    Smoking status: Never     Passive exposure: Never    Smokeless tobacco: Never   Vaping Use    Vaping Use: Never used   Substance and Sexual Activity    Alcohol use: Never    Drug use: Never    Sexual activity: Yes     Partners: Female     Birth control/protection: Post-menopausal, Vasectomy       Medications:   Current Outpatient Medications:     allopurinol (ZYLOPRIM) 100 MG tablet, Take 1 tablet by mouth Daily., Disp: , Rfl:     aspirin 81 MG chewable tablet, Chew 1 tablet Daily., Disp: , Rfl:     busPIRone (BUSPAR) 10 MG tablet, Take 1 tablet by mouth 2 (Two) Times a Day., Disp: , Rfl:     carvedilol (COREG) 12.5 MG tablet, Take 1 tablet by mouth Daily., Disp: 90 tablet, Rfl: 3    cetirizine (zyrTEC) 10 MG tablet, Take 1 tablet by mouth Daily., Disp: , Rfl:     citalopram (CeleXA) 20 MG tablet, Take 1 tablet by mouth Daily., Disp: , Rfl:     Diclofenac Sodium (VOLTAREN) 1 % gel gel, As Needed., Disp: , Rfl:     Easy Touch Test test strip, 1 each by Other route Daily. ICD-10 E11.65, Disp: 100 each, Rfl: 3    empagliflozin (Jardiance) 10 MG tablet tablet, Take 1 tablet by mouth Daily., Disp: 90 tablet, Rfl: 3    Insulin Glargine, 2 Unit Dial, (Toujeo Max SoloStar) 300 UNIT/ML solution pen-injector injection, INJECT 70 UNITS SUBCUTANEOUSLY ONCE DAILY AS DIRECTED, Disp: 21 mL, Rfl: 3    Insulin Pen Needle (BD Pen Needle Cristy U/F) 32G X 4 MM misc, 1 each Daily., Disp: 100 each, Rfl: 3    linagliptin (Tradjenta) 5 MG  "tablet tablet, Take 1 tablet by mouth Daily., Disp: 90 tablet, Rfl: 3    metFORMIN ER (GLUCOPHAGE-XR) 500 MG 24 hr tablet, Take 2 tablets by mouth 2 (Two) Times a Day., Disp: 360 tablet, Rfl: 3    montelukast (SINGULAIR) 10 MG tablet, Take 1 tablet by mouth Every Night., Disp: , Rfl:     pantoprazole (PROTONIX) 40 MG EC tablet, Take 1 tablet by mouth Daily., Disp: , Rfl:     rosuvastatin (Crestor) 10 MG tablet, Take 1 tablet by mouth Daily., Disp: 90 tablet, Rfl: 3    sacubitril-valsartan (Entresto) 24-26 MG tablet, TAKE ONE TABLET BY MOUTH TWICE DAILY, Disp: 180 tablet, Rfl: 2    spironolactone (ALDACTONE) 50 MG tablet, Take 1 tablet by mouth Daily., Disp: 90 tablet, Rfl: 3    tamsulosin (FLOMAX) 0.4 MG capsule 24 hr capsule, Take 1 capsule by mouth Every Night., Disp: , Rfl:     torsemide (DEMADEX) 5 MG tablet, Take 1 tablet by mouth Daily., Disp: 90 tablet, Rfl: 3    traZODone (DESYREL) 50 MG tablet, Take 1 tablet by mouth every night at bedtime., Disp: , Rfl:     TRUEplus Lancets 30G misc, USE TO CHECK BLOOD GLUCOSE LEVEL TWICE DAILY, Disp: , Rfl:     Ventolin  (90 Base) MCG/ACT inhaler, As Needed., Disp: , Rfl:     Allergies:   Allergies   Allergen Reactions    Other GI Intolerance     All pain medications cause constipation       The following portions of the patient's history were reviewed and updated as appropriate: allergies, current medications, past family history, past medical history, past social history, past surgical history and problem list.        Objective    Objective      Vital Signs:   Vitals:    01/05/24 0912   BP: 118/70   Weight: 112 kg (246 lb 14.6 oz)   Height: 175.3 cm (69.02\")       Ortho Exam:  RIGHT SHOULDER  General: no acute distress, comfortable  Vitals reviewed in chart    Musculoskeletal Exam    SIDE: RIGHT SHOULDER  Shoulder Exam:    Tenderness: rotator cuff    Range of motion measurements (degrees)  Forward flexion/Abduction/External rotation at side/ER at 90/IR at 90/IR " position  Active: Dysrhythmic motion but is able to go above 90 degrees with some effort and increased pain, 110/110/40/60/70  Passive: same, pain limited    Painful arc of motion: yes  No evidence of septic joint  Pain with forward flexion and abduction greater: 60 degrees  Impingement testing Neer's test - positive/painful  Impingement testing Hawkin's test - positive/painful    Rotator Cuff Testing:  Tenderness to palpation at rotator cuff - YES  Rotator cuff testing Mendel's test - positive  Rotator cuff testing External rotation -positive, external rotation weakness  Rotator cuff testing Lag signs - no, negative Hornblower's  Rotator cuff testing Belly press - no  Pain with abduction great than 90 degrees - yes    Scapular dyskinesis - present, abnormal scapular motion    Long head of the biceps testing:  Trevizo's test for biceps & Speed's test -painful  Bicipital groove tenderness to palpation/tenderness to palpation of biceps tendon -painful      Results Review:   Imaging Results (Last 24 Hours)       ** No results found for the last 24 hours. **          9/27/2023 Right shoulder xrays in the clinic/system reviewed  He does have subtle narrowing of the acromiohumeral index on the reviewed images.  He has an ICD in place on the right side as well.    Procedures             Assessment / Plan      Assessment/Plan:   Problem List Items Addressed This Visit          Musculoskeletal and Injuries    Nontraumatic complete tear of right rotator cuff - Primary    Relevant Orders    FL Contrast Injection CT / MRI    CT Arthrogram Shoulder (Unilateral Any Joint)    Injury of right rotator cuff    Relevant Orders    FL Contrast Injection CT / MRI    CT Arthrogram Shoulder (Unilateral Any Joint)    Impingement syndrome of right shoulder    Relevant Orders    FL Contrast Injection CT / MRI    CT Arthrogram Shoulder (Unilateral Any Joint)    Bursitis of right shoulder    Relevant Orders    FL Contrast Injection CT / MRI    CT  Arthrogram Shoulder (Unilateral Any Joint)    Biceps tendinitis of right upper extremity    Relevant Orders    FL Contrast Injection CT / MRI    CT Arthrogram Shoulder (Unilateral Any Joint)       RIGHT SHOULDER  CT arthrogram of the shoulder is recommended.  Indication: suspected rotator cuff tear  The CT arthrogram is critical to evaluate for rotator cuff tearing and will help with possible surgical planning.  ICD in place prevents MRI and claustrophobic      Follow Up: AFTER RIGHT SHOULDER CT ARTHROGRAM        Mk Ramirez MD, FAAOS  Orthopedic Surgeon  Fellowship Trained Shoulder and Elbow Surgeon  [facility]  Orthopedics and Sports Medicine  [address] [place] [address], [address]  [place] [address]    01/05/24  09:56 EST     Alert and oriented to person, place and time

## 2024-01-05 NOTE — LETTER
January 5, 2024     Vazquez Montez MD  [address] [id number]    Patient: Abdirahman Mclean   YOB: 1960   Date of Visit: 1/5/2024       Dear Vazquez Montez MD,    Thank you for referring Abdirahman Mclean to me for evaluation. Below is a copy of my consult note.    If you have questions, please do not hesitate to call me. I look forward to following Abdirahman along with you.         Sincerely,        Mk Ramirez MD        CC: No Recipients                                                                        [facility] [place] [facility] Office Visit - Mk Ramirez MD    Office Visit       Patient Name: Abdirahman Mclean    Chief Complaint:   Chief Complaint   Patient presents with   • Right Shoulder - Pain     Rotator cuff arthropathy, right       Referring Physician: Dr. Montez  - I appreciate the referral      History of Present Illness:   Abdirahman Mclean is a 63 y.o. male who presents with right body part: shoulder Reason: pain.  Onset:Onset: mechanical fall (tripped over his dog). The issue has been ongoing for 4 month(s) (DOI: September 2023). Pain is a 4/10 on the pain scale. Pain is described as Pain Characterization: throbbing. Associated symptoms include Symptoms: pain, popping, grinding, giving way/buckling, and numbness/tingling . The pain is worse with lying on affected side and any movement of the joint; resting and elevating the extremity improve the pain. Previous treatments have included: physical therapy and steroid injection (last injection 9/27/23). I have reviewed the patient's history of present illness as noted/entered above.    I have reviewed the patient's past medical history, surgical history, social history, family history, medications, and allergies as noted in the electronic medical record and as noted/entered.  I have reviewed the patient's review of systems as noted/enter and updated as noted in the patient's HPI.      RIGHT  SHOULDER    BALDEMAR: Sept 2023, tripped over his dog and fell but pain didn't really start until about a month later  I suspect an acute on chronic type picture  Injection 9/27/2023 lasted about 2 months    Insulin Dependent Diabetes Mellitus  Non-ischemic cardiomyopathy  ICD in place  Paroxsymal afib  Dr. Garcia  Heart failure  Completed physical therapy, helped some but injection helped more      63 y.o. male  Body mass index is 36.45 kg/m².    Subjective   Subjective     Review of Systems   Constitutional: Negative.  Negative for chills, fatigue and fever.   HENT: Negative.  Negative for congestion and dental problem.    Eyes: Negative.  Negative for blurred vision.   Respiratory: Negative.  Negative for shortness of breath.    Cardiovascular: Negative.  Negative for leg swelling.   Gastrointestinal: Negative.  Negative for abdominal pain.   Endocrine: Negative.  Negative for polyuria.   Genitourinary: Negative.  Negative for difficulty urinating.   Musculoskeletal:  Positive for arthralgias.   Skin: Negative.    Allergic/Immunologic: Negative.    Neurological: Negative.    Hematological: Negative.  Negative for adenopathy.   Psychiatric/Behavioral: Negative.  Negative for behavioral problems.         Past Medical History:   Past Medical History:   Diagnosis Date   • Acid reflux    • Anxiety    • Arthritis of back 2000   • Cardiomyopathy    • Colon cancer     S/P RESECTION    • Coronary syndrome, acute    • Diabetes     DX 2013, NO FSBS   • Diabetes mellitus type I    • GERD (gastroesophageal reflux disease)    • Gout    • History of colon cancer    • History of heart disease    • Hyperlipidemia    • Hypertension    • Hypertensive disorder    • Long term current use of insulin    • Mixed hyperlipidemia    • Obesity     body mass index 30+-obesity   • On home O2     at bedtime   • TAMAR (obstructive sleep apnea)     NO CPAP USE   • Osteoarthritis    • Oxygen dependent     O2 2L NC EVERY NIGHT   • Type 2 diabetes  mellitus, uncontrolled    • Urinary frequency    • Wears glasses        Past Surgical History:   Past Surgical History:   Procedure Laterality Date   • APPENDECTOMY     • BACK SURGERY      3 times 05/13/08 states x2   • CARDIAC CATHETERIZATION N/A 02/13/2017    Procedure: Left Heart Cath;  Surgeon: Alhaji Robertson MD;  Location:  [facility] CATH INVASIVE LOCATION;  Service:    • CARDIAC DEFIBRILLATOR PLACEMENT     • CARDIAC ELECTROPHYSIOLOGY PROCEDURE N/A 06/27/2017    Procedure: VVI ICD Implant vs S-ICD BSC;  Surgeon: David Garcia MD;  Location:  [facility] INVASIVE LOCATION;  Service:    • CARPAL TUNNEL RELEASE Left    • CHOLECYSTECTOMY     • COLON RESECTION     • COLONOSCOPY     • TONSILLECTOMY     • TYMPANOTOMY         Family History:   Family History   Problem Relation Age of Onset   • Hypertension Mother    • No Known Problems Father    • Hypertension Brother    • Hypertension Brother    • Diabetes Maternal Aunt        Social History:   Social History     Socioeconomic History   • Marital status:    Tobacco Use   • Smoking status: Never     Passive exposure: Never   • Smokeless tobacco: Never   Vaping Use   • Vaping Use: Never used   Substance and Sexual Activity   • Alcohol use: Never   • Drug use: Never   • Sexual activity: Yes     Partners: Female     Birth control/protection: Post-menopausal, Vasectomy       Medications:   Current Outpatient Medications:   •  allopurinol (ZYLOPRIM) 100 MG tablet, Take 1 tablet by mouth Daily., Disp: , Rfl:   •  aspirin 81 MG chewable tablet, Chew 1 tablet Daily., Disp: , Rfl:   •  busPIRone (BUSPAR) 10 MG tablet, Take 1 tablet by mouth 2 (Two) Times a Day., Disp: , Rfl:   •  carvedilol (COREG) 12.5 MG tablet, Take 1 tablet by mouth Daily., Disp: 90 tablet, Rfl: 3  •  cetirizine (zyrTEC) 10 MG tablet, Take 1 tablet by mouth Daily., Disp: , Rfl:   •  citalopram (CeleXA) 20 MG tablet, Take 1 tablet by mouth Daily., Disp: , Rfl:   •  Diclofenac Sodium (VOLTAREN)  1 % gel gel, As Needed., Disp: , Rfl:   •  Easy Touch Test test strip, 1 each by Other route Daily. ICD-10 E11.65, Disp: 100 each, Rfl: 3  •  empagliflozin (Jardiance) 10 MG tablet tablet, Take 1 tablet by mouth Daily., Disp: 90 tablet, Rfl: 3  •  Insulin Glargine, 2 Unit Dial, (Toujeo Max SoloStar) 300 UNIT/ML solution pen-injector injection, INJECT 70 UNITS SUBCUTANEOUSLY ONCE DAILY AS DIRECTED, Disp: 21 mL, Rfl: 3  •  Insulin Pen Needle (BD Pen Needle Cristy U/F) 32G X 4 MM misc, 1 each Daily., Disp: 100 each, Rfl: 3  •  linagliptin (Tradjenta) 5 MG tablet tablet, Take 1 tablet by mouth Daily., Disp: 90 tablet, Rfl: 3  •  metFORMIN ER (GLUCOPHAGE-XR) 500 MG 24 hr tablet, Take 2 tablets by mouth 2 (Two) Times a Day., Disp: 360 tablet, Rfl: 3  •  montelukast (SINGULAIR) 10 MG tablet, Take 1 tablet by mouth Every Night., Disp: , Rfl:   •  pantoprazole (PROTONIX) 40 MG EC tablet, Take 1 tablet by mouth Daily., Disp: , Rfl:   •  rosuvastatin (Crestor) 10 MG tablet, Take 1 tablet by mouth Daily., Disp: 90 tablet, Rfl: 3  •  sacubitril-valsartan (Entresto) 24-26 MG tablet, TAKE ONE TABLET BY MOUTH TWICE DAILY, Disp: 180 tablet, Rfl: 2  •  spironolactone (ALDACTONE) 50 MG tablet, Take 1 tablet by mouth Daily., Disp: 90 tablet, Rfl: 3  •  tamsulosin (FLOMAX) 0.4 MG capsule 24 hr capsule, Take 1 capsule by mouth Every Night., Disp: , Rfl:   •  torsemide (DEMADEX) 5 MG tablet, Take 1 tablet by mouth Daily., Disp: 90 tablet, Rfl: 3  •  traZODone (DESYREL) 50 MG tablet, Take 1 tablet by mouth every night at bedtime., Disp: , Rfl:   •  TRUEplus Lancets 30G misc, USE TO CHECK BLOOD GLUCOSE LEVEL TWICE DAILY, Disp: , Rfl:   •  Ventolin  (90 Base) MCG/ACT inhaler, As Needed., Disp: , Rfl:     Allergies:   Allergies   Allergen Reactions   • Other GI Intolerance     All pain medications cause constipation       The following portions of the patient's history were reviewed and updated as appropriate: allergies, current  "medications, past family history, past medical history, past social history, past surgical history and problem list.        Objective    Objective     Vital Signs:   Vitals:    01/05/24 0912   BP: 118/70   Weight: 112 kg (246 lb 14.6 oz)   Height: 175.3 cm (69.02\")       Ortho Exam:  RIGHT SHOULDER  General: no acute distress, comfortable  Vitals reviewed in chart    Musculoskeletal Exam    SIDE: RIGHT SHOULDER  Shoulder Exam:    Tenderness: rotator cuff    Range of motion measurements (degrees)  Forward flexion/Abduction/External rotation at side/ER at 90/IR at 90/IR position  Active: Dysrhythmic motion but is able to go above 90 degrees with some effort and increased pain, 110/110/40/60/70  Passive: same, pain limited    Painful arc of motion: yes  No evidence of septic joint  Pain with forward flexion and abduction greater: 60 degrees  Impingement testing Neer's test - positive/painful  Impingement testing Hawkin's test - positive/painful    Rotator Cuff Testing:  Tenderness to palpation at rotator cuff - YES  Rotator cuff testing Mendel's test - positive  Rotator cuff testing External rotation -positive, external rotation weakness  Rotator cuff testing Lag signs - no, negative Hornblower's  Rotator cuff testing Belly press - no  Pain with abduction great than 90 degrees - yes    Scapular dyskinesis - present, abnormal scapular motion    Long head of the biceps testing:  Trevizo's test for biceps & Speed's test -painful  Bicipital groove tenderness to palpation/tenderness to palpation of biceps tendon -painful      Results Review:   Imaging Results (Last 24 Hours)       ** No results found for the last 24 hours. **          9/27/2023 Right shoulder xrays in the clinic/system reviewed  He does have subtle narrowing of the acromiohumeral index on the reviewed images.  He has an ICD in place on the right side as well.    Procedures             Assessment / Plan      Assessment/Plan:   Problem List Items Addressed This " Visit          Musculoskeletal and Injuries    Nontraumatic complete tear of right rotator cuff - Primary    Relevant Orders    FL Contrast Injection CT / MRI    CT Arthrogram Shoulder (Unilateral Any Joint)    Injury of right rotator cuff    Relevant Orders    FL Contrast Injection CT / MRI    CT Arthrogram Shoulder (Unilateral Any Joint)    Impingement syndrome of right shoulder    Relevant Orders    FL Contrast Injection CT / MRI    CT Arthrogram Shoulder (Unilateral Any Joint)    Bursitis of right shoulder    Relevant Orders    FL Contrast Injection CT / MRI    CT Arthrogram Shoulder (Unilateral Any Joint)    Biceps tendinitis of right upper extremity    Relevant Orders    FL Contrast Injection CT / MRI    CT Arthrogram Shoulder (Unilateral Any Joint)       RIGHT SHOULDER  CT arthrogram of the shoulder is recommended.  Indication: suspected rotator cuff tear  The CT arthrogram is critical to evaluate for rotator cuff tearing and will help with possible surgical planning.  ICD in place prevents MRI and claustrophobic      Follow Up: AFTER RIGHT SHOULDER CT ARTHROGRAM        Mk Ramirez MD, FAAOS  Orthopedic Surgeon  Fellowship Trained Shoulder and Elbow Surgeon  [facility]  Orthopedics and Sports Medicine  [address] [place] [address], [address]  [place] [address]    01/05/24  09:56 EST

## 2024-01-09 ENCOUNTER — TELEPHONE (OUTPATIENT)
Dept: ORTHOPEDIC SURGERY | Facility: CLINIC | Age: 64
End: 2024-01-09

## 2024-01-09 NOTE — TELEPHONE ENCOUNTER
DELETE AFTER REVIEWING: Telephone encounter to be sent to the clinical pool     Hub staff attempted to follow warm transfer process and was unsuccessful     Caller: Abdirahman Mclean    Relationship to patient: Self    Best call back number: [phone] (home)       Patient is needing: PATIENT WAS CALLING ABOUT SCHEDULING FOR [employer]. HE SAID THERE WAS A QUESTION ABOUT HIS INSURANCE. PATIENT STILL HAS [employer] MED[employer] REPLACEMENT/[employer]   WHICH IS OON FOR [facility]

## 2024-01-10 DIAGNOSIS — M75.41 IMPINGEMENT SYNDROME OF RIGHT SHOULDER: ICD-10-CM

## 2024-01-10 DIAGNOSIS — M75.121 NONTRAUMATIC COMPLETE TEAR OF RIGHT ROTATOR CUFF: Primary | ICD-10-CM

## 2024-01-10 DIAGNOSIS — M75.21 BICEPS TENDINITIS OF RIGHT UPPER EXTREMITY: ICD-10-CM

## 2024-01-10 DIAGNOSIS — M12.811 ROTATOR CUFF ARTHROPATHY, RIGHT: ICD-10-CM

## 2024-01-10 DIAGNOSIS — S46.001A INJURY OF RIGHT ROTATOR CUFF, INITIAL ENCOUNTER: ICD-10-CM

## 2024-01-11 ENCOUNTER — OFFICE VISIT (OUTPATIENT)
Dept: CARDIOLOGY | Facility: CLINIC | Age: 64
End: 2024-01-11
Payer: MEDICARE

## 2024-01-11 VITALS
SYSTOLIC BLOOD PRESSURE: 108 MMHG | OXYGEN SATURATION: 97 % | BODY MASS INDEX: 36.51 KG/M2 | HEART RATE: 76 BPM | WEIGHT: 246.5 LBS | DIASTOLIC BLOOD PRESSURE: 62 MMHG | HEIGHT: 69 IN

## 2024-01-11 DIAGNOSIS — I50.22 CHRONIC SYSTOLIC CONGESTIVE HEART FAILURE: Primary | ICD-10-CM

## 2024-01-11 DIAGNOSIS — E11.9 TYPE 2 DIABETES MELLITUS WITHOUT COMPLICATION, WITHOUT LONG-TERM CURRENT USE OF INSULIN: ICD-10-CM

## 2024-01-11 DIAGNOSIS — E78.2 MIXED HYPERLIPIDEMIA: ICD-10-CM

## 2024-01-11 DIAGNOSIS — I10 PRIMARY HYPERTENSION: ICD-10-CM

## 2024-01-11 RX ORDER — CARVEDILOL 12.5 MG/1
12.5 TABLET ORAL DAILY
Qty: 90 TABLET | Refills: 3 | Status: SHIPPED | OUTPATIENT
Start: 2024-01-11

## 2024-01-11 RX ORDER — SPIRONOLACTONE 50 MG/1
50 TABLET, FILM COATED ORAL DAILY
Qty: 90 TABLET | Refills: 3 | Status: SHIPPED | OUTPATIENT
Start: 2024-01-11

## 2024-01-11 RX ORDER — SACUBITRIL AND VALSARTAN 24; 26 MG/1; MG/1
1 TABLET, FILM COATED ORAL 2 TIMES DAILY
Qty: 180 TABLET | Refills: 2 | Status: SHIPPED | OUTPATIENT
Start: 2024-01-11

## 2024-01-11 RX ORDER — ROSUVASTATIN CALCIUM 10 MG/1
10 TABLET, COATED ORAL DAILY
Qty: 90 TABLET | Refills: 3 | Status: SHIPPED | OUTPATIENT
Start: 2024-01-11

## 2024-01-11 NOTE — PROGRESS NOTES
[facility] Cardiology  Office Progress Note  Abdirahman Mclean  1960  [address] [place] [address]       Visit Date: 01/11/24    PCP: Dallas Crespo MD  [address] [place] [facility] [address] [address]    IDENTIFICATION: A 63 y.o. 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    PROBLEM LIST:    Nonischemic Cardiomyopathy  2008 [facility] wnl EF 60  2/17 [facility] nl cors EF 20%  6/27/17 SJM ICD implant  ER 2/2019 NSR/ST, PAC  Echo 2/2019 LVEF 35-40%  Echo 3/4/2022: 35%  DM -A1c11- 8.3 2018              Onset 2011   1.  7/21 A1c 6.5   2.  11/21 A1c 6.7  HTN  HL-on statin  12/20 117/233/34/46  TAMAR- cpap intolerant  Palpitations/PAF:  12/16 holter <1% pvc, <1% pac   Noted short episodes on device interrogation   7. Chronically elevated cpk 4-500        CC:   Chief Complaint   Patient presents with    Chronic systolic congestive heart failure       Allergies  Allergies   Allergen Reactions    Other GI Intolerance     All pain medications cause constipation       Current Medications    Current Outpatient Medications:     allopurinol (ZYLOPRIM) 100 MG tablet, Take 1 tablet by mouth Daily., Disp: , Rfl:     aspirin 81 MG chewable tablet, Chew 1 tablet Daily., Disp: , Rfl:     busPIRone (BUSPAR) 10 MG tablet, Take 1 tablet by mouth 2 (Two) Times a Day., Disp: , Rfl:     carvedilol (COREG) 12.5 MG tablet, Take 1 tablet by mouth Daily., Disp: 90 tablet, Rfl: 3    cetirizine (zyrTEC) 10 MG tablet, Take 1 tablet by mouth Daily., Disp: , Rfl:     citalopram (CeleXA) 20 MG tablet, Take 1 tablet by mouth Daily., Disp: , Rfl:     Diclofenac Sodium (VOLTAREN) 1 % gel gel, As Needed., Disp: , Rfl:     Easy Touch Test test strip, 1 each by Other route Daily. ICD-10 E11.65, Disp: 100 each, Rfl: 3    empagliflozin (Jardiance) 10 MG tablet tablet, Take 1 tablet by mouth Daily., Disp: 90 tablet, Rfl: 3    Insulin Glargine, 2 Unit Dial, (Toujeo Max SoloStar) 300 UNIT/ML solution  "pen-injector injection, INJECT 70 UNITS SUBCUTANEOUSLY ONCE DAILY AS DIRECTED, Disp: 21 mL, Rfl: 3    Insulin Pen Needle (BD Pen Needle Cristy U/F) 32G X 4 MM misc, 1 each Daily., Disp: 100 each, Rfl: 3    linagliptin (Tradjenta) 5 MG tablet tablet, Take 1 tablet by mouth Daily., Disp: 90 tablet, Rfl: 3    metFORMIN ER (GLUCOPHAGE-XR) 500 MG 24 hr tablet, Take 2 tablets by mouth 2 (Two) Times a Day., Disp: 360 tablet, Rfl: 3    montelukast (SINGULAIR) 10 MG tablet, Take 1 tablet by mouth Every Night., Disp: , Rfl:     pantoprazole (PROTONIX) 40 MG EC tablet, Take 1 tablet by mouth Daily., Disp: , Rfl:     rosuvastatin (Crestor) 10 MG tablet, Take 1 tablet by mouth Daily., Disp: 90 tablet, Rfl: 3    sacubitril-valsartan (Entresto) 24-26 MG tablet, Take 1 tablet by mouth 2 (Two) Times a Day., Disp: 180 tablet, Rfl: 2    spironolactone (ALDACTONE) 50 MG tablet, Take 1 tablet by mouth Daily., Disp: 90 tablet, Rfl: 3    traZODone (DESYREL) 50 MG tablet, Take 1 tablet by mouth every night at bedtime., Disp: , Rfl:     TRUEplus Lancets 30G misc, USE TO CHECK BLOOD GLUCOSE LEVEL TWICE DAILY, Disp: , Rfl:     Ventolin  (90 Base) MCG/ACT inhaler, As Needed., Disp: , Rfl:       History of Present Illness   Abdirahman Mclean is a 63 y.o. year old male here for follow up.    No symptoms of new.  He is largely sedentary.  He does walk 2 times weekly around [place]      OBJECTIVE:  Vitals:    01/11/24 1527   BP: 108/62   BP Location: Right arm   Patient Position: Sitting   Pulse: 76   SpO2: 97%   Weight: 112 kg (246 lb 8 oz)   Height: 175.3 cm (69\")     Body mass index is 36.4 kg/m².    Constitutional:       Appearance: Healthy appearance. Not in distress.   Neck:      Vascular: No JVR. JVD normal.   Pulmonary:      Effort: Pulmonary effort is normal.      Breath sounds: Normal breath sounds. No wheezing. No rhonchi. No rales.   Chest:      Chest wall: Not tender to palpatation.   Cardiovascular:      PMI " at left midclavicular line. ICD CDI Normal rate. Regular rhythm. Normal S1. Normal S2.       Murmurs: There is no murmur.      No gallop.  No click. No rub.   Pulses:     Intact distal pulses.   Edema:     Peripheral edema absent.   Abdominal:      General: Bowel sounds are normal.      Palpations: Abdomen is soft.      Tenderness: There is no abdominal tenderness.      Comments: obese   Musculoskeletal: Normal range of motion.         General: No tenderness. Skin:     General: Skin is warm and dry.   Neurological:      General: No focal deficit present.      Mental Status: Alert and oriented to person, place and time.         Diagnostic Data:  Procedures      ASSESSMENT:   Diagnosis Plan   1. Chronic systolic congestive heart failure        2. Primary hypertension        3. Mixed hyperlipidemia        4. Type 2 diabetes mellitus without complication, without long-term current use of insulin              PLAN:  CHF nonischemic on guideline directed medication.        Hypertension controlled tamsulosin Entresto carvedilol    Mixed dyslipidemia controlled on statin therapy    Diabetes with fasting blood sugars less than 130 he states at home.  Reiterated need to be compliant with lower carbohydrate intake           Danny Licona MD, FACC

## 2024-01-31 RX ORDER — LINAGLIPTIN 5 MG/1
5 TABLET, FILM COATED ORAL DAILY
Qty: 90 TABLET | Refills: 3 | Status: SHIPPED | OUTPATIENT
Start: 2024-01-31

## 2024-01-31 NOTE — TELEPHONE ENCOUNTER
Rx Refill Note  Requested Prescriptions     Pending Prescriptions Disp Refills    Tradjenta 5 MG tablet tablet [Pharmacy Med Name: Tradjenta 5 mg tablet] 90 tablet 3     Sig: TAKE ONE TABLET BY MOUTH EVERY DAY          Last office visit with prescribing clinician: 10/31/2023     Next office visit with prescribing clinician: 3/19/2024         Elva Ledesma MA  01/31/24, 12:16 EST

## 2024-02-12 ENCOUNTER — TELEPHONE (OUTPATIENT)
Dept: ENDOCRINOLOGY | Facility: CLINIC | Age: 64
End: 2024-02-12
Payer: MEDICARE

## 2024-02-12 RX ORDER — ACYCLOVIR 400 MG/1
1 TABLET ORAL
Qty: 3 EACH | Refills: 11 | Status: SHIPPED | OUTPATIENT
Start: 2024-02-12

## 2024-02-19 ENCOUNTER — HOSPITAL ENCOUNTER (EMERGENCY)
Age: 64
Discharge: HOME | End: 2024-02-19
Payer: MEDICARE

## 2024-02-19 VITALS — HEART RATE: 70 BPM | DIASTOLIC BLOOD PRESSURE: 88 MMHG | OXYGEN SATURATION: 97 % | SYSTOLIC BLOOD PRESSURE: 112 MMHG

## 2024-02-19 VITALS
TEMPERATURE: 98 F | RESPIRATION RATE: 18 BRPM | SYSTOLIC BLOOD PRESSURE: 120 MMHG | HEART RATE: 70 BPM | OXYGEN SATURATION: 98 % | DIASTOLIC BLOOD PRESSURE: 78 MMHG

## 2024-02-19 VITALS — DIASTOLIC BLOOD PRESSURE: 70 MMHG | OXYGEN SATURATION: 98 % | HEART RATE: 68 BPM | SYSTOLIC BLOOD PRESSURE: 105 MMHG

## 2024-02-19 VITALS
RESPIRATION RATE: 16 BRPM | OXYGEN SATURATION: 95 % | DIASTOLIC BLOOD PRESSURE: 86 MMHG | HEART RATE: 73 BPM | SYSTOLIC BLOOD PRESSURE: 133 MMHG | TEMPERATURE: 97.88 F

## 2024-02-19 VITALS — DIASTOLIC BLOOD PRESSURE: 72 MMHG | HEART RATE: 72 BPM | OXYGEN SATURATION: 94 % | SYSTOLIC BLOOD PRESSURE: 113 MMHG

## 2024-02-19 VITALS — HEART RATE: 74 BPM | SYSTOLIC BLOOD PRESSURE: 107 MMHG | OXYGEN SATURATION: 92 % | DIASTOLIC BLOOD PRESSURE: 62 MMHG

## 2024-02-19 VITALS — SYSTOLIC BLOOD PRESSURE: 108 MMHG | DIASTOLIC BLOOD PRESSURE: 78 MMHG | HEART RATE: 69 BPM | OXYGEN SATURATION: 96 %

## 2024-02-19 VITALS — BODY MASS INDEX: 36.9 KG/M2

## 2024-02-19 DIAGNOSIS — E11.9: ICD-10-CM

## 2024-02-19 DIAGNOSIS — I44.4: ICD-10-CM

## 2024-02-19 DIAGNOSIS — R07.9: Primary | ICD-10-CM

## 2024-02-19 LAB
ALBUMIN LEVEL: 4.6 G/DL (ref 3.5–5)
ALBUMIN/GLOB SERPL: 1.4 {RATIO} (ref 1.1–1.8)
ALP ISO SERPL-ACNC: 100 U/L (ref 38–126)
ALT SERPLBLD-CCNC: 30 U/L (ref 12–78)
ANION GAP SERPL CALC-SCNC: 14.4 MEQ/L (ref 5–15)
AST SERPL QL: 30 U/L (ref 17–59)
BILIRUBIN,TOTAL: 0.7 MG/DL (ref 0.2–1.3)
BUN SERPL-MCNC: 15 MG/DL (ref 9–20)
CALCIUM SPEC-MCNC: 9 MG/DL (ref 8.4–10.2)
CHLAMYDOPHILA PNEUMONIAE, PCR: (no result)
CHLORIDE SPEC-SCNC: 103 MMOL/L (ref 98–107)
CO2 SERPL-SCNC: 28 MMOL/L (ref 22–30)
CREATININE CLEARANCE ESTIMATED: 118 ML/MIN (ref 50–200)
CREATININE,SERUM: 0.9 MG/DL (ref 0.66–1.25)
D-DIMER: 0.6 UG/ML (ref 0–0.5)
ESTIMATED GLOMERULAR FILT RATE: 85 ML/MIN (ref 60–?)
GFR (AFRICAN AMERICAN): 102 ML/MIN (ref 60–?)
GLOBULIN SER CALC-MCNC: 3.3 G/DL (ref 1.3–3.2)
GLUCOSE: 110 MG/DL (ref 74–100)
HCT VFR BLD CALC: 47.1 % (ref 42–52)
HGB BLD-MCNC: 15.7 G/DL (ref 14.1–18)
INR PPP: 0.98 (ref 0.9–1.1)
MCHC RBC-ENTMCNC: 33.3 G/DL (ref 31.8–35.4)
MCV RBC: 89.6 FL (ref 80–94)
MEAN CORPUSCULAR HEMOGLOBIN: 29.9 PG (ref 27–31.2)
MYCOPLASMA PNEUMONIAE, PCR: (no result)
NT PRO BRAIN NATRIURETIC PEP.: 37.3 PG/ML (ref 0–125)
PLATELET # BLD: 239 K/MM3 (ref 142–424)
POTASSIUM: 4.4 MMOL/L (ref 3.5–5.1)
PROT SERPL-MCNC: 7.9 G/DL (ref 6.3–8.2)
PT BLD: 10.6 SECONDS (ref 10.1–12.5)
RBC # BLD AUTO: 5.26 M/MM3 (ref 4.6–6.2)
SODIUM SPEC-SCNC: 141 MMOL/L (ref 136–145)
TROPONIN I: < 0.01 NG/ML (ref 0–0.03)
WBC # BLD AUTO: 10 K/MM3 (ref 4.8–10.8)

## 2024-02-19 PROCEDURE — 85610 PROTHROMBIN TIME: CPT

## 2024-02-19 PROCEDURE — 93005 ELECTROCARDIOGRAM TRACING: CPT

## 2024-02-19 PROCEDURE — 99285 EMERGENCY DEPT VISIT HI MDM: CPT

## 2024-02-19 PROCEDURE — 87635 SARS-COV-2 COVID-19 AMP PRB: CPT

## 2024-02-19 PROCEDURE — 84484 ASSAY OF TROPONIN QUANT: CPT

## 2024-02-19 PROCEDURE — 83880 ASSAY OF NATRIURETIC PEPTIDE: CPT

## 2024-02-19 PROCEDURE — 87632 RESP VIRUS 6-11 TARGETS: CPT

## 2024-02-19 PROCEDURE — 71045 X-RAY EXAM CHEST 1 VIEW: CPT

## 2024-02-19 PROCEDURE — 85025 COMPLETE CBC W/AUTO DIFF WBC: CPT

## 2024-02-19 PROCEDURE — 80053 COMPREHEN METABOLIC PANEL: CPT

## 2024-02-19 PROCEDURE — 85378 FIBRIN DEGRADE SEMIQUANT: CPT

## 2024-02-19 PROCEDURE — 36415 COLL VENOUS BLD VENIPUNCTURE: CPT

## 2024-02-20 RX ORDER — LANCETS 33 GAUGE
EACH MISCELLANEOUS
Qty: 100 EACH | Refills: 3 | Status: SHIPPED | OUTPATIENT
Start: 2024-02-20

## 2024-02-20 NOTE — TELEPHONE ENCOUNTER
"Rx Refill Note  Requested Prescriptions     Pending Prescriptions Disp Refills    Comfort EZ Pen Needles 32G X 4 MM misc [Pharmacy Med Name: Comfort EZ Pen Needles 32 gauge x 5/32\"] 100 each 3     Sig: USE AS DIRECTED EVERY DAY      Last office visit with prescribing clinician: 10/31/2023     Next office visit with prescribing clinician: 3/19/2024             "

## 2024-03-04 ENCOUNTER — HOSPITAL ENCOUNTER (OUTPATIENT)
Dept: HOSPITAL 22 - RT | Age: 64
End: 2024-03-04
Payer: MEDICARE

## 2024-03-04 DIAGNOSIS — I50.9: Primary | ICD-10-CM

## 2024-03-04 DIAGNOSIS — R07.9: ICD-10-CM

## 2024-03-04 PROCEDURE — 93306 TTE W/DOPPLER COMPLETE: CPT

## 2024-03-04 RX ADMIN — PERFLUTREN 2 MG: 6.52 INJECTION, SUSPENSION INTRAVENOUS at 11:36

## 2024-05-30 ENCOUNTER — HOSPITAL ENCOUNTER (OUTPATIENT)
Dept: HOSPITAL 22 - LAB | Age: 64
Discharge: HOME | End: 2024-05-30
Payer: MEDICARE

## 2024-05-30 DIAGNOSIS — I50.20: Primary | ICD-10-CM

## 2024-05-30 LAB
ANION GAP SERPL CALC-SCNC: 15.5 MEQ/L (ref 5–15)
BUN SERPL-MCNC: 14 MG/DL (ref 9–20)
CALCIUM SPEC-MCNC: 9.2 MG/DL (ref 8.4–10.2)
CHLORIDE SPEC-SCNC: 101 MMOL/L (ref 98–107)
CO2 SERPL-SCNC: 29 MMOL/L (ref 22–30)
CREATININE,SERUM: 0.9 MG/DL (ref 0.66–1.25)
ESTIMATED GLOMERULAR FILT RATE: 84 ML/MIN (ref 60–?)
GFR (AFRICAN AMERICAN): 102 ML/MIN (ref 60–?)
GLUCOSE: 101 MG/DL (ref 74–100)
POTASSIUM: 4.5 MMOL/L (ref 3.5–5.1)
SODIUM SPEC-SCNC: 141 MMOL/L (ref 136–145)

## 2024-05-30 PROCEDURE — 80048 BASIC METABOLIC PNL TOTAL CA: CPT

## 2024-05-30 PROCEDURE — 36415 COLL VENOUS BLD VENIPUNCTURE: CPT

## 2024-06-06 ENCOUNTER — HOSPITAL ENCOUNTER (OUTPATIENT)
Dept: HOSPITAL 22 - LAB | Age: 64
Discharge: HOME | End: 2024-06-06
Payer: MEDICARE

## 2024-06-06 DIAGNOSIS — I50.42: Primary | ICD-10-CM

## 2024-06-06 DIAGNOSIS — C18.9: ICD-10-CM

## 2024-06-06 DIAGNOSIS — Z79.84: ICD-10-CM

## 2024-06-06 DIAGNOSIS — E11.9: ICD-10-CM

## 2024-06-06 DIAGNOSIS — M10.9: ICD-10-CM

## 2024-06-06 LAB
ALBUMIN LEVEL: 4.4 G/DL (ref 3.5–5)
ALBUMIN/GLOB SERPL: 1.6 {RATIO} (ref 1.1–1.8)
ALP ISO SERPL-ACNC: 84 U/L (ref 38–126)
ALT SERPLBLD-CCNC: 31 U/L (ref 12–78)
ANION GAP SERPL CALC-SCNC: 14.6 MEQ/L (ref 5–15)
AST SERPL QL: 32 U/L (ref 17–59)
BILIRUBIN,TOTAL: 0.7 MG/DL (ref 0.2–1.3)
BUN SERPL-MCNC: 12 MG/DL (ref 9–20)
CALCIUM SPEC-MCNC: 9 MG/DL (ref 8.4–10.2)
CHLORIDE SPEC-SCNC: 101 MMOL/L (ref 98–107)
CHOLEST SPEC-SCNC: 126 MG/DL (ref 140–200)
CO2 SERPL-SCNC: 28 MMOL/L (ref 22–30)
CREAT BLD-SCNC: 0.8 MG/DL (ref 0.66–1.25)
ESTIMATED GLOMERULAR FILT RATE: 97 ML/MIN (ref 60–?)
GFR (AFRICAN AMERICAN): 117 ML/MIN (ref 60–?)
GLOBULIN SER CALC-MCNC: 2.8 G/DL (ref 1.3–3.2)
GLUCOSE: 109 MG/DL (ref 74–100)
HBA1C MFR BLD: 6.3 % (ref 4–6)
HCT VFR BLD CALC: 47.4 % (ref 42–52)
HDLC SERPL-MCNC: 37 MG/DL (ref 40–60)
HGB BLD-MCNC: 14.9 G/DL (ref 14.1–18)
MCHC RBC-ENTMCNC: 31.5 G/DL (ref 31.8–35.4)
MCV RBC: 90 FL (ref 80–94)
MEAN CORPUSCULAR HEMOGLOBIN: 28.3 PG (ref 27–31.2)
PLATELET # BLD: 207 K/MM3 (ref 142–424)
POTASSIUM: 4.6 MMOL/L (ref 3.5–5.1)
PROT SERPL-MCNC: 7.2 G/DL (ref 6.3–8.2)
RBC # BLD AUTO: 5.27 M/MM3 (ref 4.6–6.2)
SODIUM SPEC-SCNC: 139 MMOL/L (ref 136–145)
TRIGLYCERIDES: 239 MG/DL (ref 30–150)
URATE SERPL-SCNC: 4.2 MG/DL (ref 3.5–8.5)
WBC # BLD AUTO: 7.3 K/MM3 (ref 4.8–10.8)

## 2024-06-06 PROCEDURE — 80061 LIPID PANEL: CPT

## 2024-06-06 PROCEDURE — 84550 ASSAY OF BLOOD/URIC ACID: CPT

## 2024-06-06 PROCEDURE — 80053 COMPREHEN METABOLIC PANEL: CPT

## 2024-06-06 PROCEDURE — 85025 COMPLETE CBC W/AUTO DIFF WBC: CPT

## 2024-06-06 PROCEDURE — 83036 HEMOGLOBIN GLYCOSYLATED A1C: CPT

## 2024-06-06 PROCEDURE — 36415 COLL VENOUS BLD VENIPUNCTURE: CPT

## 2024-07-10 ENCOUNTER — HOSPITAL ENCOUNTER (OUTPATIENT)
Dept: HOSPITAL 22 - LAB | Age: 64
Discharge: HOME | End: 2024-07-10
Payer: MEDICARE

## 2024-07-10 DIAGNOSIS — I50.20: Primary | ICD-10-CM

## 2024-07-10 LAB
ANION GAP SERPL CALC-SCNC: 12.2 MEQ/L (ref 5–15)
BUN SERPL-MCNC: 10 MG/DL (ref 9–20)
CALCIUM SPEC-MCNC: 8.9 MG/DL (ref 8.4–10.2)
CHLORIDE SPEC-SCNC: 103 MMOL/L (ref 98–107)
CO2 SERPL-SCNC: 28 MMOL/L (ref 22–30)
CREAT BLD-SCNC: 0.8 MG/DL (ref 0.66–1.25)
ESTIMATED GLOMERULAR FILT RATE: 97 ML/MIN (ref 60–?)
GFR (AFRICAN AMERICAN): 117 ML/MIN (ref 60–?)
GLUCOSE: 93 MG/DL (ref 74–100)
POTASSIUM: 4.2 MMOL/L (ref 3.5–5.1)
SODIUM SPEC-SCNC: 139 MMOL/L (ref 136–145)

## 2024-07-10 PROCEDURE — 80048 BASIC METABOLIC PNL TOTAL CA: CPT

## 2024-07-10 PROCEDURE — 36415 COLL VENOUS BLD VENIPUNCTURE: CPT

## 2024-09-11 ENCOUNTER — HOSPITAL ENCOUNTER (OUTPATIENT)
Dept: HOSPITAL 22 - PT | Age: 64
Discharge: HOME | End: 2024-09-11
Payer: MEDICARE

## 2024-09-11 DIAGNOSIS — I50.22: Primary | ICD-10-CM

## 2024-09-11 PROCEDURE — 93798 PHYS/QHP OP CAR RHAB W/ECG: CPT

## 2024-10-04 ENCOUNTER — HOSPITAL ENCOUNTER (EMERGENCY)
Age: 64
Discharge: HOME | End: 2024-10-04
Payer: MEDICARE

## 2024-10-04 VITALS
SYSTOLIC BLOOD PRESSURE: 91 MMHG | DIASTOLIC BLOOD PRESSURE: 66 MMHG | RESPIRATION RATE: 12 BRPM | OXYGEN SATURATION: 93 % | HEART RATE: 69 BPM

## 2024-10-04 VITALS
RESPIRATION RATE: 20 BRPM | OXYGEN SATURATION: 97 % | HEART RATE: 74 BPM | DIASTOLIC BLOOD PRESSURE: 70 MMHG | SYSTOLIC BLOOD PRESSURE: 97 MMHG | TEMPERATURE: 98.06 F

## 2024-10-04 VITALS
DIASTOLIC BLOOD PRESSURE: 70 MMHG | RESPIRATION RATE: 19 BRPM | HEART RATE: 69 BPM | OXYGEN SATURATION: 95 % | SYSTOLIC BLOOD PRESSURE: 97 MMHG

## 2024-10-04 VITALS — HEART RATE: 68 BPM | OXYGEN SATURATION: 93 % | SYSTOLIC BLOOD PRESSURE: 72 MMHG | DIASTOLIC BLOOD PRESSURE: 49 MMHG

## 2024-10-04 VITALS
RESPIRATION RATE: 21 BRPM | OXYGEN SATURATION: 92 % | SYSTOLIC BLOOD PRESSURE: 95 MMHG | DIASTOLIC BLOOD PRESSURE: 56 MMHG | HEART RATE: 75 BPM

## 2024-10-04 VITALS — HEART RATE: 69 BPM | DIASTOLIC BLOOD PRESSURE: 52 MMHG | OXYGEN SATURATION: 93 % | SYSTOLIC BLOOD PRESSURE: 86 MMHG

## 2024-10-04 VITALS — HEART RATE: 75 BPM | SYSTOLIC BLOOD PRESSURE: 91 MMHG | OXYGEN SATURATION: 94 % | DIASTOLIC BLOOD PRESSURE: 60 MMHG

## 2024-10-04 VITALS
RESPIRATION RATE: 21 BRPM | HEART RATE: 67 BPM | TEMPERATURE: 97.6 F | SYSTOLIC BLOOD PRESSURE: 128 MMHG | OXYGEN SATURATION: 97 % | DIASTOLIC BLOOD PRESSURE: 98 MMHG

## 2024-10-04 VITALS — DIASTOLIC BLOOD PRESSURE: 50 MMHG | OXYGEN SATURATION: 93 % | HEART RATE: 67 BPM | SYSTOLIC BLOOD PRESSURE: 75 MMHG

## 2024-10-04 VITALS — BODY MASS INDEX: 36.9 KG/M2

## 2024-10-04 DIAGNOSIS — R07.9: Primary | ICD-10-CM

## 2024-10-04 LAB
ALBUMIN LEVEL: 4.7 G/DL (ref 3.5–5)
ALBUMIN/GLOB SERPL: 1.4 {RATIO} (ref 1.1–1.8)
ALP ISO SERPL-ACNC: 67 U/L (ref 38–126)
ALT SERPLBLD-CCNC: 34 U/L (ref 12–78)
ANION GAP SERPL CALC-SCNC: 14.7 MEQ/L (ref 5–15)
AST SERPL QL: 37 U/L (ref 17–59)
BILIRUBIN,TOTAL: 1.1 MG/DL (ref 0.2–1.3)
BUN SERPL-MCNC: 12 MG/DL (ref 9–20)
CALCIUM SPEC-MCNC: 9 MG/DL (ref 8.4–10.2)
CHLORIDE SPEC-SCNC: 101 MMOL/L (ref 98–107)
CO2 SERPL-SCNC: 28 MMOL/L (ref 22–30)
CREAT BLD-SCNC: 0.7 MG/DL (ref 0.66–1.25)
CREATININE CLEARANCE ESTIMATED: 117 ML/MIN (ref 50–200)
D-DIMER: < 0.25 UG/ML (ref 0–0.5)
ESTIMATED GLOMERULAR FILT RATE: 113 ML/MIN (ref 60–?)
GFR (AFRICAN AMERICAN): 137 ML/MIN (ref 60–?)
GLOBULIN SER CALC-MCNC: 3.4 G/DL (ref 1.3–3.2)
GLUCOSE: 86 MG/DL (ref 74–100)
HCO3 BLDV-SCNC: 26 MMOL/L (ref 23–30)
HCT VFR BLD CALC: 50.2 % (ref 42–52)
HGB BLD-MCNC: 15.5 G/DL (ref 14.1–18)
HIV 1 + 2 AB PNL SERPLBLD IA.RAPID: NONREACTIVE
LACTATE VENOUS: 2 MMOL/L (ref 0.4–2)
MAGNESIUM: 2 MG/DL (ref 1.6–2.3)
MCHC RBC-ENTMCNC: 30.9 G/DL (ref 31.8–35.4)
MCV RBC: 93.9 FL (ref 80–94)
MEAN CORPUSCULAR HEMOGLOBIN: 29 PG (ref 27–31.2)
NT PRO BRAIN NATRIURETIC PEP.: < 20 PG/ML (ref 0–125)
PCO2 BLDV: 47.4 MMOL/L (ref 35–51)
PH BLDV: 7.36 MMOL/L (ref 7.31–7.41)
PLATELET # BLD: 235 K/MM3 (ref 142–424)
PO2 BLDV: 45.3 MMOL/L (ref 28–40)
POTASSIUM: 4.7 MMOL/L (ref 3.5–5.1)
PROT SERPL-MCNC: 8.1 G/DL (ref 6.3–8.2)
RBC # BLD AUTO: 5.35 M/MM3 (ref 4.6–6.2)
SODIUM SPEC-SCNC: 139 MMOL/L (ref 136–145)
TROPONIN I: < 0.01 NG/ML (ref 0–0.03)
TROPONIN I: < 0.01 NG/ML (ref 0–0.03)
WBC # BLD AUTO: 8.9 K/MM3 (ref 4.8–10.8)

## 2024-10-04 PROCEDURE — 84484 ASSAY OF TROPONIN QUANT: CPT

## 2024-10-04 PROCEDURE — 85378 FIBRIN DEGRADE SEMIQUANT: CPT

## 2024-10-04 PROCEDURE — 93005 ELECTROCARDIOGRAM TRACING: CPT

## 2024-10-04 PROCEDURE — 85025 COMPLETE CBC W/AUTO DIFF WBC: CPT

## 2024-10-04 PROCEDURE — 83880 ASSAY OF NATRIURETIC PEPTIDE: CPT

## 2024-10-04 PROCEDURE — 71045 X-RAY EXAM CHEST 1 VIEW: CPT

## 2024-10-04 PROCEDURE — 83735 ASSAY OF MAGNESIUM: CPT

## 2024-10-04 PROCEDURE — 86803 HEPATITIS C AB TEST: CPT

## 2024-10-04 PROCEDURE — 87389 HIV-1 AG W/HIV-1&-2 AB AG IA: CPT

## 2024-10-04 PROCEDURE — 82803 BLOOD GASES ANY COMBINATION: CPT

## 2024-10-04 PROCEDURE — 80053 COMPREHEN METABOLIC PANEL: CPT

## 2024-10-04 PROCEDURE — 99284 EMERGENCY DEPT VISIT MOD MDM: CPT

## 2024-11-12 RX ORDER — INSULIN GLARGINE 300 U/ML
INJECTION, SOLUTION SUBCUTANEOUS
Qty: 21 ML | Refills: 0 | Status: SHIPPED | OUTPATIENT
Start: 2024-11-12

## 2024-11-12 NOTE — TELEPHONE ENCOUNTER
Rx Refill Note  Requested Prescriptions     Pending Prescriptions Disp Refills    Insulin Glargine, 2 Unit Dial, (Toujeo Max SoloStar) 300 UNIT/ML solution pen-injector injection [Pharmacy Med Name: Toujeo Max U-300 SoloStar 300 unit/mL (3 mL) subcutaneous insulin pen] 21 mL 0     Sig: inject 70 UNITS SUBCUTANEOUSLY EVERY DAY AS DIRECTED      Pt needs to schedule an appointment for more Rx.    Last office visit with prescribing clinician: 10/31/2023     Next office visit with prescribing clinician: Visit date not found   }    Mauro Chi MA  11/12/24, 13:14 EST

## 2025-01-13 RX ORDER — ROSUVASTATIN CALCIUM 10 MG/1
10 TABLET, COATED ORAL DAILY
Qty: 30 TABLET | Refills: 0 | Status: SHIPPED | OUTPATIENT
Start: 2025-01-13

## 2025-01-17 ENCOUNTER — HOSPITAL ENCOUNTER (OUTPATIENT)
Dept: HOSPITAL 22 - LAB | Age: 65
Discharge: HOME | End: 2025-01-17
Payer: MEDICARE

## 2025-01-17 DIAGNOSIS — I50.42: ICD-10-CM

## 2025-01-17 DIAGNOSIS — E11.9: ICD-10-CM

## 2025-01-17 DIAGNOSIS — C18.9: Primary | ICD-10-CM

## 2025-01-17 DIAGNOSIS — M1A.09X0: ICD-10-CM

## 2025-01-17 LAB
ALBUMIN LEVEL: 4.3 G/DL (ref 3.5–5)
ALBUMIN/GLOB SERPL: 1.7 {RATIO} (ref 1.1–1.8)
ALP ISO SERPL-ACNC: 78 U/L (ref 38–126)
ALT SERPLBLD-CCNC: 30 U/L (ref 12–78)
ANION GAP SERPL CALC-SCNC: 12.7 MEQ/L (ref 5–15)
AST SERPL QL: 30 U/L (ref 17–59)
BILIRUBIN,TOTAL: 0.7 MG/DL (ref 0.2–1.3)
BUN SERPL-MCNC: 10 MG/DL (ref 9–20)
CALCIUM SPEC-MCNC: 8.6 MG/DL (ref 8.4–10.2)
CHLORIDE SPEC-SCNC: 104 MMOL/L (ref 98–107)
CHOLEST SPEC-SCNC: 98 MG/DL (ref 140–200)
CO2 SERPL-SCNC: 29 MMOL/L (ref 22–30)
CREAT BLD-SCNC: 0.8 MG/DL (ref 0.66–1.25)
ESTIMATED GLOMERULAR FILT RATE: 97 ML/MIN (ref 60–?)
GFR (AFRICAN AMERICAN): 117 ML/MIN (ref 60–?)
GLOBULIN SER CALC-MCNC: 2.5 G/DL (ref 1.3–3.2)
GLUCOSE: 72 MG/DL (ref 74–100)
HBA1C MFR BLD: 6 % (ref 4–6)
HCT VFR BLD CALC: 44.5 % (ref 42–52)
HDLC SERPL-MCNC: 33 MG/DL (ref 40–60)
HGB BLD-MCNC: 14.4 G/DL (ref 14.1–18)
MCHC RBC-ENTMCNC: 32.4 G/DL (ref 31.8–35.4)
MCV RBC: 87.1 FL (ref 80–94)
MEAN CORPUSCULAR HEMOGLOBIN: 28.2 PG (ref 27–31.2)
PLATELET # BLD: 241 K/MM3 (ref 142–424)
POTASSIUM: 3.7 MMOL/L (ref 3.5–5.1)
PROT SERPL-MCNC: 6.8 G/DL (ref 6.3–8.2)
RBC # BLD AUTO: 5.11 M/MM3 (ref 4.6–6.2)
SODIUM SPEC-SCNC: 142 MMOL/L (ref 136–145)
TRIGLYCERIDES: 188 MG/DL (ref 30–150)
URATE SERPL-SCNC: 4.3 MG/DL (ref 3.5–8.5)
WBC # BLD AUTO: 8.6 K/MM3 (ref 4.8–10.8)

## 2025-01-17 PROCEDURE — 83036 HEMOGLOBIN GLYCOSYLATED A1C: CPT

## 2025-01-17 PROCEDURE — 84550 ASSAY OF BLOOD/URIC ACID: CPT

## 2025-01-17 PROCEDURE — 85025 COMPLETE CBC W/AUTO DIFF WBC: CPT

## 2025-01-17 PROCEDURE — 80061 LIPID PANEL: CPT

## 2025-01-17 PROCEDURE — 80053 COMPREHEN METABOLIC PANEL: CPT

## 2025-01-17 PROCEDURE — 36415 COLL VENOUS BLD VENIPUNCTURE: CPT

## 2025-01-27 RX ORDER — LINAGLIPTIN 5 MG/1
5 TABLET, FILM COATED ORAL DAILY
Qty: 90 TABLET | Refills: 3 | OUTPATIENT
Start: 2025-01-27

## 2025-01-27 NOTE — TELEPHONE ENCOUNTER
Rx Refill Note  Requested Prescriptions     Pending Prescriptions Disp Refills    Tradjenta 5 MG tablet tablet [Pharmacy Med Name: Tradjenta 5 mg tablet] 90 tablet 3     Sig: TAKE ONE TABLET BY MOUTH EVERY DAY      Last office visit with prescribing clinician: 10/31/2023     Next office visit with prescribing clinician: Visit date not found     Ofelia Plaza MA  01/27/25, 13:23 EST

## (undated) DEVICE — ADULT, W/LG. BACK PAD, RADIOTRANSPARENT ELEMENT AND LEAD WIRE: Brand: DEFIBRILLATION ELECTRODES

## (undated) DEVICE — IRRIGATOR BULB ASEPTO 60CC STRL

## (undated) DEVICE — PENCL E/S HNDSWCH ROCKRBTN HOLSTR 10FT

## (undated) DEVICE — GLIDESHEATH BASIC HYDROPHILIC COATED INTRODUCER SHEATH: Brand: GLIDESHEATH

## (undated) DEVICE — LEX ELECTRO PHYSIOLOGY: Brand: MEDLINE INDUSTRIES, INC.

## (undated) DEVICE — CATH DIAG EXPO M/ PK 6FR FL4/FR4 PIG 3PK

## (undated) DEVICE — A2000 MULTI-USE SYRINGE KIT, P/N 701277-003KIT CONTENTS: 100ML CONTRAST RESERVOIR AND TUBING WITH CONTRAST SPIKE AND CLAMP: Brand: A2000 MULTI-USE SYRINGE KIT

## (undated) DEVICE — Device

## (undated) DEVICE — CAUTERY TIP POLISHER: Brand: DEVON

## (undated) DEVICE — MEDI-VAC YANKAUER SUCTION HANDLE W/BULBOUS TIP: Brand: CARDINAL HEALTH

## (undated) DEVICE — LIMB HOLDERS: Brand: DEROYAL

## (undated) DEVICE — TR BAND RADIAL ARTERY COMPRESSION DEVICE: Brand: TR BAND

## (undated) DEVICE — CATH DIAG EXPO .056 FL3.5 6F 100CM

## (undated) DEVICE — SOL NACL 0.9PCT 1000ML

## (undated) DEVICE — DECANT BG O JET

## (undated) DEVICE — ST EXT IV SMARTSITE 2VLV SP M LL 5ML IV1

## (undated) DEVICE — MODEL BT2000 P/N 700287-012KIT CONTENTS: MANIFOLD WITH SALINE AND CONTRAST PORTS, SALINE TUBING WITH SPIKE AND HAND SYRINGE, TRANSDUCER: Brand: BT2000 AUTOMATED MANIFOLD KIT

## (undated) DEVICE — DECANTER: Brand: UNBRANDED

## (undated) DEVICE — PK CATH CARD 10

## (undated) DEVICE — SET PRIMARY GRVTY 10DP MALE LL 104IN

## (undated) DEVICE — ST INF PRI SMRTSTE 20DRP 2VLV 24ML 117

## (undated) DEVICE — MODEL AT P54, P/N 700608-035KIT CONTENTS: HAND CONTROLLER, 3-WAY HIGH-PRESSURE STOPCOCK WITH ROTATING END AND PREMIUM HIGH-PRESSURE TUBING: Brand: ANGIOTOUCH® KIT

## (undated) DEVICE — DRSNG SURESITE123 4X4.8IN

## (undated) DEVICE — 3M™ STERI-STRIP™ REINFORCED ADHESIVE SKIN CLOSURES, R1547, 1/2 IN X 4 IN (12 MM X 100 MM), 6 STRIPS/ENVELOPE: Brand: 3M™ STERI-STRIP™

## (undated) DEVICE — INTRO TEAR AWAY/LVD W/SD PRT 8F 13CM

## (undated) DEVICE — CANN NASL CO2 DIVIDED A/

## (undated) DEVICE — CANNULA,ADULT,SOFT-TOUCH,7'TUBE,UC: Brand: PENDING

## (undated) DEVICE — TUBING, SUCTION, 1/4" X 10', STRAIGHT: Brand: MEDLINE